# Patient Record
Sex: FEMALE | Race: WHITE | NOT HISPANIC OR LATINO | Employment: OTHER | URBAN - METROPOLITAN AREA
[De-identification: names, ages, dates, MRNs, and addresses within clinical notes are randomized per-mention and may not be internally consistent; named-entity substitution may affect disease eponyms.]

---

## 2023-03-06 ENCOUNTER — PATIENT OUTREACH (OUTPATIENT)
Dept: CARE COORDINATION | Facility: CLINIC | Age: 78
End: 2023-03-06
Payer: MEDICARE

## 2023-03-06 ENCOUNTER — DOCUMENTATION (OUTPATIENT)
Dept: PRIMARY CARE | Facility: CLINIC | Age: 78
End: 2023-03-06
Payer: MEDICARE

## 2023-03-06 DIAGNOSIS — I48.91 ATRIAL FIBRILLATION, UNSPECIFIED TYPE (MULTI): ICD-10-CM

## 2023-03-06 RX ORDER — SACUBITRIL AND VALSARTAN 24; 26 MG/1; MG/1
1 TABLET, FILM COATED ORAL 2 TIMES DAILY
COMMUNITY
End: 2023-06-07 | Stop reason: ALTCHOICE

## 2023-03-06 RX ORDER — DIGOXIN 125 MCG
125 TABLET ORAL DAILY
COMMUNITY
End: 2023-04-10 | Stop reason: ALTCHOICE

## 2023-03-06 RX ORDER — AMIODARONE HYDROCHLORIDE 200 MG/1
200 TABLET ORAL DAILY
COMMUNITY
End: 2023-04-10 | Stop reason: SDUPTHER

## 2023-03-06 RX ORDER — CARVEDILOL 6.25 MG/1
3.12 TABLET ORAL
COMMUNITY
End: 2024-01-22 | Stop reason: SDUPTHER

## 2023-03-06 RX ORDER — SPIRONOLACTONE 25 MG/1
12.5 TABLET ORAL DAILY
COMMUNITY
End: 2023-04-10 | Stop reason: SDUPTHER

## 2023-03-06 NOTE — PROGRESS NOTES
Discharge Facility: [facility]  Discharge Diagnosis: AFIB  Admission Date: 2/26/23  Discharge Date: 3/3/23    Engagement  Call Start Time: 1314 (3/6/2023  1:21 PM)    Medications  Medications reviewed with patient/caregiver?: Yes (3/6/2023  1:21 PM)  Is the patient having any side effects they believe may be caused by any medication additions or changes?: No (3/6/2023  1:21 PM)  Does the patient have all medications ordered at discharge?: Yes (3/6/2023  1:21 PM)  Nursing Interventions: Nurse provided patient education (3/6/2023  1:21 PM)  Prescription Comments: Reviewed to continue medications as prescribed on discharge paperwork until meeting with cardiology. Take 5mg eliquis until further follow up as patient was taking 2.5mg prior to hospital stay. (3/6/2023  1:21 PM)  Is the patient taking all medications as directed (includes completed medication regime)?: Yes (3/6/2023  1:21 PM)    Appointments  Does the patient have a primary care provider?: Yes (3/6/2023  1:21 PM)  Nursing Interventions: Educated patient on importance of making appointment (3/6/2023  1:21 PM)  Has the patient kept scheduled appointments due by today?: Yes (3/6/2023  1:21 PM)  Nursing Interventions: Educated on importance of keeping appointment (3/6/2023  1:21 PM)  Follow Up Tasks: PCP needed (3/6/2023  1:21 PM)    Patient Teaching  Does the patient have access to their discharge instructions?: Yes (3/6/2023  1:21 PM)  Nursing Interventions: Reviewed instructions with patient (3/6/2023  1:21 PM)  What is the patient's perception of their health status since discharge?: Improving (3/6/2023  1:21 PM)  Is the patient/caregiver able to teach back the hierarchy of who to call/visit for symptoms/problems? PCP, Specialist, Home Health nurse, Urgent Care, ED, 911: Yes (3/6/2023  1:21 PM)    Wrap Up  Call End Time: 1323 (3/6/2023  1:21 PM)

## 2023-03-09 ENCOUNTER — TELEPHONE (OUTPATIENT)
Dept: PRIMARY CARE | Facility: CLINIC | Age: 78
End: 2023-03-09
Payer: MEDICARE

## 2023-03-09 NOTE — TELEPHONE ENCOUNTER
Pt was recently released from [facility] last Friday - heart problems - heart was 147 pt has some questions in regard to new medication that they prescribed - they had shocked her heart to get her heart rate down - changed medication - now heart rate has been reading lowest 38 and highest 60.  Please call back. [phone].

## 2023-03-17 ENCOUNTER — PATIENT OUTREACH (OUTPATIENT)
Dept: CARE COORDINATION | Facility: CLINIC | Age: 78
End: 2023-03-17
Payer: MEDICARE

## 2023-03-17 ENCOUNTER — DOCUMENTATION (OUTPATIENT)
Dept: PRIMARY CARE | Facility: CLINIC | Age: 78
End: 2023-03-17
Payer: MEDICARE

## 2023-03-17 NOTE — PROGRESS NOTES
Discharge Facility:[facility]  Discharge Diagnosis:Digoxin Toxicity   Admission Date:3/13/2023  Discharge Date:3/16/2023    PCP appt:Pending        Engagement  Call Start Time: 1349 (3/17/2023  2:01 PM)    Medications  Medications reviewed with patient/caregiver?: Yes (3/17/2023  2:01 PM)  Is the patient having any side effects they believe may be caused by any medication additions or changes?: No (3/17/2023  2:01 PM)  Does the patient have all medications ordered at discharge?: Yes (3/17/2023  2:01 PM)  Care Management Interventions: Nurse provided patient education (3/6/2023  1:21 PM)  Prescription Comments: Reviewed to continue medications as prescribed on discharge paperwork until meeting with cardiology. Take 5mg eliquis until further follow up as patient was taking 2.5mg prior to hospital stay. (3/6/2023  1:21 PM)  Is the patient taking all medications as directed (includes completed medication regime)?: Yes (3/17/2023  2:01 PM)    Appointments  Does the patient have a primary care provider?: Yes (3/17/2023  2:01 PM)  Care Management Interventions: Advised patient to make appointment (3/17/2023  2:01 PM)  Has the patient kept scheduled appointments due by today?: Yes (3/17/2023  2:01 PM)  Care Management Interventions: Educated on importance of keeping appointment (3/6/2023  1:21 PM)  Follow Up Tasks: PCP needed (3/6/2023  1:21 PM)    Patient Teaching  Does the patient have access to their discharge instructions?: Yes (3/17/2023  2:01 PM)  Care Management Interventions: Reviewed instructions with patient (3/6/2023  1:21 PM)  What is the patient's perception of their health status since discharge?: Improving (3/17/2023  2:01 PM)  Is the patient/caregiver able to teach back the hierarchy of who to call/visit for symptoms/problems? PCP, Specialist, Home Health nurse, Urgent Care, ED, 911: Yes (3/17/2023  2:01 PM)    Wrap Up  Call End Time: 1353 (3/17/2023  2:01 PM)

## 2023-04-05 ENCOUNTER — TELEPHONE (OUTPATIENT)
Dept: PRIMARY CARE | Facility: CLINIC | Age: 78
End: 2023-04-05
Payer: MEDICARE

## 2023-04-05 ENCOUNTER — PATIENT OUTREACH (OUTPATIENT)
Dept: PRIMARY CARE | Facility: CLINIC | Age: 78
End: 2023-04-05
Payer: MEDICARE

## 2023-04-06 NOTE — PROGRESS NOTES
Discharge Facility:[facility]  Discharge Diagnosis:l97.89 Cardiac Tamponade after operative procedure  Admission Date:3/30/2023  Discharge Date:4/4/2023    PCP appt:4/10/2023  Engagement  Call Start Time: 1016 (4/6/2023 10:24 AM)    Medications  Medications reviewed with patient/caregiver?: Not applicable (No changes in medications) (4/6/2023 10:24 AM)  Is the patient having any side effects they believe may be caused by any medication additions or changes?: No (4/6/2023 10:24 AM)  Does the patient have all medications ordered at discharge?: Not applicable (4/6/2023 10:24 AM)  Care Management Interventions: No intervention needed (4/6/2023 10:24 AM)  Is the patient taking all medications as directed (includes completed medication regime)?: Yes (4/6/2023 10:24 AM)    Appointments  Does the patient have a primary care provider?: Yes (4/6/2023 10:24 AM)  Care Management Interventions: Verified appointment date/time/provider (4/6/2023 10:24 AM)  Has the patient kept scheduled appointments due by today?: Yes (4/6/2023 10:24 AM)    Patient Teaching  Does the patient have access to their discharge instructions?: Yes (4/6/2023 10:24 AM)  What is the patient's perception of their health status since discharge?: Improving (4/6/2023 10:24 AM)  Is the patient/caregiver able to teach back the hierarchy of who to call/visit for symptoms/problems? PCP, Specialist, Home Health nurse, Urgent Care, ED, 911: Yes (4/6/2023 10:24 AM)

## 2023-04-08 PROBLEM — R20.0 NUMBNESS IN BOTH LEGS: Status: ACTIVE | Noted: 2023-04-08

## 2023-04-08 PROBLEM — R42 DIZZINESS: Status: ACTIVE | Noted: 2023-04-08

## 2023-04-08 PROBLEM — M79.604 PAIN IN BOTH LOWER EXTREMITIES: Status: ACTIVE | Noted: 2023-04-08

## 2023-04-08 PROBLEM — M79.2 NEUROPATHIC PAIN: Status: ACTIVE | Noted: 2023-04-08

## 2023-04-08 PROBLEM — I42.0 DILATED CARDIOMYOPATHY (MULTI): Status: ACTIVE | Noted: 2023-04-08

## 2023-04-08 PROBLEM — T46.0X5S: Status: ACTIVE | Noted: 2023-04-08

## 2023-04-08 PROBLEM — I10 BENIGN ESSENTIAL HYPERTENSION: Status: ACTIVE | Noted: 2023-04-08

## 2023-04-08 PROBLEM — I95.9 HYPOTENSION: Status: ACTIVE | Noted: 2023-04-08

## 2023-04-08 PROBLEM — R06.02 EXERTIONAL SHORTNESS OF BREATH: Status: ACTIVE | Noted: 2023-04-08

## 2023-04-08 PROBLEM — R00.1 BRADYCARDIA: Status: ACTIVE | Noted: 2023-04-08

## 2023-04-08 PROBLEM — R42 LIGHTHEADEDNESS: Status: ACTIVE | Noted: 2023-04-08

## 2023-04-08 PROBLEM — I51.7 CARDIOMEGALY: Status: ACTIVE | Noted: 2023-04-08

## 2023-04-08 PROBLEM — I48.0 PAROXYSMAL ATRIAL FIBRILLATION (MULTI): Status: ACTIVE | Noted: 2023-04-08

## 2023-04-08 PROBLEM — R00.1 JUNCTIONAL BRADYCARDIA: Status: ACTIVE | Noted: 2023-04-08

## 2023-04-08 PROBLEM — E87.5 HYPERKALEMIA: Status: ACTIVE | Noted: 2023-04-08

## 2023-04-08 PROBLEM — M81.0 OSTEOPOROSIS: Status: ACTIVE | Noted: 2023-04-08

## 2023-04-08 PROBLEM — I48.91 ATRIAL FIBRILLATION WITH RVR (MULTI): Status: ACTIVE | Noted: 2023-04-08

## 2023-04-08 PROBLEM — R00.2 PALPITATIONS: Status: ACTIVE | Noted: 2023-04-08

## 2023-04-08 PROBLEM — R09.02 HYPOXIA: Status: ACTIVE | Noted: 2023-04-08

## 2023-04-08 PROBLEM — I95.1 ORTHOSTATIC HYPOTENSION: Status: ACTIVE | Noted: 2023-04-08

## 2023-04-08 PROBLEM — G62.9 NEUROPATHY: Status: ACTIVE | Noted: 2023-04-08

## 2023-04-08 PROBLEM — E78.5 DYSLIPIDEMIA: Status: ACTIVE | Noted: 2023-04-08

## 2023-04-08 PROBLEM — G95.19: Status: ACTIVE | Noted: 2023-04-08

## 2023-04-08 PROBLEM — M79.605 PAIN IN BOTH LOWER EXTREMITIES: Status: ACTIVE | Noted: 2023-04-08

## 2023-04-08 PROBLEM — I67.1 ANEURYSM OF MIDDLE CEREBRAL ARTERY (HHS-HCC): Status: ACTIVE | Noted: 2023-04-08

## 2023-04-08 PROBLEM — M85.88 OSTEOPENIA OF SPINE: Status: ACTIVE | Noted: 2023-04-08

## 2023-04-08 PROBLEM — I50.22 CHRONIC SYSTOLIC CONGESTIVE HEART FAILURE (MULTI): Status: ACTIVE | Noted: 2023-04-08

## 2023-04-08 PROBLEM — I77.810 ASCENDING AORTA DILATION (CMS-HCC): Status: ACTIVE | Noted: 2023-04-08

## 2023-04-08 PROBLEM — I65.29 CAROTID STENOSIS: Status: ACTIVE | Noted: 2023-04-08

## 2023-04-08 PROBLEM — C34.90 LUNG CANCER (MULTI): Status: ACTIVE | Noted: 2023-04-08

## 2023-04-08 PROBLEM — I73.9 CLAUDICATION, INTERMITTENT (CMS-HCC): Status: ACTIVE | Noted: 2023-04-08

## 2023-04-08 PROBLEM — G45.9 TRANSIENT ISCHEMIC ATTACK: Status: ACTIVE | Noted: 2023-04-08

## 2023-04-08 PROBLEM — J43.9 COPD (CHRONIC OBSTRUCTIVE PULMONARY DISEASE) WITH EMPHYSEMA (MULTI): Status: ACTIVE | Noted: 2023-04-08

## 2023-04-08 PROBLEM — I73.9 PAD (PERIPHERAL ARTERY DISEASE) (CMS-HCC): Status: ACTIVE | Noted: 2023-04-08

## 2023-04-08 RX ORDER — VITAMIN E MIXED 400 UNIT
400 CAPSULE ORAL DAILY
COMMUNITY

## 2023-04-10 ENCOUNTER — OFFICE VISIT (OUTPATIENT)
Dept: PRIMARY CARE | Facility: CLINIC | Age: 78
End: 2023-04-10
Payer: MEDICARE

## 2023-04-10 VITALS
TEMPERATURE: 96.8 F | RESPIRATION RATE: 16 BRPM | SYSTOLIC BLOOD PRESSURE: 128 MMHG | DIASTOLIC BLOOD PRESSURE: 70 MMHG | WEIGHT: 122 LBS | BODY MASS INDEX: 20.94 KG/M2

## 2023-04-10 DIAGNOSIS — J43.9 PULMONARY EMPHYSEMA, UNSPECIFIED EMPHYSEMA TYPE (MULTI): ICD-10-CM

## 2023-04-10 DIAGNOSIS — I10 BENIGN ESSENTIAL HYPERTENSION: ICD-10-CM

## 2023-04-10 DIAGNOSIS — I48.91 ATRIAL FIBRILLATION WITH RVR (MULTI): ICD-10-CM

## 2023-04-10 DIAGNOSIS — I73.9 CLAUDICATION, INTERMITTENT (CMS-HCC): ICD-10-CM

## 2023-04-10 DIAGNOSIS — E78.5 DYSLIPIDEMIA: ICD-10-CM

## 2023-04-10 DIAGNOSIS — I48.0 PAROXYSMAL ATRIAL FIBRILLATION (MULTI): ICD-10-CM

## 2023-04-10 DIAGNOSIS — I73.9 PAD (PERIPHERAL ARTERY DISEASE) (CMS-HCC): ICD-10-CM

## 2023-04-10 DIAGNOSIS — C34.31 MALIGNANT NEOPLASM OF LOWER LOBE OF RIGHT LUNG (MULTI): ICD-10-CM

## 2023-04-10 DIAGNOSIS — I42.0 DILATED CARDIOMYOPATHY (MULTI): Primary | ICD-10-CM

## 2023-04-10 DIAGNOSIS — I50.22 CHRONIC SYSTOLIC CONGESTIVE HEART FAILURE (MULTI): ICD-10-CM

## 2023-04-10 DIAGNOSIS — G95.19: ICD-10-CM

## 2023-04-10 PROBLEM — R00.1 BRADYCARDIA: Status: RESOLVED | Noted: 2023-04-08 | Resolved: 2023-04-10

## 2023-04-10 PROCEDURE — 3074F SYST BP LT 130 MM HG: CPT | Performed by: INTERNAL MEDICINE

## 2023-04-10 PROCEDURE — 3078F DIAST BP <80 MM HG: CPT | Performed by: INTERNAL MEDICINE

## 2023-04-10 PROCEDURE — 1160F RVW MEDS BY RX/DR IN RCRD: CPT | Performed by: INTERNAL MEDICINE

## 2023-04-10 PROCEDURE — 99496 TRANSJ CARE MGMT HIGH F2F 7D: CPT | Performed by: INTERNAL MEDICINE

## 2023-04-10 PROCEDURE — 1036F TOBACCO NON-USER: CPT | Performed by: INTERNAL MEDICINE

## 2023-04-10 PROCEDURE — 1159F MED LIST DOCD IN RCRD: CPT | Performed by: INTERNAL MEDICINE

## 2023-04-10 RX ORDER — AMIODARONE HYDROCHLORIDE 200 MG/1
200 TABLET ORAL DAILY
Qty: 30 TABLET | Refills: 0 | Status: SHIPPED | OUTPATIENT
Start: 2023-04-10 | End: 2023-04-11 | Stop reason: SDUPTHER

## 2023-04-10 RX ORDER — SPIRONOLACTONE 25 MG/1
25 TABLET ORAL DAILY
Qty: 90 TABLET | Refills: 3 | Status: SHIPPED | OUTPATIENT
Start: 2023-04-10 | End: 2024-02-07

## 2023-04-10 NOTE — PROGRESS NOTES
Subjective   Patient ID: Edda Tee is a 78 y.o. female who presents for Transition of care and hospital follow up, admitted on 3/30/2023 and discharged on 4/4/2023 for acute respiratory failure, cardiac tamponade and follow up from multiple hospital visits       She has never felt so bad.  She has edema in her feet.  She is fatigued. She could barely walk into the clinic today   She got home Tuesday from the hospital and went shopping on Wednesday  This is the first time she missed [employer] on Easter   She has an appt with Dr Mcneill next week 4/2023 and Dr Coombs on 4/25/2023  She continues to have increased fatigue   If she is sitting she can fall to sleep easily   She is not eating a lot because she is not hungry     She saw Dr De Paz in podiatry     HEALTH:  PAP not need   Mammo 1/2008 , 2016, 5/19, 8/2020, 8/2021, not needed for now   BD 1/2008 T-0.9, 4/16 T-1.9, 5/19 T-2.1, 8/2021 T-2.5, 1/2023   COLON 6/2008 + adenoma, 5/2012 and declines and declines Cologuard   EKG 11/17 , 8/18, 6/19, 9/2020, 5/2021, 7/2021, 5/2022, 8/2022 with cardio, 2/2023 ED   ECHO 9/17 normal, 5/2021   Carotids 9/17 <50%   Urine 2017 , 1/19   Hep C 8/18 -  FLU 1/12/2023  TDAP around 2013   Prevnar 5/17  Pneumo 1/2011  Zostavax never and declines   Shingrix recommend and declines   SARS CVD vaccine declines   Ophth Cataract surgery on 12/6/18 and 12/21/18, she is not wearing glasses except for night driving. No glaucoma or MD. Last visit in 2019 and will make another appt        Review of Systems  All systems negative except those listed in the HPI      Objective   Visit Vitals  /70   Temp 36 °C (96.8 °F)   Resp 16    Body mass index is 20.94 kg/m².     Physical Exam  Vitals reviewed.   Constitutional:       Appearance: Normal appearance. She is normal weight.   HENT:      Head: Normocephalic.      Right Ear: Tympanic membrane, ear canal and external ear normal.      Left Ear: Tympanic membrane, ear canal and external ear  normal.      Nose: Nose normal.      Mouth/Throat:      Pharynx: Oropharynx is clear.   Eyes:      Conjunctiva/sclera: Conjunctivae normal.   Cardiovascular:      Pulses: Normal pulses.      Heart sounds: Normal heart sounds.      Comments: A- fib,    Abdominal:      General: Bowel sounds are normal.      Palpations: Abdomen is soft.   Musculoskeletal:         General: Normal range of motion.      Cervical back: Normal range of motion and neck supple.      Comments: Pain right scapular area    Skin:     General: Skin is warm.      Comments: Ecchymosis from IVs while in hospital   she has an ulceration lateral metatarsal and proximal 5 th right toes, no infection noted    Neurological:      General: No focal deficit present.      Mental Status: She is alert and oriented to person, place, and time.   Psychiatric:         Mood and Affect: Mood normal.         Behavior: Behavior normal.         Thought Content: Thought content normal.         Judgment: Judgment normal.         Engagement  Call Start Time: 1016 (4/6/2023 10:24 AM)    Medications  Medications reviewed with patient/caregiver?: Not applicable (No changes in medications) (4/6/2023 10:24 AM)  Is the patient having any side effects they believe may be caused by any medication additions or changes?: No (4/6/2023 10:24 AM)  Does the patient have all medications ordered at discharge?: Not applicable (4/6/2023 10:24 AM)  Care Management Interventions: No intervention needed (4/6/2023 10:24 AM)  Is the patient taking all medications as directed (includes completed medication regime)?: Yes (4/6/2023 10:24 AM)    Appointments  Does the patient have a primary care provider?: Yes (4/6/2023 10:24 AM)  Care Management Interventions: Verified appointment date/time/provider (4/6/2023 10:24 AM)  Has the patient kept scheduled appointments due by today?: Yes (4/6/2023 10:24 AM)    Patient Teaching  Does the patient have access to their discharge instructions?: Yes  (4/6/2023 10:24 AM)  What is the patient's perception of their health status since discharge?: Improving (4/6/2023 10:24 AM)  Is the patient/caregiver able to teach back the hierarchy of who to call/visit for symptoms/problems? 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 She does her own shopping   Recommend she get a cane or walking stick with ambulation     She has never felt so bad.  She has edema in her feet.  She is fatigued. She could barely walk into the clinic today   She got home Tuesday from the hospital and went shopping on Wednesday  This is the first time she missed [facility]   She has an appt with Dr Mcneill next week 4/2023 and Dr Coombs on 4/25/2023  She continues to have increased fatigue   If she is sitting she can fall to sleep easily   She is not eating a lot because she is not hungry     Admitted on 3/30/2023 and discharged on 4/4/2023 for acute respiratory failure, cardiac tamponade. S/p percutaneous pericardiocentesis on 3/30/2023 with Dr Perez   This is a 79 yo female  with pmhx of HFrEF, atrial fibrillation on Eliquis, COPD   on 2 L nasal cannula as needed.    She presents on 3/30 for elective outpatient ICD placement which was complicated by cardiac tamponade and acute hypoxic respiratory failure. Pt initially managed in ICU with mechanical ventilation and pericardial drain placement.   Pericardial drain has since been removed, repeat echo shows resolution of pericardial effusion. EP was consulted, they recommended to resume her home medications including Eliquis. She has made an uneventful recovery, surgical site looks well, and has remained hemodynamically stable    Admitted on 3/13/2023 and discharged on 3/16/2023 for symtomatic bradycardia    Presented to the emergency department for   evaluation of symptomatic bradycardia.  Patient was discharged on March 3 after   receiving treatment for atrial fibrillation with RVR requiring cardioversion,   discharged with prescriptions for carvedilol, amiodarone, digoxin.    EKG 4/2023 showed bradycardia with curved ST depressions consistent with digoxin effect without evidence of ischemia, elevated serum digoxin level, elevated but downtrending troponins, mild prerenal SMOOTH.   On admission, all antihypertensives and antiarrhythmics were initially discontinued. Digoxin levels were trended until <1.5. She will permanently discontinue this medication. She was discharged to continue spironolactone 12.5 mg daily, carvedilol 3.125 BID, amiodarone 300 mg daily, Entresto 24 1/2 tablet daily.  Her heart rate remained stable after discontinuation of digoxin. She was additionally   discharged on 2.5 mg twice daily of eliquis given her low body weight and SMOOTH.  She has an appt with Dr Mcneill in 4/2023 and Dr Coombs 4/25/2023     Admitted on 2/26/2023 and discharged on 3/3/2023 for afib rvr, hfref : she is in A- fib,  4/2023.   Seen in the ED on 2/26/2023 for SOB.  Patient is a 78yoF with a pmhx of with Afib on AC, newly diagnosed invasive  SCC of lung (diagnosed 12/2022, PDL1 40%) s/p SBRT (complete 2/3/23)  EKG 2/2023 showed a-fib with RVR 2/2023  Troponin negative.  Labs w/o leukocytosis. Notable for macrocytosis, INR 1.5, hypoNa, K 7.1 (hemolyzed), NAGMA with bicarb 20, BUN/sCr 28/1.12 (at baseline), mild transaminitis w/ AST 51. Trops neg. COVID neg.   CXR 2/2023 w/ increased patchy interstitial opacities overlying known RML mass c/f viral or tx related pneumonitis. Patient started on diltiazem gtt with rates improved to 100s however BPs became soft 90s/60s.   Patient was evaluated by cardiology and electrophysiology.   Pt underwent cardiac cath with Dr. Robles L&R heart cath revealed 70% mid RCA stenosis and a 90% ostial LAD-diagonal branch, both for medical management.  LV was dilated with an ejection fraction of around 15 to 20%.  PCWP was 8 mmHg. DOMITILA was performed without thrombus patient underwent successful DCCV 3/2/23. patient found to have reduced LVEF 20-25% was initated on GDMT as tolerated per cardiology.   Patient found to have mod to severe Mitral regurg will need to be reassessed   once HF treatment is optimized for potential repair    Seen in the ED on 12/4/2022 for mechanical fall   She reports she fell off a stool because she missed the last step and went forward over the stool sliding across the floor. She denies hitting her head.   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   Dr Pina did EBUS on 12/19/2022 showed Interval increase in size of right middle lobe mass when compared with the previous CT from 08/23/2021, concerning for neoplastic etiology. Interval increase in size of multiple mediastinal lymph nodes as detailed above, concerning for metastatic lymphadenopathy. Moderate upper lobe predominant emphysematous  changes.   Pathology results 12/2022 showed malignant cells derived from SCC   MRI of brain in 1/2023 negative for acute process   PET/ CT 1/2023 showed intense hypermetabolic activity in a 3.4 cm right middle lobe lung mass c/w malignancy. No evidence of hypermetabolic disease elsewhere  The patient is not interested in surgery and is a candidate for SBRT. She has a loop recorder and we will avoid leads. She will discuss the role of adjuvant immunotherapy with Dr Gilbert and may be a candidate for [place] [id number] trial. SBRT 50 Gy 4 fx to the RML mass planned. Simulation today. Treatment 50 Gy 4 fx week done on 1/30/23  She saw Heme onc in 1/2023. She saw Rad onc in 1/2023    COVID-19 positive 12/28/2021: Resolved     Her hearing is not as good as it used to be   She declines appt with audiology for now 1/2023     She quit smoking with the use of Zyban   Encouraged continued cessation     Her weight/ BMI is in normal range in office, recommend she maintain  She needs increased protein     HTN: Stable   She stopped all her medications and does not want refills   ECHO 5/2021 was normal but poor functional capacity   Stress test normal 5/2021  EKG 11/2022 showed a fib with RVR  She saw Dr Mcneill in 7/2021.   She saw Tracey Schwab in 8/2022 and discontinued lisinopril  Recommend she monitor her BP at home and call with elevated readings.     Long standing atrial Fib with prior TIA in 9/2017:   She continues to have episodes of a-fib then syncope.   She has been on Pradaxa then Eliquis since 9/17 after she was admitted with TIA 9/17   Carotid duplex <50% stenosis bilateral vertebra arteries patent with antegrade flow  Continue spironolactone 12.5 mg daily, carvedilol 3.125 BID, amiodarone 300 mg daily, Entresto 24 1/2 tablet daily, Eliquis 2.5 mg BID. Refilled amiodarone today 4/2023  CXR in 12/2020 showed cardiac megaly with mild interstitial edema   EKG 11/2022 showed a fib with RVR  CTA chest 11/2022 showed increasing size  RML lung mass, (been under surveillance since Dec 2020) and worsening emphysematous changes.   EP did an implantable loop on 11/18/2022   She saw Dr Mcneill in 1/2023:  She has an appt with Dr Mcneill in 4/2023 and Dr Coombs 4/25/2023  Dr Coombs spoke with Dr Gilbert about patients treatment options     Middle cerebral aneurysm right side :  She needs CT angio and has to schedule since 2 mm only     The patient's 10 yr CV risk was estimated at 16 %. We can decrease this to 13% if we get tighter control of her lipid levels 1/2023. 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  Recommend she have her sons tested as well.     LE edema: +1 pitting edema at ankle and foot 2/2023  She stopped furosemide and does not want a refill   Recommend she elevate her feet during the day and for 45 minutes BID     Insomnia:  She stopped trazodone.  She is sleeping better     Constipation:   Discussed systemic medications that are contributing to her constipation   She is to avoid straining   Continue Mg 200 mg daily.  She can add a stool softener when needed     Neurogenic claudication BLE: On exam: she has an ulceration lateral metatarsal and proximal 5 th right toes, no infection noted 4/2023  Aortogram 7/2021 with lower extremity angiography showed critical proximal right iliac, renal artery stenosis of about 90% or more with heavy calcifications, completely occluded left SFA throughout its length, and a focal mid right SFA stenosis of about 90% had iliac artery stenting via the right femoral approach without complication   She saw Dr Mota in 11/2021 and feels she has PAD versus peripheral neuropathy   MRI of L/S 11/2021 did not show stenosis   She saw   Svetlana in 2021 and EMG studies 2021 were normal   She restarted PT but on the 3 rd session she pulled a hamstring and has not been back   She continues to do her exercises at home   Continue cilostazol 50 mg daily   She saw Dr Winn in 2022 and recommend continued medical management   She saw Dr Mcneill in 2023 and per notes: PAD, with no significant claudications though she is very limited in her activities and her feet discoloration and redness has increased over the past several weeks more so on the left side where she had few superficial lesions.   She has not seen a podiatrist before. Pulses are diminished in both feet seems to be cooler than the legs above the ankles. Her vascular situation need to be reevaluated and I would consider duplex ultrasound of both lower extremities.   She saw Dr. De Paz for podiatry care. Recommend she keep feet warm     Spinal Stenosis: her back pain is chronic   She should wait on any spine surgery until pain is consistent and intolerable.   Xray L/S 2020 showed severe multilevel spondylosis worse at L4- 5 and mild anterolisthesis of L3 on L4 and retrolisthesis of L1 on L2   She will continue to follow with Dr Almanzar and Dr Porras   She has done PT and chiropractor in the past and declines to do more     She has intermittent cold sores:  She is no longer taking acyclovir     Mammo in 2021 was normal. She declines mammo now due to extensive work up and imaging for SCC lung cancer. She had PET/ CT in 2023.   Breast exam normal 2023  BD in 2021 was T-2.5 and ordered 2023. Continue Calcium daily and eat 2 servings of calcium enriched foods daily. Discussed importance of weight bearing exercises.     Colonoscopy with Dr Jain in  was normal.  She knows a couple of people who  during colonoscopy and she is afraid to repeat it.  She declines Cologuard because her sister had false positive results.  She declines colonoscopy and Cologuard  7/2021    Ophth:  Cataract surgery on 12/6/18 and 12/21/18, she is not wearing glasses except for night driving. No glaucoma or MD. Last visit in 2019 and will make another appt   She will have her last eye exam faxed to my office in order to update her medical records.    She has a LW and MPOA.  Recommend she bring a copy of her Advanced Directives in office to have scanned in her chart 1/2023    Hep C 8/18 -  FLU 1/12/2023  TDAP around 2013   Prevnar 5/17  Pneumo 1/2011  Zostavax never and declines   Shingrix recommend and declines-> Recommend she talk to Dr Gilbert to make sure that it is ok to get the Shingrix by 2 now 1/2023  SARS CVD vaccine declines     RTC in 5/2023 for follow up or sooner if needed      Scribe Attestation  By signing my name below, IJessica , Scribe   attest that this documentation has been prepared under the direction and in the presence of Sunshine Warren MD.

## 2023-04-10 NOTE — PROGRESS NOTES
"Patient: Edda Tee  : 1945  PCP: Sunshine Warren MD  MRN: [id number]  Program: No linked episodes     Edda Tee is a 78 y.o. female presenting today for follow-up after being discharged from the hospital 6 days ago. The main problem requiring admission was pericardial effusion post defibrillat. The discharge summary and/or [facility] [place] and pacemaker placed . Management   documentation was reviewed. Medication reconciliation was performed as indicated via the \"Leonel as Reviewed\" timestamp.     Edda Tee was contacted by Transitional Care Management services two days after her discharge. This encounter and supporting documentation was reviewed.    The complexity of medical decision making for this patient's transitional care is high.    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)    Lung cancer (CMS/HCC)       Circulatory    Atrial fibrillation with RVR (CMS/HCC)    Benign essential hypertension    Chronic systolic congestive heart failure (CMS/HCC)    Dilated cardiomyopathy (CMS/HCC) - Primary    Relevant Medications    spironolactone (Aldactone) 25 mg tablet    PAD (peripheral artery disease) (CMS/HCC)    RESOLVED: Paroxysmal atrial fibrillation (CMS/HCC)    Relevant Medications    amiodarone (Pacerone) 200 mg tablet       Musculoskeletal    Claudication, intermittent (CMS/HCC)       Other    Dyslipidemia       Review of Systems    Family History   Problem Relation Name Age of Onset    Hypertension Mother      Diabetes Mother      Stroke Father         Engagement  Call Start Time: 1016 (2023 10:24 AM)    Medications  Medications reviewed with patient/caregiver?: Not applicable (No changes in medications) (2023 10:24 AM)  Is the patient having any side effects they believe may be caused by any medication " additions or changes?: No (4/6/2023 10:24 AM)  Does the patient have all medications ordered at discharge?: Not applicable (4/6/2023 10:24 AM)  Care Management Interventions: No intervention needed (4/6/2023 10:24 AM)  Is the patient taking all medications as directed (includes completed medication regime)?: Yes (4/6/2023 10:24 AM)    Appointments  Does the patient have a primary care provider?: Yes (4/6/2023 10:24 AM)  Care Management Interventions: Verified appointment date/time/provider (4/6/2023 10:24 AM)  Has the patient kept scheduled appointments due by today?: Yes (4/6/2023 10:24 AM)    Patient Teaching  Does the patient have access to their discharge instructions?: Yes (4/6/2023 10:24 AM)  What is the patient's perception of their health status since discharge?: Improving (4/6/2023 10:24 AM)  Is the patient/caregiver able to teach back the hierarchy of who to call/visit for symptoms/problems? PCP, Specialist, Home Health nurse, Urgent Care, ED, 911: Yes (4/6/2023 10:24 AM)        No follow-ups on file.

## 2023-04-11 DIAGNOSIS — I48.0 PAROXYSMAL ATRIAL FIBRILLATION (MULTI): ICD-10-CM

## 2023-04-11 RX ORDER — AMIODARONE HYDROCHLORIDE 200 MG/1
TABLET ORAL
Qty: 30 TABLET | Refills: 0 | Status: SHIPPED | OUTPATIENT
Start: 2023-04-11 | End: 2023-05-21

## 2023-04-12 ENCOUNTER — TELEPHONE (OUTPATIENT)
Dept: PRIMARY CARE | Facility: CLINIC | Age: 78
End: 2023-04-12
Payer: MEDICARE

## 2023-04-12 DIAGNOSIS — L29.9 ITCHING: Primary | ICD-10-CM

## 2023-04-12 RX ORDER — HYDROXYZINE HYDROCHLORIDE 25 MG/1
25 TABLET, FILM COATED ORAL 3 TIMES DAILY
Qty: 90 TABLET | Refills: 1 | Status: SHIPPED | OUTPATIENT
Start: 2023-04-12 | End: 2023-07-29

## 2023-04-12 NOTE — TELEPHONE ENCOUNTER
VM The patient was seen on 4/10/23 and she was suppose to be prescribed or told to get OTC for itching.  Can you please advise? If you need to call something in, please send [facility].  Thank you.

## 2023-04-12 NOTE — TELEPHONE ENCOUNTER
Call pt  that I called in atarax up to 3 x day for her to see if helps itchiness but will make her tired

## 2023-04-13 ENCOUNTER — PATIENT OUTREACH (OUTPATIENT)
Dept: PRIMARY CARE | Facility: CLINIC | Age: 78
End: 2023-04-13
Payer: MEDICARE

## 2023-04-14 ENCOUNTER — TELEPHONE (OUTPATIENT)
Dept: PRIMARY CARE | Facility: CLINIC | Age: 78
End: 2023-04-14
Payer: MEDICARE

## 2023-04-14 LAB
ANION GAP IN SER/PLAS: 10 MMOL/L (ref 10–20)
CALCIUM (MG/DL) IN SER/PLAS: 8.9 MG/DL (ref 8.6–10.3)
CARBON DIOXIDE, TOTAL (MMOL/L) IN SER/PLAS: 27 MMOL/L (ref 21–32)
CHLORIDE (MMOL/L) IN SER/PLAS: 103 MMOL/L (ref 98–107)
CREATININE (MG/DL) IN SER/PLAS: 1.13 MG/DL (ref 0.5–1.05)
GFR FEMALE: 50 ML/MIN/1.73M2
GLUCOSE (MG/DL) IN SER/PLAS: 89 MG/DL (ref 74–99)
POTASSIUM (MMOL/L) IN SER/PLAS: 4.6 MMOL/L (ref 3.5–5.3)
SODIUM (MMOL/L) IN SER/PLAS: 135 MMOL/L (ref 136–145)
UREA NITROGEN (MG/DL) IN SER/PLAS: 31 MG/DL (ref 6–23)

## 2023-04-14 NOTE — TELEPHONE ENCOUNTER
Patient called regarding status on new medication for vaginal itching. Patient uses [facility] - pharmacy has been trying to reach out as well.

## 2023-04-21 ENCOUNTER — PATIENT OUTREACH (OUTPATIENT)
Dept: PRIMARY CARE | Facility: CLINIC | Age: 78
End: 2023-04-21
Payer: MEDICARE

## 2023-04-21 RX ORDER — FUROSEMIDE 40 MG/1
40 TABLET ORAL DAILY
COMMUNITY
End: 2024-03-11 | Stop reason: ALTCHOICE

## 2023-04-21 NOTE — PROGRESS NOTES
Discharge Facility:[facility]  Discharge Diagnosis:l50.23 Acute on Chronic L systolic heart failure, l48.91 AFIB  Admission Date:4/17/2023  Discharge Date: 4/20/2023    PCP Appointment Date:5/8/2023(outside of 14 days, but had previous hospital follow up within 30 days.)  Specialist Appointment Date: 5/5/2023 Cardiology  Hospital Encounter and Summary: not available at this time  See discharge assessment below for further details  Engagement  Call Start Time: 1439 (4/21/2023  2:40 PM)    Medications  Medications reviewed with patient/caregiver?: Yes (4/21/2023  2:40 PM)  Is the patient having any side effects they believe may be caused by any medication additions or changes?: No (4/21/2023  2:40 PM)  Does the patient have all medications ordered at discharge?: Yes (4/21/2023  2:40 PM)  Care Management Interventions: No intervention needed (4/21/2023  2:40 PM)  Is the patient taking all medications as directed (includes completed medication regime)?: Yes (4/21/2023  2:40 PM)  Care Management Interventions: Provided patient education (4/21/2023  2:40 PM)    Appointments  Does the patient have a primary care provider?: Yes (4/21/2023  2:40 PM)  Care Management Interventions: Verified appointment date/time/provider (4/21/2023  2:40 PM)  Has the patient kept scheduled appointments due by today?: Yes (4/21/2023  2:40 PM)    Patient Teaching  Does the patient have access to their discharge instructions?: Yes (4/21/2023  2:40 PM)  What is the patient's perception of their health status since discharge?: Improving (4/21/2023  2:40 PM)  Is the patient/caregiver able to teach back the hierarchy of who to call/visit for symptoms/problems? PCP, Specialist, Home Health nurse, Urgent Care, ED, 911: Yes (4/21/2023  2:40 PM)

## 2023-05-08 ENCOUNTER — APPOINTMENT (OUTPATIENT)
Dept: PRIMARY CARE | Facility: CLINIC | Age: 78
End: 2023-05-08
Payer: MEDICARE

## 2023-05-09 NOTE — PROGRESS NOTES
Subjective   Patient ID: Edda Tee is a 78 y.o. female who presents for hospital follow up, admitted on 4/17/2023 and discharged on 4/20/2023 for acute systolic heart failure and afib. S/p DC shock and dual chamber ICD interrogation and reporgramming on 4/20/2023 with Dr Perez as well as her other hospital follow ups in 3/2023 and 2/2023    She is still in a-fib and not feeling very well  She has tenderness around the implant site   She has an appt with Dr Coombs in 5/2023 and may need to be shocked again   She felt good for 5 days after discharge     Her lung tumor has decreased in size and no mets. She talked with Heme onc yesterday 5/9/2023  She is not smoking.     She likes to be in the garden during warmer weather    HEALTH:  PAP not need   Mammo 1/2008 , 2016, 5/19, 8/2020, 8/2021, not needed for now   BD 1/2008 T-0.9, 4/16 T-1.9, 5/19 T-2.1, 8/2021 T-2.5, 1/2023   COLON 6/2008 + adenoma, 5/2012 and declines repeat. Declines Cologuard   EKG 11/17 , 8/18, 6/19, 9/2020, 5/2021, 7/2021, 5/2022, 8/2022, 4/2023 ED   ECHO 9/17 normal, 5/2021, 3/2023   Carotids 9/17 <50%   Urine 2017 , 1/19   Hep C 8/18 -  FLU 1/12/2023  TDAP around 2013   Prevnar 5/17  Pneumo 1/2011  Zostavax never and declines   Shingrix recommend and declines   SARS CVD vaccine declines   Ophth Cataract surgery on 12/6/18 and 12/21/18. She is not wearing glasses except for night driving. No glaucoma or MD.         Review of Systems  All systems negative except those listed in the HPI      Objective   Visit Vitals  /60 (BP Location: Left arm, Patient Position: Sitting, BP Cuff Size: Large adult)   Pulse 60   Temp 36 °C (96.8 °F)    Body mass index is 21.54 kg/m².       Physical Exam  Vitals reviewed.   Constitutional:       Appearance: Normal appearance. She is normal weight.   HENT:      Head: Normocephalic.      Right Ear: Tympanic membrane, ear canal and external ear normal.      Left Ear: Tympanic membrane, ear canal and external  ear normal.      Nose: Nose normal.      Mouth/Throat:      Pharynx: Oropharynx is clear.   Eyes:      Conjunctiva/sclera: Conjunctivae normal.   Cardiovascular:      Rate and Rhythm: Normal rate and regular rhythm.      Pulses: Normal pulses.      Heart sounds: Normal heart sounds.   Pulmonary:      Effort: Pulmonary effort is normal.      Breath sounds: Normal breath sounds.   Abdominal:      General: Bowel sounds are normal.      Palpations: Abdomen is soft.   Musculoskeletal:         General: Normal range of motion.      Cervical back: Normal range of motion and neck supple.   Skin:     General: Skin is warm.   Neurological:      General: No focal deficit present.      Mental Status: She is alert and oriented to person, place, and time.   Psychiatric:         Mood and Affect: Mood normal.         Behavior: Behavior normal.         Thought Content: Thought content normal.         Judgment: Judgment normal.       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 S/p DC shock and dual chamber ICD interrogation and reporgramming on 4/20/2023 with Dr Perez  Seen in the ED on 4/17/2023 for progressive dyspnea on exertion  Point-of-care ultrasound 4/2023 at bedside shows no evidence of pericardial effusion, however she does have B-lines in all lung fields consistent with pulmonary edema.    Work-up shows significantly elevated BNP level of 2500, this is significantly   elevated from previous measurements.    CXR 4/2023 showed pulmonary edema   I am concerned  given her severe limitation in functional status that she is to be admitted for diuresis.  Troponin was normal,   EKG 4/2023: Demand pacemaker with underlying A-fib, rate of 96 bpm, QTc 477 ms. No ST elevations or depressions, no acute ischemic injury pattern.  T wave inversions   in lateral leads are new compared to prior EKGs.  Patient was seen by cardiology who recommended increasing spironolactone to 25   mg daily as well as starting daily p.o. Lasix 40 mg.    Over the course of her admission the patient remained slightly tachycardic in atrial fibrillation, carvedilol was increased to 6.25 mg twice daily.    Patient was cardioverted on 4/20 and converted to normal sinus rhythm.  At this time patient is cleared for discharge to follow-up with cardiology and EP as an outpatient.   She has an appt with Dr Coombs in 5/2023 and may need to be shocked again     Admitted on 3/30/2023 and discharged on 4/4/2023 for acute respiratory failure and cardiac tamponade. S/p percutaneous pericardiocentesis on 3/30/2023. Approximately 400 to 500 cc of blood removed from the pericardial sac.  She presents on 3/30/2023 for elective outpatient ICD placement which was complicated by cardiac tamponade and acute hypoxic respiratory failure.   Pt initially managed in ICU with mechanical ventilation and pericardial drain placement. Pericardial drain has since been removed, repeat echo shows resolution of pericardial effusion.   EP was consulted, they recommended to resume her home medications including Eliquis.   She has made an uneventful recovery, surgical site looks well, and has remained hemodynamically stable, she is currently at her baseline O2 requirements of 2LNC. Plan to discharge with follow ups to her PCP and EP.   She saw Dr Robles in 4/2023     Admitted on 3/13/2023 and discharged on 3/16/2023 for symptomatic bradycardia, digoxin toxicity   Seen in the ED on 3/13/2023 at the direction of cardiology for low heart rate   HR  in the 30s while in cardiology office   EKG 3/2023 showing junctional rhythm with no acute ischemic injury pattern appreciated.  CBC with no leukocytosis anemia or thrombocytopenia.    Chemistry shows elevated BUN and creatinine which is above patient's baseline.  Troponin slightly elevated at 41 with repeat of 34.  Digoxin level was elevated at 2.9.    Chest x-ray 3/2023 showed no acute cardiopulmonary processes.  I spoke with the patient's cardiologist Dr. Robles who requested the patient be admitted and have digoxin discontinued as well as carvedilol until her heart rate improves.    Believe this is all to be related to overmedication.    Admitted on 2/26/2023 and discharged on 3/3/2023 for afib rvr, hfref   Seen in the ED on 2/26/2023 for SOB  She was tachycardic and irregular.  EKG 2/2023 shows A-fib with RVR.  EKG repeat 2/2023 without acute injury pattern, troponin negative.  Potassium   hemolyzed but repeat shows normal potassium level.  Renal function tests   similar to baseline.  Patient's rate improved following Cardizem bolus and   infusion, now in the low 100s.  She saw Dr Robles in 4/2023     Seen in the ED on 12/4/2022 for mechanical fall   She reports she fell off a stool because she missed the last step and went forward over the stool sliding across the floor. She denies hitting her head.   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   Dr Pina did EBUS on 12/19/2022 showed Interval increase in size of right middle lobe mass when compared   with the previous CT from 08/23/2021, concerning for neoplastic etiology. Interval increase in size of multiple mediastinal lymph nodes as detailed above, concerning for metastatic lymphadenopathy. Moderate upper lobe predominant emphysematous  changes.   Pathology results 12/2022 showed malignant cells derived from SCC   MRI of brain in 1/2023 negative for acute process   PET/ CT 1/2023 showed intense hypermetabolic activity in a 3.4 cm right middle lobe lung mass c/w malignancy. No evidence of hypermetabolic disease elsewhere  She saw Heme onc in 1/2023 and patient stated she is not interested in surgery but will do RTX  She started RTX on 1/31/2023 and did 5 treatments, last 2/3/2023   She saw Rad Onc in 5/2023 Per notes: Interval decrease in size of a right middle lobe mass, with mild residual activity compatible with post treatment changes. No residual   focal hypermetabolic activity to suggest residual/recurrent disease. No distant hypermetabolic disease.      COVID-19 positive 12/28/2021: Resolved     Her hearing is not as good as it used to be   She declines appt with audiology for now 1/2023     She quit smoking in 11/2022 with the use of Zyban   Encouraged continued cessation     Her weight/ BMI is in normal range in office, recommend she maintain    HTN: Stable   Continue atenolol 25 mg daily and hydroxyzine 25 mg TID   ECHO 5/2021 was normal but poor functional capacity   Stress test normal 5/2021  EKG 11/2022 showed a fib with RVR  She saw Tracey Schwab in 8/2022 and discontinued lisinopril  She saw Dr Robles in 4/2023   Recommend she monitor her BP at home and call with elevated readings.     Long standing atrial Fib with prior TIA in 9/2017: On exam: regular 5/2023. S/p DC shock and dual chamber ICD interrogation and reporgramming on 4/20/2023 with Dr Perez   Continue carvedilol 3.12 mg BID and amiodarone 200 mg daily  Continue spironolactone 25 mg daily and atenolol 25 mg daily  Continue Eliquis 2.5 mg BID  She has been on Pradaxa then Eliquis since 9/17 after she was admitted with TIA 9/17   Carotid duplex: <50% stenosis bilateral vertebra arteries patent with antegrade flow  Continue Eliquis 2.5 mg BID and cilostazol 50 mg BID  EP did  an implantable loop on 11/18/2022   She saw Dr Mcneill in 4/2023   She has an appt with Dr Coombs in 5/2023 and may need to be shocked again     Middle cerebral aneurysm right side :  She needs CT angio and has to schedule since 2 mm only     I have spent 15 min face to face with this patient discussing their cardiac risk and behavioral therapies of nutrition choices and exercise. We are trying to eliminate habits that are contributing to their cardiac risk.  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  Recommend she have her sons tested as well.     LE edema:  She stopped furosemide and does not want a refill     Insomnia:  She stopped trazodone.  She is sleeping better     Constipation: discussed systemic medications that are contributing to her constipation   She is to avoid straining   Continue Mg 200 mg daily.  She can add a stool softener when needed     Neurogenic claudication BLE:   She saw podiatry because she thought the pain in her legs was due to her feet  She saw Dr Roth and he did an US of her legs and it was not good.  Aortogram 7/2021 with lower extremity angiography showed critical proximal right iliac, renal artery stenosis of about 90% or more with heavy calcifications, completely occluded left SFA throughout its length, and a focal mid right SFA stenosis of about 90% had iliac artery stenting via the right femoral approach without complication   She saw Dr Mota in 11/2021 and feels she has PAD versus peripheral neuropathy   MRI of L/S 11/2021 did not  show stenosis   She saw Dr Mota in 2021 and EMG studies 2021 were normal   She restarted PT but on the 3 rd session she pulled a hamstring and has not been back   She continues to do her exercises at home   Continue cilostazol 50 mg daily   She saw Dr Winn in 2022 and recommend continued medical management     Spinal Stenosis: her back pain is chronic   She should wait on any spine surgery until pain is consistent and intolerable.   Xray L/S 2020 showed severe multilevel spondylosis worse at L4- 5 and mild anterolisthesis of L3 on L4 and retrolisthesis of L1 on L2   She will continue to follow with Dr Almanzar and Dr Porras   She has done PT and chiropractor in the past and declines to do more   Recommend she look into a girdle to help with posture and back pain     She has intermittent cold sores:  She is no longer taking acyclovir     Mammo in 2021 was normal. She declines mammo now due to extensive work up and imaging for SCC lung cancer. She had PET/ CT in 2023.   Breast exam normal 2023  BD in 2021 was T-2.5 and ordered 2023. Continue Calcium daily and eat 2 servings of calcium enriched foods daily. Discussed importance of weight bearing exercises.     Colonoscopy with Dr Jain in  was normal.  She knows a couple of people who  during colonoscopy and she is afraid to repeat it. She declines Cologuard because her sister had false positive results.  She declines colonoscopy and Cologuard 2021    Ophth:  Cataract surgery on 18 and 18. She is not wearing glasses except for night driving. No glaucoma or MD.    She will have her last eye exam faxed to my office in order to update her medical records.    She has a LW and MPOA.  Recommend she bring a copy of her Advanced Directives in office to have scanned in her chart 2023    Hep C  -  FLU 2023  TDAP around    Prevnar   Pneumo 2011  Zostavax never and declines   Shingrix recommend and  declines  SARS CVD vaccine declines   Disability placard given 5/2023 for 5 years     RTC in 3-4 months for follow up or sooner if needed   (MCR due 1/2024)    Scribe Attestation  By signing my name below, I, Jessica Blackman , Violeta   attest that this documentation has been prepared under the direction and in the presence of Sunshine Warren MD.

## 2023-05-10 ENCOUNTER — OFFICE VISIT (OUTPATIENT)
Dept: PRIMARY CARE | Facility: CLINIC | Age: 78
End: 2023-05-10
Payer: MEDICARE

## 2023-05-10 VITALS
TEMPERATURE: 96.8 F | DIASTOLIC BLOOD PRESSURE: 60 MMHG | BODY MASS INDEX: 21.52 KG/M2 | HEART RATE: 60 BPM | HEIGHT: 61 IN | WEIGHT: 114 LBS | SYSTOLIC BLOOD PRESSURE: 110 MMHG

## 2023-05-10 DIAGNOSIS — I50.22 CHRONIC SYSTOLIC CONGESTIVE HEART FAILURE (MULTI): ICD-10-CM

## 2023-05-10 DIAGNOSIS — I48.91 ATRIAL FIBRILLATION WITH RVR (MULTI): ICD-10-CM

## 2023-05-10 DIAGNOSIS — I95.1 ORTHOSTATIC HYPOTENSION: ICD-10-CM

## 2023-05-10 DIAGNOSIS — J43.9 PULMONARY EMPHYSEMA, UNSPECIFIED EMPHYSEMA TYPE (MULTI): ICD-10-CM

## 2023-05-10 DIAGNOSIS — I73.9 PAD (PERIPHERAL ARTERY DISEASE) (CMS-HCC): ICD-10-CM

## 2023-05-10 DIAGNOSIS — I10 BENIGN ESSENTIAL HYPERTENSION: ICD-10-CM

## 2023-05-10 DIAGNOSIS — E78.5 DYSLIPIDEMIA: ICD-10-CM

## 2023-05-10 DIAGNOSIS — C34.31 MALIGNANT NEOPLASM OF LOWER LOBE OF RIGHT LUNG (MULTI): Primary | ICD-10-CM

## 2023-05-10 DIAGNOSIS — R00.1 JUNCTIONAL BRADYCARDIA: ICD-10-CM

## 2023-05-10 DIAGNOSIS — I73.9 CLAUDICATION, INTERMITTENT (CMS-HCC): ICD-10-CM

## 2023-05-10 PROBLEM — R00.2 PALPITATIONS: Status: RESOLVED | Noted: 2023-04-08 | Resolved: 2023-05-10

## 2023-05-10 PROBLEM — R42 DIZZINESS: Status: RESOLVED | Noted: 2023-04-08 | Resolved: 2023-05-10

## 2023-05-10 PROBLEM — E87.5 HYPERKALEMIA: Status: RESOLVED | Noted: 2023-04-08 | Resolved: 2023-05-10

## 2023-05-10 PROBLEM — I95.9 HYPOTENSION: Status: RESOLVED | Noted: 2023-04-08 | Resolved: 2023-05-10

## 2023-05-10 PROBLEM — R42 LIGHTHEADEDNESS: Status: RESOLVED | Noted: 2023-04-08 | Resolved: 2023-05-10

## 2023-05-10 PROCEDURE — 3074F SYST BP LT 130 MM HG: CPT | Performed by: INTERNAL MEDICINE

## 2023-05-10 PROCEDURE — 1160F RVW MEDS BY RX/DR IN RCRD: CPT | Performed by: INTERNAL MEDICINE

## 2023-05-10 PROCEDURE — 99215 OFFICE O/P EST HI 40 MIN: CPT | Performed by: INTERNAL MEDICINE

## 2023-05-10 PROCEDURE — 1159F MED LIST DOCD IN RCRD: CPT | Performed by: INTERNAL MEDICINE

## 2023-05-10 PROCEDURE — 3078F DIAST BP <80 MM HG: CPT | Performed by: INTERNAL MEDICINE

## 2023-05-10 PROCEDURE — 1036F TOBACCO NON-USER: CPT | Performed by: INTERNAL MEDICINE

## 2023-05-11 ENCOUNTER — PATIENT OUTREACH (OUTPATIENT)
Dept: CARE COORDINATION | Facility: CLINIC | Age: 78
End: 2023-05-11
Payer: MEDICARE

## 2023-05-11 NOTE — PROGRESS NOTES
Unable to reach patient for call back after patient's follow up appointment with PCP.   ANGELESM with call back number for patient to call if needed   If no voicemail available call attempts x 2 were made to contact the patient to assist with any questions or concerns patient may have.

## 2023-05-20 DIAGNOSIS — I48.0 PAROXYSMAL ATRIAL FIBRILLATION (MULTI): ICD-10-CM

## 2023-05-21 RX ORDER — AMIODARONE HYDROCHLORIDE 200 MG/1
TABLET ORAL
Qty: 30 TABLET | Refills: 0 | Status: SHIPPED | OUTPATIENT
Start: 2023-05-21 | End: 2023-06-22

## 2023-06-06 ENCOUNTER — TELEPHONE (OUTPATIENT)
Dept: PRIMARY CARE | Facility: CLINIC | Age: 78
End: 2023-06-06
Payer: MEDICARE

## 2023-06-07 ENCOUNTER — OFFICE VISIT (OUTPATIENT)
Dept: PRIMARY CARE | Facility: CLINIC | Age: 78
End: 2023-06-07
Payer: MEDICARE

## 2023-06-07 VITALS
DIASTOLIC BLOOD PRESSURE: 60 MMHG | TEMPERATURE: 96.8 F | SYSTOLIC BLOOD PRESSURE: 120 MMHG | OXYGEN SATURATION: 98 % | WEIGHT: 114 LBS | HEART RATE: 66 BPM | BODY MASS INDEX: 21.54 KG/M2

## 2023-06-07 DIAGNOSIS — S81.801A WOUND OF RIGHT LOWER EXTREMITY, INITIAL ENCOUNTER: Primary | ICD-10-CM

## 2023-06-07 DIAGNOSIS — I48.91 ATRIAL FIBRILLATION WITH RVR (MULTI): ICD-10-CM

## 2023-06-07 DIAGNOSIS — I73.9 PAD (PERIPHERAL ARTERY DISEASE) (CMS-HCC): ICD-10-CM

## 2023-06-07 DIAGNOSIS — J43.9 PULMONARY EMPHYSEMA, UNSPECIFIED EMPHYSEMA TYPE (MULTI): ICD-10-CM

## 2023-06-07 DIAGNOSIS — I10 BENIGN ESSENTIAL HYPERTENSION: ICD-10-CM

## 2023-06-07 PROCEDURE — 3074F SYST BP LT 130 MM HG: CPT | Performed by: INTERNAL MEDICINE

## 2023-06-07 PROCEDURE — 90471 IMMUNIZATION ADMIN: CPT | Performed by: INTERNAL MEDICINE

## 2023-06-07 PROCEDURE — 1036F TOBACCO NON-USER: CPT | Performed by: INTERNAL MEDICINE

## 2023-06-07 PROCEDURE — 1159F MED LIST DOCD IN RCRD: CPT | Performed by: INTERNAL MEDICINE

## 2023-06-07 PROCEDURE — 3078F DIAST BP <80 MM HG: CPT | Performed by: INTERNAL MEDICINE

## 2023-06-07 PROCEDURE — 99214 OFFICE O/P EST MOD 30 MIN: CPT | Performed by: INTERNAL MEDICINE

## 2023-06-07 PROCEDURE — 90715 TDAP VACCINE 7 YRS/> IM: CPT | Performed by: INTERNAL MEDICINE

## 2023-06-07 PROCEDURE — 1160F RVW MEDS BY RX/DR IN RCRD: CPT | Performed by: INTERNAL MEDICINE

## 2023-06-07 RX ORDER — CIPROFLOXACIN 500 MG/1
500 TABLET ORAL DAILY
Qty: 10 TABLET | Refills: 0 | Status: SHIPPED | OUTPATIENT
Start: 2023-06-07 | End: 2023-06-07 | Stop reason: ENTERED-IN-ERROR

## 2023-06-07 RX ORDER — DOXYCYCLINE 100 MG/1
100 CAPSULE ORAL 2 TIMES DAILY
Qty: 20 CAPSULE | Refills: 0 | Status: SHIPPED | OUTPATIENT
Start: 2023-06-07 | End: 2023-06-17

## 2023-06-07 NOTE — PROGRESS NOTES
Subjective   Patient ID: Edda Tee is a 78 y.o. female who presents for evaluation for injury to her leg         A screw from a wheel [place] ripped her skin, the area is erythematous, inflamed, painful, and black      HEALTH:  PAP not need   Mammo 1/2008 , 2016, 5/19, 8/2020, 8/2021, not needed for now   BD 1/2008 T-0.9, 4/16 T-1.9, 5/19 T-2.1, 8/2021 T-2.5, 1/2023   COLON 6/2008 + adenoma, 5/2012 and declines repeat. Declines Cologuard   EKG 11/17 , 8/18, 6/19, 9/2020, 5/2021, 7/2021, 5/2022, 8/2022, 4/2023 ED   ECHO 9/17 normal, 5/2021, 3/2023   Carotids 9/17 <50%   Urine 2017 , 1/19   Hep C 8/18 -  FLU 1/12/2023  TDAP around 2013, 6/7/2023 with injury   Prevnar 5/17  Pneumo 1/2011  Zostavax never and declines   Shingrix recommend and declines   SARS CVD vaccine declines   Ophth Cataract surgery on 12/6/18 and 12/21/18. She is not wearing glasses except for night driving. No glaucoma or MD.           Review of Systems  All systems negative except those listed in the HPI      Objective   Visit Vitals  /60   Pulse 66   Temp 36 °C (96.8 °F) (Skin)    Body mass index is 21.54 kg/m².     Physical Exam  Vitals reviewed.   Constitutional:       Appearance: Normal appearance.   HENT:      Head: Normocephalic.      Right Ear: Tympanic membrane, ear canal and external ear normal.      Left Ear: Tympanic membrane, ear canal and external ear normal.      Nose: Nose normal.      Mouth/Throat:      Pharynx: Oropharynx is clear.   Eyes:      Conjunctiva/sclera: Conjunctivae normal.   Cardiovascular:      Rate and Rhythm: Normal rate and regular rhythm.      Pulses: Normal pulses.      Heart sounds: Normal heart sounds.   Pulmonary:      Effort: Pulmonary effort is normal.      Breath sounds: Normal breath sounds.   Abdominal:      General: Bowel sounds are normal.      Palpations: Abdomen is soft.   Musculoskeletal:         General: Normal range of motion.      Cervical back: Normal range of motion and neck supple.    Skin:     General: Skin is warm.      Comments: Evaluation of right leg: she has a dusky large scabbed area of approx 2.5 cm circular area with some soft raised tender tissue and erythema all around the shin, no discharge or muscle exposure, no calf tenderness    Neurological:      General: No focal deficit present.      Mental Status: She is alert and oriented to person, place, and time.   Psychiatric:         Mood and Affect: Mood normal.         Behavior: Behavior normal.         Thought Content: Thought content normal.         Judgment: Judgment normal.       Assessment/Plan   Problem List Items Addressed This Visit       Atrial fibrillation with RVR (CMS/[facility])    Benign essential hypertension    COPD (chronic obstructive pulmonary disease) with emphysema (CMS/[facility])    PAD (peripheral artery disease) (CMS/[facility])     Other Visit Diagnoses       Wound of right lower extremity, initial encounter    -  Primary    Relevant Medications    doxycycline (Vibramycin) 100 mg capsule    Other Relevant Orders    Referral to Wound Clinic    XR tibia fibula left 2 views             Follow up completed    Right leg wound: On exam: she has a dusky large scabbed area of approx 2.5 cm circular area with some soft raised tender tissue and erythema all around the shin, no discharge or muscle exposure, no calf tenderness 6/2023  A screw from a wheel [place] ripped her skin, the area is erythematous, inflamed, painful, and black  Prescription given for Doxycycline 100 mg BID, possible side effects discussed with medication   Recommend she take the Abx with food   Tetanus vaccine discussed with patient and recommend she update since last one in 2013.  Patient gives verbal consent to have TD done IO today 6/2023. She tolerated injection well  Consent form signed and completed for TD today 6/7/2023, no complications to report    Recommend and orders placed today for Xray right leg 6/2023  She can apply Vaseline to the area for a protective  barrier.  She is to avoid touching the area and keep area clean and dry   Recommend and orders placed today for wound care 6/2023. Called wound clinic and patient has an appt tomorrow 6/8/2023 at 8:30 am. Patient is aware of this appt.     Admitted on 3/30/2023 and discharged on 4/4/2023 for acute respiratory failure and cardiac tamponade. S/p percutaneous pericardiocentesis on 3/30/2023.   Approximately 400 to 500 cc of blood removed from the pericardial sac.  She presents on 3/30/2023 for elective outpatient ICD placement which was complicated by cardiac tamponade and acute hypoxic respiratory failure.   Pt initially managed in ICU with mechanical ventilation and pericardial drain placement. Pericardial drain has since been removed, repeat echo shows resolution of pericardial effusion.   EP was consulted, they recommended to resume her home medications including Eliquis.   She has made an uneventful recovery, surgical site looks well, and has remained hemodynamically stable, she is currently at her baseline O2 requirements of 2LNC.   She saw Dr Robles in 4/2023      Seen in the ED on 12/4/2022 for mechanical fall   She reports she fell off a stool because she missed the last step and went forward over the stool sliding across the floor. She denies hitting her head.   Patient was found to have a clavicle fracture and healing well and no issues   follow up with ortho      Her hearing is not as good as it used to be   She declines appt with audiology for now 1/2023      She quit smoking in 11/2022 with the use of Zyban   Encouraged continued cessation      Her weight/ BMI is in normal range in office, recommend she maintain     HTN: Stable   Continue atenolol 25 mg daily and hydroxyzine 25 mg TID   ECHO 5/2021 was normal but poor functional capacity   Stress test normal 5/2021  EKG 11/2022 showed a fib with RVR  She saw Tracey Schwab in 8/2022 and discontinued lisinopril  She saw Dr Robles in 4/2023   Recommend she  monitor her BP at home and call with elevated readings.     Admitted on 4/17/2023 and discharged on 4/20/2023 for acute systolic heart failure and afib. S/p DC shock and dual chamber ICD interrogation and reporgramming on 4/20/2023 with Dr Perez  Point-of-care ultrasound 4/2023 at bedside shows no evidence of pericardial effusion, however she does have B-lines in all lung fields consistent with pulmonary edema.    CXR 4/2023 showed pulmonary edema   EKG 4/2023: Demand pacemaker with underlying A-fib, rate of 96 bpm, QTc 477 ms. No ST elevations or depressions, no acute ischemic injury pattern. T wave inversions in lateral leads are new compared to prior EKGs.  Patient was cardioverted on 4/20 and converted to normal sinus rhythm.   She saw Dr Coombs in 5/2023        Long standing atrial Fib with prior TIA in 9/2017:  she is in sinus on exam 6/2023  Admitted on 3/13/2023 and discharged on 3/16/2023 for symptomatic bradycardia, digoxin toxicity     S/p DC shock and dual chamber ICD interrogation and reporgramming on 4/20/2023 with Dr Perez   Continue carvedilol 6.25 mg BID and amiodarone 200 mg daily  Continue spironolactone 25 mg daily and atenolol 25 mg daily  Continue Eliquis 2.5 mg BID   She has been on Pradaxa then Eliquis since 9/17 after she was admitted with TIA 9/17   Carotid duplex: <50% stenosis bilateral vertebra arteries patent with antegrade flow  EKG 3/2023 showing junctional rhythm with no acute ischemic injury pattern appreciated.   EP did an implantable loop on 11/18/2022   She saw Dr Mcneill in 4/2023   She saw Dr Coombs in 5/2023       Middle cerebral aneurysm right side :  She needs CT angio and has to schedule since 2 mm only      I have spent 15 min face to face with this patient discussing their cardiac risk and behavioral therapies of nutrition choices and exercise. We are trying to eliminate habits that are contributing to their cardiac risk.  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  Recommend she have her sons tested as well.     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   Dr Pina did EBUS on 12/19/2022 showed Interval increase in size of right middle lobe mass when compared   with the previous CT from 08/23/2021, concerning for neoplastic etiology. Interval increase in size of multiple mediastinal lymph nodes as detailed above, concerning for metastatic lymphadenopathy. Moderate upper lobe predominant emphysematous changes.   Pathology results 12/2022 showed malignant cells derived from SCC   MRI of brain in 1/2023 negative for acute process   PET/ CT 1/2023 showed intense hypermetabolic activity in a 3.4 cm right middle lobe lung mass c/w malignancy. No evidence of hypermetabolic disease elsewhere  She saw Heme onc in 1/2023 and patient stated she is not interested in surgery but will do RTX  She started RTX on 1/31/2023 and did 5 treatments, last 2/3/2023   She saw Rad Onc in 5/2023 Per notes: Interval decrease in size of a right middle lobe mass, with mild residual activity compatible with post treatment changes. No residual   focal hypermetabolic activity to suggest residual/recurrent disease. No distant hypermetabolic disease.    She saw Rad onc in 5/2023 and will follow again in 8/2023      LE edema:  Continue furosemide 40 mg daily      Insomnia:  She stopped trazodone.  She is sleeping better      Constipation:   Explained systemic  medications that are contributing to her constipation   She is to avoid straining   Continue Mg 200 mg daily.  She can add a stool softener when needed      Neurogenic claudication BLE:   She saw podiatry because she thought the pain in her legs was due to her feet  She saw Dr Roth and he did an US of her legs and it was not good.  Aortogram 7/2021 with lower extremity angiography showed critical proximal right iliac, renal artery stenosis of about 90% or more with heavy calcifications, completely occluded left SFA throughout its length, and a focal mid right SFA stenosis of about 90% had iliac artery stenting via the right femoral approach without complication   She saw Dr Mota in 11/2021 and feels she has PAD versus peripheral neuropathy   MRI of L/S 11/2021 did not show stenosis   She saw Dr Mota in 11/2021 and EMG studies 11/2021 were normal   She restarted PT but on the 3 rd session she pulled a hamstring and has not been back   She continues to do her exercises at home   Continue cilostazol 50 mg daily   She saw Dr Winn in 2/2022 and recommend continued medical management      Spinal Stenosis: her back pain is chronic   She should wait on any spine surgery until pain is consistent and intolerable.   Xray L/S 8/2020 showed severe multilevel spondylosis worse at L4- 5 and mild anterolisthesis of L3 on L4 and retrolisthesis of L1 on L2   She will continue to follow with Dr Almanzar and Dr Porras   She has done PT and chiropractor in the past and declines to do more   Recommend she look into a girdle to help with posture and back pain      She has intermittent cold sores:  She is no longer taking acyclovir      Mammo in 8/2021 was normal. She declines mammo now due to extensive work up and imaging for SCC lung cancer. She had PET/ CT in 1/2023.   Breast exam normal 1/2023  BD in 8/2021 was T-2.5 and ordered 1/2023. Continue Calcium daily and eat 2 servings of calcium enriched foods daily.  Discussed importance of weight bearing exercises.      Colonoscopy with Dr Jain in  was normal.  She knows a couple of people who  during colonoscopy and she is afraid to repeat it. She declines Cologuard because her sister had false positive results.  She declines colonoscopy and Cologuard 2021     Ophth:  Cataract surgery on 18 and 18. She is not wearing glasses except for night driving. No glaucoma or MD.    She will have her last eye exam faxed to my office in order to update her medical records.     She has a LW and MPOA.  Recommend she bring a copy of her Advanced Directives in office to have scanned in her chart 2023     Hep C  -  FLU 2023  TDAP around , 2023 with injury   Prevnar   Pneumo 2011  Zostavax never and declines   Shingrix recommend and declines  SARS CVD vaccine declines   Disability placard given 2023 for 5 years      RTC in 3-4 months for follow up or sooner if needed   (MCR due 2024)     Scribe Attestation  By signing my name below, I, Jessica Blackman , Scribe   attest that this documentation has been prepared under the direction and in the presence of Sunshine Warren MD

## 2023-06-08 NOTE — PATIENT INSTRUCTIONS

## 2023-06-22 DIAGNOSIS — I48.0 PAROXYSMAL ATRIAL FIBRILLATION (MULTI): ICD-10-CM

## 2023-06-22 RX ORDER — AMIODARONE HYDROCHLORIDE 200 MG/1
TABLET ORAL
Qty: 30 TABLET | Refills: 0 | Status: SHIPPED | OUTPATIENT
Start: 2023-06-22 | End: 2023-07-29

## 2023-07-07 ENCOUNTER — PATIENT OUTREACH (OUTPATIENT)
Dept: CARE COORDINATION | Facility: CLINIC | Age: 78
End: 2023-07-07
Payer: MEDICARE

## 2023-07-07 NOTE — PROGRESS NOTES
Patient has met target of no readmission for 90 days post (hospital, SNF, rehab) discharge and is graduated from Transitional Care Management program at this time.Edda was doing ok today and her son and grand baby were visiting which made her happy.

## 2023-07-18 ENCOUNTER — TELEPHONE (OUTPATIENT)
Dept: PRIMARY CARE | Facility: CLINIC | Age: 78
End: 2023-07-18
Payer: MEDICARE

## 2023-07-24 NOTE — PROGRESS NOTES
Subjective   Patient ID: Edda Tee is a 78 y.o. female who presents for evaluation for her need to remain on O2.       Her son passed on 6/27/2023 from cirrhosis.   He did quit drinking.     She is smoking 2 cigarettes a day   She is trying not to smoke because she does not want to go through withdrawals again.      She is not using her O2 at all.  She feels fine. She is never SOB.     Dr Robles is leaving and she has to find a new cardiologist      She is not eating well but had a nice meal last night  She sometimes has emesis postprandial     HEALTH:  PAP not need   Mammo 1/2008 , 2016, 5/19, 8/2020, 8/2021, not needed for now   BD 1/2008 T-0.9, 4/16 T-1.9, 5/19 T-2.1, 8/2021 T-2.5, 1/2023   COLON 6/2008 + adenoma, 5/2012 and declines repeat. Declines Cologuard   EKG 11/17 , 8/18, 6/19, 9/2020, 5/2021, 7/2021, 5/2022, 8/2022, 4/2023 ED   ECHO 9/17 normal, 5/2021, 3/2023   Carotids 9/17 <50%   Urine 2017 , 1/19   Hep C 8/18 -  FLU 1/12/2023  TDAP around 2013, 6/7/2023 with injury   Prevnar 5/17  Pneumo 1/2011  Zostavax never and declines   Shingrix recommend and declines   SARS CVD vaccine declines   Ophth Cataract surgery on 12/6/18 and 12/21/18. She is not wearing glasses except for night driving. No glaucoma or MD.       Review of Systems  All systems negative except those listed in the HPI       Objective   Visit Vitals  /68   Pulse 68   Temp 36.2 °C (97.1 °F)    Body mass index is 20.97 kg/m².      Physical Exam  Vitals reviewed.   Constitutional:       Appearance: Normal appearance. She is normal weight.   HENT:      Head: Normocephalic.      Right Ear: Tympanic membrane, ear canal and external ear normal.      Left Ear: Tympanic membrane, ear canal and external ear normal.      Nose: Nose normal.      Mouth/Throat:      Pharynx: Oropharynx is clear.   Eyes:      Conjunctiva/sclera: Conjunctivae normal.   Cardiovascular:      Rate and Rhythm: Normal rate and regular rhythm.      Pulses: Normal  pulses.      Heart sounds: Normal heart sounds.      Comments: No wheezing, rhonchi or moisture  Pulmonary:      Effort: Pulmonary effort is normal.      Breath sounds: Normal breath sounds.   Abdominal:      General: Bowel sounds are normal.      Palpations: Abdomen is soft.   Musculoskeletal:         General: Normal range of motion.      Cervical back: Normal range of motion and neck supple.   Skin:     General: Skin is warm.   Neurological:      General: No focal deficit present.      Mental Status: She is alert and oriented to person, place, and time.   Psychiatric:         Mood and Affect: Mood normal.         Behavior: Behavior normal.         Thought Content: Thought content normal.         Judgment: Judgment normal.       Assessment/Plan   Problem List Items Addressed This Visit       Protein-calorie malnutrition, unspecified severity (CMS/HCC) - Primary       Follow up completed  Reviewed her labs from 4/2023     Her son passed on 6/27/2023 from cirrhosis.      Right leg wound:    She completed a full course of Doxycycline    Xray right LE 6/2023 No evidence for acute osseous injury to the right tibia or fibula.  She went to the wound clinic     She is smoking 2 cigarettes a day. She is trying not to smoke because she does not want to go through withdrawals again.   Nicotine dependence I spent more than 3 minutes counseling patient about the need for smoking cessation and how I can support efforts when patient is ready to quit. Discussed nicotine replacement therapy, Varenicline, Buproprion, hypnosis, support groups and acupuncture as potential options. Patient currently has no signs or symptoms of tobacco related disease.  (Dx code F17.2 or Z87.891)(Billing code 09914 or 60321)       Her hearing is not as good as it used to be   She declines appt with audiology for now 1/2023      Her weight/ BMI is in normal range in office, recommend she maintain  She is not eating well but had a nice meal last  night  She sometimes has emesis postprandial       HTN: Stable   Continue atenolol 25 mg daily and hydroxyzine 25 mg TID   ECHO 5/2021 was normal but poor functional capacity   Stress test normal 5/2021  EKG 11/2022 showed a fib with RVR  Recommend she monitor her BP at home and call with elevated readings.    She saw Tracey Schwab in 8/2022 and discontinued lisinopril  She saw Dr Robles in 4/2023. Dr Robles is leaving and she has to find a new cardiologist. Recommend seeing Dr Vicki Martinez or Dr Perez      Admitted on 3/30/2023 and discharged on 4/4/2023 for acute respiratory failure and cardiac tamponade. She is not using her O2 at all. She feels fine. She is never SOB. I will complete the paperwork to have the O2 removed from her home 7/2023.   S/p percutaneous pericardiocentesis on 3/30/2023.   Approximately 400 to 500 cc of blood removed from the pericardial sac.  She presented on 3/30/2023 for elective outpatient ICD placement which was complicated by cardiac tamponade and acute hypoxic respiratory failure.   Pt initially managed in ICU with mechanical ventilation and pericardial drain placement. Pericardial drain has since been removed, repeat echo shows resolution of pericardial effusion.   She saw Dr Robles in 4/2023. Dr Robles is leaving and she has to find a new cardiologist. Recommend seeing Dr Vicki Martinez or Dr Perez       Long standing atrial Fib with prior TIA in 9/2017:     Patient was cardioverted on 4/20 and converted to normal sinus rhythm.    EP did an implantable loop on 11/18/2022     S/p DC shock and dual chamber ICD interrogation and reporgramming on 4/20/2023 with Dr Perez   Continue carvedilol 6.25 mg BID and amiodarone 200 mg daily  Continue spironolactone 25 mg daily and atenolol 25 mg daily  Continue Eliquis 2.5 mg BID    Ultrasound 4/2023 at bedside shows no evidence of pericardial effusion, however she does have B-lines in all lung fields consistent with pulmonary  edema.    CXR 4/2023 showed pulmonary edema   EKG 4/2023: Demand pacemaker with underlying A-fib, rate of 96 bpm, QTc 477 ms. No ST elevations or depressions, no acute ischemic injury pattern. T wave inversions in lateral leads are new compared to prior EKGs.  Carotid duplex: <50% stenosis bilateral vertebra arteries patent with antegrade flow  She was on Pradaxa then Eliquis since 9/17 after she was admitted with TIA 9/17   She saw Dr Mcneill in 4/2023. Dr Robles is leaving and she has to find a new cardiologist. Recommend seeing Dr Edward, Dr Cohen or Dr Perez    She saw Dr Coombs in 5/2023       Middle cerebral aneurysm right side :  She needs CT angio and has to schedule since 2 mm only      I have spent 15 min face to face with this patient discussing their cardiac risk and behavioral therapies of nutrition choices and exercise. We are trying to eliminate habits that are contributing to their cardiac risk.  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   Dr Pina did EBUS on 12/19/2022 showed Interval increase in size of right middle lobe mass when compared with the previous CT from 08/23/2021, concerning for neoplastic etiology. Interval increase in size of multiple mediastinal lymph nodes as detailed above, concerning for metastatic lymphadenopathy. Moderate upper lobe predominant emphysematous changes.   Pathology results 12/2022 showed malignant cells derived  from SCC   MRI of brain in 1/2023 negative for acute process   She saw [facility] in 1/2023 and patient is not interested in surgery but will do RTX. She started RTX on 1/31/2023 and did 5 treatments, last 2/3/2023   PET/ CT 5/2023 Interval decrease in size of a right middle lobe mass, with mild  residual activity compatible with post treatment changes. No residual focal hypermetabolic activity to suggest residual/recurrent disease. No distant hypermetabolic disease.  She saw Rad Onc in 5/2023 Per notes: Interval decrease in size of a right middle lobe mass, with mild residual activity compatible with post treatment changes. No residual   focal hypermetabolic activity to suggest residual/recurrent disease. No distant hypermetabolic disease.    She will follow again with Rad Onc in 8/2023   She saw [facility] in 7/2023      LE edema:  Continue furosemide 40 mg daily   Monitor daily sodium intake  Elevate legs for 45 minutes in the afternoons   Elevate legs as much as possible during the day      Insomnia:  She stopped trazodone.  She is sleeping better      Constipation:   Explained systemic medications that are contributing to her constipation   She is to avoid straining with bowel movement   Continue Mg 200 mg daily.  She can add a stool softener when needed      Neurogenic claudication BLE:   She saw podiatry because she thought the pain in her legs was due to her feet  She saw Dr Roth and he did an US of her legs and it was not good.  Aortogram 7/2021 with lower extremity angiography showed critical proximal right iliac, renal artery stenosis of about 90% or more with heavy calcifications, completely occluded left SFA throughout its length, and a focal mid right SFA stenosis of about 90% had iliac artery stenting via the right femoral approach without complication   She saw Dr Mota in 11/2021 and feels she has PAD versus peripheral neuropathy   MRI of L/S 11/2021 did not show stenosis   She saw Dr Mota in  2021 and EMG studies 2021 were normal   She restarted PT but on the 3 rd session she pulled a hamstring and has not been back   She continues to do her exercises at home   Continue cilostazol 50 mg daily   She saw Dr Winn in 2022 and recommend continued medical management     Seen in the ED on 2022 for mechanical fall   She reports she fell off a stool because she missed the last step and went forward over the stool sliding across the floor. She denies hitting her head.   Patient was found to have a clavicle fracture and healing well and no issues   She will follow up with ortho       Spinal Stenosis: her back pain is chronic   She should wait on any spine surgery until pain is consistent and intolerable.   Xray L/S 2020 showed severe multilevel spondylosis worse at L4- 5 and mild anterolisthesis of L3 on L4 and retrolisthesis of L1 on L2   She will continue to follow with Dr Almanzar and Dr Porras   She has done PT and chiropractor in the past and declines to do more   Recommend she look into a girdle to help with posture and back pain      She has intermittent cold sores:  She is no longer taking acyclovir      Mammo in 2021 was normal. She declines mammo now due to extensive work up and imaging for SCC lung cancer. She had PET/ CT in 2023.   Breast exam normal 2023  BD in 2021 was T-2.5 and ordered 2023. Continue Calcium daily and eat 2 servings of calcium enriched foods daily. Discussed importance of weight bearing exercises.      Colonoscopy with Dr Jain in  was normal.  She knows a couple of people who  during colonoscopy and she is afraid to repeat it. She declines Cologuard because her sister had false positive results.  She declines colonoscopy and Cologuard 2021     Ophth:  Cataract surgery on 18 and 18. She is not wearing glasses except for night driving. No glaucoma or MD.    She will have her last eye exam faxed to my office in order to update her  medical records.     She has a LW and MPOA.  Recommend she bring a copy of her Advanced Directives in office to have scanned in her chart 1/2023     Hep C 8/18 -  FLU 1/12/2023  TDAP around 2013, 6/7/2023 with injury   Prevnar 5/17  Pneumo 1/2011  Zostavax never and declines   Shingrix recommend and declines  SARS CVD vaccine declines   Disability placard given 5/2023 for 5 years     Some elements in the chart were copied from Dr. Warren's last office visit with patient.   Notes have been updated where appropriate, and reflect my current medical decision making from today.       RTC in 4 months for follow up or sooner if needed   (MCR due 1/2024)     Scribe Attestation  By signing my name below, I, Jessica Blackman , Scribe   attest that this documentation has been prepared under the direction and in the presence of Sunshine Warren, MDPatient was identified as a fall risk. Risk prevention instructions provided.

## 2023-07-25 ENCOUNTER — OFFICE VISIT (OUTPATIENT)
Dept: PRIMARY CARE | Facility: CLINIC | Age: 78
End: 2023-07-25
Payer: MEDICARE

## 2023-07-25 VITALS
WEIGHT: 111 LBS | HEIGHT: 61 IN | DIASTOLIC BLOOD PRESSURE: 68 MMHG | HEART RATE: 68 BPM | TEMPERATURE: 97.1 F | SYSTOLIC BLOOD PRESSURE: 118 MMHG | BODY MASS INDEX: 20.96 KG/M2 | OXYGEN SATURATION: 96 %

## 2023-07-25 DIAGNOSIS — I73.9 CLAUDICATION, INTERMITTENT (CMS-HCC): ICD-10-CM

## 2023-07-25 DIAGNOSIS — I42.0 DILATED CARDIOMYOPATHY (MULTI): ICD-10-CM

## 2023-07-25 DIAGNOSIS — I48.91 ATRIAL FIBRILLATION WITH RVR (MULTI): ICD-10-CM

## 2023-07-25 DIAGNOSIS — M79.605 PAIN IN BOTH LOWER EXTREMITIES: ICD-10-CM

## 2023-07-25 DIAGNOSIS — M79.604 PAIN IN BOTH LOWER EXTREMITIES: ICD-10-CM

## 2023-07-25 DIAGNOSIS — I10 BENIGN ESSENTIAL HYPERTENSION: ICD-10-CM

## 2023-07-25 DIAGNOSIS — J43.9 PULMONARY EMPHYSEMA, UNSPECIFIED EMPHYSEMA TYPE (MULTI): ICD-10-CM

## 2023-07-25 DIAGNOSIS — E46 PROTEIN-CALORIE MALNUTRITION, UNSPECIFIED SEVERITY (MULTI): Primary | ICD-10-CM

## 2023-07-25 DIAGNOSIS — I50.22 CHRONIC SYSTOLIC CONGESTIVE HEART FAILURE (MULTI): ICD-10-CM

## 2023-07-25 PROCEDURE — 1036F TOBACCO NON-USER: CPT | Performed by: INTERNAL MEDICINE

## 2023-07-25 PROCEDURE — 1160F RVW MEDS BY RX/DR IN RCRD: CPT | Performed by: INTERNAL MEDICINE

## 2023-07-25 PROCEDURE — 3078F DIAST BP <80 MM HG: CPT | Performed by: INTERNAL MEDICINE

## 2023-07-25 PROCEDURE — 99214 OFFICE O/P EST MOD 30 MIN: CPT | Performed by: INTERNAL MEDICINE

## 2023-07-25 PROCEDURE — 3074F SYST BP LT 130 MM HG: CPT | Performed by: INTERNAL MEDICINE

## 2023-07-25 PROCEDURE — 1159F MED LIST DOCD IN RCRD: CPT | Performed by: INTERNAL MEDICINE

## 2023-07-25 NOTE — PATIENT INSTRUCTIONS

## 2023-07-26 NOTE — TELEPHONE ENCOUNTER
I called [facility] at [phone] to have oxygen discontinued, they need a discontinue order  to be faxed  to [phone] with last office note and also put note on there that she does not need a nightime oxymetry because that is a test they said she would need to do before they discontinue oxygen. Please advise when done.

## 2023-07-29 DIAGNOSIS — L29.9 ITCHING: ICD-10-CM

## 2023-07-29 DIAGNOSIS — I48.0 PAROXYSMAL ATRIAL FIBRILLATION (MULTI): ICD-10-CM

## 2023-07-29 RX ORDER — HYDROXYZINE HYDROCHLORIDE 25 MG/1
25 TABLET, FILM COATED ORAL 3 TIMES DAILY
Qty: 90 TABLET | Refills: 0 | Status: SHIPPED | OUTPATIENT
Start: 2023-07-29 | End: 2023-09-29

## 2023-07-29 RX ORDER — AMIODARONE HYDROCHLORIDE 200 MG/1
TABLET ORAL
Qty: 30 TABLET | Refills: 0 | Status: SHIPPED | OUTPATIENT
Start: 2023-07-29 | End: 2023-10-30

## 2023-08-21 DIAGNOSIS — I48.91 ATRIAL FIBRILLATION WITH RVR (MULTI): Primary | ICD-10-CM

## 2023-09-04 NOTE — PROGRESS NOTES
Subjective   Patient ID: Edda Tee is a 78 y.o. female who presents for her 2 month follow up multiple medical conditions         A rake fell on her right leg and scraped the leg that happened approx a week ago 8/2023.  She bruises easily due to being on a blood thinner     She complains of a terrible cough that is productive for approx 4 weeks  She noticed the sputum was thicker and green in color yesterday 9/4/2023  If she takes deep inspiration she coughs and talking makes her cough      She is going to repeat the CT scan in 11/2023.     She is still dealing with the neuropathy   She is able to walk but not far    She is fatigued still.    Sometimes she has emesis when taking her medications   She started having emesis when her son passed.   Sometimes she does not take her morning tablets to avoid emesis     She is smoking 2 cigarettes a day.  She would like to go back on Wellbutrin and try the nicotine patches to quit smoking       HEALTH:  PAP not need   Mammo 1/2008 , 2016, 5/19, 8/2020, 8/2021, not needed for now   BD 1/2008 T-0.9, 4/16 T-1.9, 5/19 T-2.1, 8/2021 T-2.5, 8/2023 was T-3.1.   Colon 6/2008 + adenoma, 5/2012 and declines repeat. Declines Cologuard   EKG 11/17 , 8/18, 6/19, 9/2020, 5/2021, 7/2021, 5/2022, 8/2022, 4/2023 ED   ECHO 9/17 normal, 5/2021, 3/2023   Carotids 9/17 <50%   Urine 2017 , 1/19   Hep C 8/18 -  FLU 1/12/2023  TDAP around 2013, 6/7/2023 with injury   Arexvy recommended and will consider   Prevnar 5/17  Pneumo 1/2011  Zostavax never and declines   Shingrix recommend and declines   SARS CVD vaccine declines   Ophth Cataract surgery on 12/6/18 and 12/21/18. She is not wearing glasses except for night driving. No glaucoma or MD.  Copy of her Advanced Directives scanned in her chart 3/2023       Review of Systems  All systems negative except those listed in the HPI      Objective   Visit Vitals  /60 (BP Location: Left arm, Patient Position: Sitting, BP Cuff Size: Adult)    Pulse 68   Temp 36 °C (96.8 °F) (Temporal)    Body mass index is 20.6 kg/m².     Physical Exam  Vitals reviewed.   Constitutional:       Appearance: Normal appearance. She is normal weight.      Comments: Tearful in exam room    HENT:      Head: Normocephalic.      Right Ear: Tympanic membrane, ear canal and external ear normal.      Left Ear: Tympanic membrane, ear canal and external ear normal.      Nose: Nose normal.      Mouth/Throat:      Comments: oropharynx erythematous from smoking  Eyes:      Conjunctiva/sclera: Conjunctivae normal.   Cardiovascular:      Rate and Rhythm: Normal rate and regular rhythm.      Pulses: Normal pulses.      Heart sounds: Normal heart sounds.      Comments: sinus  Pulmonary:      Effort: Pulmonary effort is normal.      Comments: rhonchi, faint wheezing noted  Abdominal:      General: Bowel sounds are normal.      Palpations: Abdomen is soft.   Musculoskeletal:         General: Normal range of motion.      Cervical back: Normal range of motion and neck supple.   Skin:     General: Skin is warm.      Comments: erythema right leg, scab is healing    Neurological:      General: No focal deficit present.      Mental Status: She is alert and oriented to person, place, and time.   Psychiatric:         Mood and Affect: Mood normal.         Behavior: Behavior normal.         Thought Content: Thought content normal.         Judgment: Judgment normal.       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   Sometimes she does not take her morning tablets to avoid emesis    Medication reconciliation performed with patient. Discussed importance of compliance with her medications   She is still grieving the passing of her son in 6/2023. She admits she has depression about his passing.    She will try to eat prior to taking her morning medications and see if that helps     Situational depression: She is tearful in exam room 9/2023.   Her son passed on 6/27/2023 from cirrhosis.    She is still grieving the passing of her son in 6/2023.   She admits she has depression about her sons passing.    She is happy when she gets to see her grandchildren  I will prescribe Wellbutrin for smoking cessation and that should help her with her depression as well.   Prescription sent in for Wellbutrin 150 mg daily, possible side effects discussed     Acute cellulitis right lower extremity: On exam: erythema right leg, scab is healing 9/2023  A rake fell on her right leg and scraped the leg that happened approx a week ago 8/2023.  She bruises easily due to being on a blood thinner   We will be treating her bronchitis with Abx and that will cover her leg for infection   Prescription sent in for Augmentin 875 mg BID, possible side effects discussed with medication    She will call if Sx persist or worsen     Acute bronchitis: On exam: oropharynx erythematous from smoking, rhonchi, faint wheezing noted 9/2023  She complains of a terrible cough that is productive for approx 4 weeks  She noticed the sputum was thicker and green in color yesterday 9/4/2023  If she takes deep inspiration she coughs and talking makes her cough    Prescription sent in for Augmentin 875 mg BID, possible side effects  discussed with medication   Encouraged rest and increased hydration with warm/ tepid fluids  She will call if Sx persist or worsen      She is smoking 2 cigarettes a day. Prescription sent in for Wellbutrin 150 mg daily, possible side effects discussed   Nicotine dependence I spent more than 3 minutes counseling patient about the need for smoking cessation and how I can support efforts when patient is ready to quit. Discussed nicotine replacement therapy, Varenicline, Buproprion, hypnosis, support groups and acupuncture as potential options. Patient currently has no signs or symptoms of tobacco related disease.  (Dx code F17.2 or Z87.891)(Billing code 99641 or 77482)       Her hearing is not as good as it used to be   She declines appt with audiology for now 1/2023      Her weight/ BMI is in normal range in office, recommend she maintain  Her weight is 109 and BMI 20.60 IO 9/2023   She is not eating well but had a nice meal last night  She sometimes has emesis postprandial       HTN: Stable. Explained her systemic medications are contributing to her fatigue   Continue atenolol 25 mg daily and hydroxyzine 25 mg TID   ECHO 5/2021 was normal but poor functional capacity   Stress test normal 5/2021  EKG 11/2022 showed a fib with RVR  Recommend she monitor her BP at home and call with elevated readings.    She saw Tracey Schwab in 8/2022 and discontinued lisinopril  She saw Dr Robles in 4/2023. Dr Robles is leaving and she has to find a new cardiologist. Recommend seeing Dr Edward, Dr Cohen or Dr Preez      Acute respiratory failure and cardiac tamponade.   Admitted on 3/30/2023 and discharged on 4/4/2023  I completed the paperwork to have the O2 removed from her home 7/2023.   S/p percutaneous pericardiocentesis on 3/30/2023.   Approximately 400 to 500 cc of blood removed from the pericardial sac.  She presented on 3/30/2023 for elective outpatient ICD placement which was complicated by cardiac tamponade and acute  hypoxic respiratory failure.   Pt initially managed in ICU with mechanical ventilation and pericardial drain placement. Pericardial drain has since been removed, repeat echo shows resolution of pericardial effusion.   She saw Dr Robles in 4/2023. Dr Robles is leaving and she has to find a new cardiologist. Recommend seeing Dr Vicki Martinez or Dr Perez       Long standing atrial Fib with prior TIA in 9/2017:     Patient was cardioverted on 4/20 and converted to normal sinus rhythm.    EP did an implantable loop on 11/18/2022     S/p DC shock and dual chamber ICD interrogation and reporgramming on 4/20/2023 with Dr Perez   Continue carvedilol 6.25 mg BID and amiodarone 200 mg daily  Continue spironolactone 25 mg daily and atenolol 25 mg daily  Continue Eliquis 2.5 mg BID    US 4/2023 at bedside shows no evidence of pericardial effusion, however she does have B-lines in all lung fields consistent with pulmonary edema.    CXR 4/2023 showed pulmonary edema   EKG 4/2023: Demand pacemaker with underlying A-fib, rate of 96 bpm, QTc 477 ms. No ST elevations or depressions, no acute ischemic injury pattern. T wave inversions in lateral leads are new compared to prior EKGs.  Carotid duplex: <50% stenosis bilateral vertebra arteries patent with antegrade flow  She was on Pradaxa then Eliquis since 9/17 after she was admitted with TIA 9/17   She saw Dr Mcneill in 4/2023. Dr Robles is leaving and she has to find a new cardiologist. Recommend seeing Dr Vicki Martinez or Dr Perez    She saw Dr Coombs in 5/2023       Middle cerebral aneurysm right side :  She needs CT angio and has to schedule since 2 mm only      I have spent 15 min face to face with this patient discussing their cardiac risk and behavioral therapies of nutrition choices and exercise. We are trying to eliminate habits that are contributing to their cardiac risk.  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  Her son Oliver had hemochromatosis      Right middle lobe lung cancer: She is going to repeat the CT scan in 11/2023.   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   Dr Pina did EBUS on 12/19/2022 showed Interval increase in size of right middle lobe mass when compared with the previous CT from 08/23/2021, concerning for neoplastic etiology. Interval increase in size of multiple mediastinal lymph nodes as detailed above, concerning for metastatic lymphadenopathy. Moderate upper lobe predominant emphysematous changes.   Pathology results 12/2022 showed malignant cells derived from SCC   MRI of brain in 1/2023 negative for acute process   She started RTX on 1/31/2023 and did 5 treatments, last 2/3/2023   PET/ CT 5/2023 Interval decrease in size of a right middle lobe mass, with mild residual activity compatible with post treatment changes. No residual focal hypermetabolic activity to suggest residual/recurrent disease. No distant hypermetabolic disease.  She saw Rad Onc in 5/2023 Per notes: Interval decrease in size of a right middle lobe mass, with mild residual activity compatible with post treatment changes. No residual focal hypermetabolic activity to suggest residual/recurrent disease. No distant hypermetabolic disease.    She will follow again with Rad Onc in 8/2023   She saw Heme onc in 7/2023      LE edema:  Continue furosemide 40 mg daily   Monitor daily sodium intake  Elevate legs for 45 minutes in the afternoons   Elevate legs as much as possible during the day       Insomnia:  She stopped trazodone.  She is sleeping better      Constipation:   Explained systemic medications that are contributing to her constipation   She is to avoid straining with bowel movement   Continue Mg 200 mg daily.  She can add a stool softener when needed      Neurogenic claudication BLE:   She saw podiatry, she thought the pain in her legs was due to her feet  She saw Dr Roth and he did an US of her legs and it was not good.  Aortogram 7/2021 with lower extremity angiography showed critical proximal right iliac, renal artery stenosis of about 90% or more with heavy calcifications, completely occluded left SFA throughout its length, and a focal mid right SFA stenosis of about 90% had iliac artery stenting via the right femoral approach without complication   She saw Dr Mota in 11/2021 and feels she has PAD versus peripheral neuropathy   MRI of L/S 11/2021 did not show stenosis   She saw Dr Mota in 11/2021 and EMG studies 11/2021 were normal   She restarted PT but on the 3 rd session she pulled a hamstring and has not been back. She continues to do her exercises at home   Continue cilostazol 50 mg daily   She saw Dr Winn in 2/2022 and recommend continued medical management      Seen in the ED on 12/4/2022 for mechanical fall   She reports she fell off a stool because she missed the last step and went forward over the stool sliding across the floor. She denies hitting her head.   Patient was found to have a clavicle fracture and healing well and no issues   She will follow up with ortho       Spinal Stenosis: her back pain is chronic   She should wait on any spine surgery until pain is consistent and intolerable.   Xray L/S 8/2020 showed severe multilevel spondylosis worse at L4- 5 and mild anterolisthesis of L3 on L4 and retrolisthesis of L1 on L2   She will continue to follow with Dr Almanzar and Dr Porras   She has done PT and chiropractor in the past and declines to do more    Recommend she look into a girdle to help with posture and back pain      She has intermittent cold sores:  She is no longer taking acyclovir      Mammo in 2021 was normal. She declines mammo now due to extensive work up and imaging for SCC lung cancer.   She had PET/ CT in 2023.   Breast exam normal 2023  BD in 2023 was T-3.1. Reviewed the results of her BD from 2023 with patient in detail 2023. Explained she is osteoporosis stage. Discussed systemic medications to treat OP along with possible side effects. She will consider medications 2023  Continue Calcium daily and eat 2 servings of calcium enriched foods daily.   Discussed importance of weight bearing exercises.      Colonoscopy with Dr Jain in  was normal.  She knows a couple of people who  during colonoscopy and she is afraid to repeat it. She declines Cologuard because her sister had false positive results.  She declines colonoscopy and Cologuard 2021     Ophth:  Cataract surgery on 18 and 18. She is not wearing glasses except for night driving. No glaucoma or MD.    She will have her last eye exam faxed to my office in order to update her medical records.     She has a LW and MPOA.  Copy of her Advanced Directives scanned in her chart 3/2023     Hep C  -  FLU 2023  TDAP around , 2023 with injury   Arexvy recommended and will consider   Prevnar   Pneumo 2011  Zostavax never and declines   Shingrix recommend and declines   SARS CVD vaccine declines    Disability placard given 2023 for 5 years      Some elements in the chart were copied from Dr. Warren's last office visit with patient.   Notes have been updated where appropriate, and reflect my current medical decision making from today.       RTC in 4 months for follow up or sooner if needed   (MCR due 2024)     Scribe Attestation  By signing my name below, IJessica , Scribe   attest that this documentation has been prepared under the  direction and in the presence of Sunshine Warren, MDPatient was identified as a fall risk. Risk prevention instructions provided.

## 2023-09-05 ENCOUNTER — OFFICE VISIT (OUTPATIENT)
Dept: PRIMARY CARE | Facility: CLINIC | Age: 78
End: 2023-09-05
Payer: MEDICARE

## 2023-09-05 ENCOUNTER — LAB (OUTPATIENT)
Dept: LAB | Facility: LAB | Age: 78
End: 2023-09-05
Payer: MEDICARE

## 2023-09-05 VITALS
TEMPERATURE: 96.8 F | OXYGEN SATURATION: 96 % | WEIGHT: 109 LBS | HEART RATE: 68 BPM | BODY MASS INDEX: 20.58 KG/M2 | SYSTOLIC BLOOD PRESSURE: 110 MMHG | DIASTOLIC BLOOD PRESSURE: 60 MMHG | HEIGHT: 61 IN

## 2023-09-05 DIAGNOSIS — F43.21 SITUATIONAL DEPRESSION: ICD-10-CM

## 2023-09-05 DIAGNOSIS — J43.9 PULMONARY EMPHYSEMA, UNSPECIFIED EMPHYSEMA TYPE (MULTI): ICD-10-CM

## 2023-09-05 DIAGNOSIS — I10 BENIGN ESSENTIAL HYPERTENSION: ICD-10-CM

## 2023-09-05 DIAGNOSIS — R06.02 EXERTIONAL SHORTNESS OF BREATH: ICD-10-CM

## 2023-09-05 DIAGNOSIS — L03.115 CELLULITIS OF RIGHT LOWER EXTREMITY: Primary | ICD-10-CM

## 2023-09-05 DIAGNOSIS — J20.9 ACUTE BRONCHITIS, UNSPECIFIED ORGANISM: ICD-10-CM

## 2023-09-05 DIAGNOSIS — R53.83 MALAISE AND FATIGUE: ICD-10-CM

## 2023-09-05 DIAGNOSIS — R53.81 MALAISE AND FATIGUE: ICD-10-CM

## 2023-09-05 DIAGNOSIS — F17.200 TOBACCO DEPENDENCE: ICD-10-CM

## 2023-09-05 DIAGNOSIS — C34.31 MALIGNANT NEOPLASM OF LOWER LOBE OF RIGHT LUNG (MULTI): ICD-10-CM

## 2023-09-05 DIAGNOSIS — L03.115 CELLULITIS OF RIGHT LOWER EXTREMITY: ICD-10-CM

## 2023-09-05 DIAGNOSIS — I42.0 DILATED CARDIOMYOPATHY (MULTI): ICD-10-CM

## 2023-09-05 DIAGNOSIS — I48.91 ATRIAL FIBRILLATION WITH RVR (MULTI): ICD-10-CM

## 2023-09-05 DIAGNOSIS — R20.0 NUMBNESS IN BOTH LEGS: ICD-10-CM

## 2023-09-05 DIAGNOSIS — G95.19: ICD-10-CM

## 2023-09-05 DIAGNOSIS — I51.7 CARDIOMEGALY: ICD-10-CM

## 2023-09-05 DIAGNOSIS — I73.9 CLAUDICATION, INTERMITTENT (CMS-HCC): ICD-10-CM

## 2023-09-05 DIAGNOSIS — M81.0 AGE-RELATED OSTEOPOROSIS WITHOUT CURRENT PATHOLOGICAL FRACTURE: ICD-10-CM

## 2023-09-05 PROBLEM — I50.9 CHF EXACERBATION (MULTI): Status: RESOLVED | Noted: 2023-09-05 | Resolved: 2023-09-05

## 2023-09-05 PROBLEM — R53.1 WEAKNESS: Status: ACTIVE | Noted: 2023-09-05

## 2023-09-05 PROBLEM — I50.9 CHF EXACERBATION (MULTI): Status: ACTIVE | Noted: 2023-09-05

## 2023-09-05 PROBLEM — M85.88 OSTEOPENIA OF SPINE: Status: RESOLVED | Noted: 2023-04-08 | Resolved: 2023-09-05

## 2023-09-05 PROBLEM — I50.43: Status: ACTIVE | Noted: 2023-09-05

## 2023-09-05 LAB
ALANINE AMINOTRANSFERASE (SGPT) (U/L) IN SER/PLAS: 20 U/L (ref 7–45)
ALBUMIN (G/DL) IN SER/PLAS: 3.9 G/DL (ref 3.4–5)
ALKALINE PHOSPHATASE (U/L) IN SER/PLAS: 124 U/L (ref 33–136)
ANION GAP IN SER/PLAS: 13 MMOL/L (ref 10–20)
ASPARTATE AMINOTRANSFERASE (SGOT) (U/L) IN SER/PLAS: 26 U/L (ref 9–39)
BILIRUBIN TOTAL (MG/DL) IN SER/PLAS: 0.5 MG/DL (ref 0–1.2)
CALCIUM (MG/DL) IN SER/PLAS: 9.3 MG/DL (ref 8.6–10.3)
CARBON DIOXIDE, TOTAL (MMOL/L) IN SER/PLAS: 26 MMOL/L (ref 21–32)
CHLORIDE (MMOL/L) IN SER/PLAS: 103 MMOL/L (ref 98–107)
CREATININE (MG/DL) IN SER/PLAS: 1.84 MG/DL (ref 0.5–1.05)
ERYTHROCYTE DISTRIBUTION WIDTH (RATIO) BY AUTOMATED COUNT: 15.4 % (ref 11.5–14.5)
ERYTHROCYTE MEAN CORPUSCULAR HEMOGLOBIN CONCENTRATION (G/DL) BY AUTOMATED: 32.3 G/DL (ref 32–36)
ERYTHROCYTE MEAN CORPUSCULAR VOLUME (FL) BY AUTOMATED COUNT: 102 FL (ref 80–100)
ERYTHROCYTES (10*6/UL) IN BLOOD BY AUTOMATED COUNT: 3.94 X10E12/L (ref 4–5.2)
GFR FEMALE: 28 ML/MIN/1.73M2
GLUCOSE (MG/DL) IN SER/PLAS: 89 MG/DL (ref 74–99)
HEMATOCRIT (%) IN BLOOD BY AUTOMATED COUNT: 40 % (ref 36–46)
HEMOGLOBIN (G/DL) IN BLOOD: 12.9 G/DL (ref 12–16)
LEUKOCYTES (10*3/UL) IN BLOOD BY AUTOMATED COUNT: 9.3 X10E9/L (ref 4.4–11.3)
PLATELETS (10*3/UL) IN BLOOD AUTOMATED COUNT: 160 X10E9/L (ref 150–450)
POTASSIUM (MMOL/L) IN SER/PLAS: 5 MMOL/L (ref 3.5–5.3)
PROTEIN TOTAL: 7.7 G/DL (ref 6.4–8.2)
SODIUM (MMOL/L) IN SER/PLAS: 137 MMOL/L (ref 136–145)
THYROTROPIN (MIU/L) IN SER/PLAS BY DETECTION LIMIT <= 0.05 MIU/L: 1.51 MIU/L (ref 0.44–3.98)
UREA NITROGEN (MG/DL) IN SER/PLAS: 34 MG/DL (ref 6–23)

## 2023-09-05 PROCEDURE — 3074F SYST BP LT 130 MM HG: CPT | Performed by: INTERNAL MEDICINE

## 2023-09-05 PROCEDURE — 1036F TOBACCO NON-USER: CPT | Performed by: INTERNAL MEDICINE

## 2023-09-05 PROCEDURE — 3078F DIAST BP <80 MM HG: CPT | Performed by: INTERNAL MEDICINE

## 2023-09-05 PROCEDURE — 84443 ASSAY THYROID STIM HORMONE: CPT

## 2023-09-05 PROCEDURE — 80053 COMPREHEN METABOLIC PANEL: CPT

## 2023-09-05 PROCEDURE — 1159F MED LIST DOCD IN RCRD: CPT | Performed by: INTERNAL MEDICINE

## 2023-09-05 PROCEDURE — 99215 OFFICE O/P EST HI 40 MIN: CPT | Performed by: INTERNAL MEDICINE

## 2023-09-05 PROCEDURE — 85027 COMPLETE CBC AUTOMATED: CPT

## 2023-09-05 PROCEDURE — 36415 COLL VENOUS BLD VENIPUNCTURE: CPT

## 2023-09-05 PROCEDURE — 1160F RVW MEDS BY RX/DR IN RCRD: CPT | Performed by: INTERNAL MEDICINE

## 2023-09-05 RX ORDER — AMOXICILLIN AND CLAVULANATE POTASSIUM 875; 125 MG/1; MG/1
875 TABLET, FILM COATED ORAL 2 TIMES DAILY
Qty: 20 TABLET | Refills: 0 | Status: SHIPPED | OUTPATIENT
Start: 2023-09-05 | End: 2023-09-15

## 2023-09-05 RX ORDER — BUPROPION HYDROCHLORIDE 150 MG/1
150 TABLET ORAL EVERY MORNING
Qty: 30 TABLET | Refills: 11 | Status: SHIPPED | OUTPATIENT
Start: 2023-09-05 | End: 2024-03-11 | Stop reason: SDUPTHER

## 2023-09-05 NOTE — PROGRESS NOTES
Patient was identified as a fall risk. Risk prevention instructions provided.  Patient was identified as a fall risk. Risk prevention instructions provided.

## 2023-09-05 NOTE — PATIENT INSTRUCTIONS

## 2023-09-28 DIAGNOSIS — L29.9 ITCHING: ICD-10-CM

## 2023-09-29 RX ORDER — HYDROXYZINE HYDROCHLORIDE 25 MG/1
25 TABLET, FILM COATED ORAL 2 TIMES DAILY
Qty: 90 TABLET | Refills: 0 | Status: SHIPPED | OUTPATIENT
Start: 2023-09-29 | End: 2023-11-20

## 2023-10-12 ENCOUNTER — TELEPHONE (OUTPATIENT)
Dept: PRIMARY CARE | Facility: CLINIC | Age: 78
End: 2023-10-12
Payer: MEDICARE

## 2023-10-12 NOTE — TELEPHONE ENCOUNTER
She had an accident and gouged her leg real bad, she needs woundcare, there is no availabilty for her to be seen, everytone is full. What do you want to do?

## 2023-10-12 NOTE — TELEPHONE ENCOUNTER
I did tell her to go to urgent care or emergency room, I also gave her the number to wound care, she says she does not think its infected but will go.

## 2023-10-13 ENCOUNTER — OFFICE VISIT (OUTPATIENT)
Dept: WOUND CARE | Facility: CLINIC | Age: 78
End: 2023-10-13
Payer: MEDICARE

## 2023-10-13 PROCEDURE — 99213 OFFICE O/P EST LOW 20 MIN: CPT | Performed by: INTERNAL MEDICINE

## 2023-10-13 PROCEDURE — 1160F RVW MEDS BY RX/DR IN RCRD: CPT | Performed by: INTERNAL MEDICINE

## 2023-10-13 PROCEDURE — 1159F MED LIST DOCD IN RCRD: CPT | Performed by: INTERNAL MEDICINE

## 2023-10-13 PROCEDURE — 11042 DBRDMT SUBQ TIS 1ST 20SQCM/<: CPT | Performed by: INTERNAL MEDICINE

## 2023-10-13 PROCEDURE — 1036F TOBACCO NON-USER: CPT | Performed by: INTERNAL MEDICINE

## 2023-10-13 PROCEDURE — 99213 OFFICE O/P EST LOW 20 MIN: CPT

## 2023-10-13 PROCEDURE — 11042 DBRDMT SUBQ TIS 1ST 20SQCM/<: CPT

## 2023-10-20 ENCOUNTER — OFFICE VISIT (OUTPATIENT)
Dept: WOUND CARE | Facility: CLINIC | Age: 78
End: 2023-10-20
Payer: MEDICARE

## 2023-10-20 ENCOUNTER — LAB REQUISITION (OUTPATIENT)
Dept: LAB | Facility: HOSPITAL | Age: 78
End: 2023-10-20
Payer: MEDICARE

## 2023-10-20 DIAGNOSIS — S81.812A LACERATION WITHOUT FOREIGN BODY, LEFT LOWER LEG, INITIAL ENCOUNTER: ICD-10-CM

## 2023-10-20 DIAGNOSIS — I73.9 PERIPHERAL VASCULAR DISEASE, UNSPECIFIED (CMS-HCC): ICD-10-CM

## 2023-10-20 PROCEDURE — 87075 CULTR BACTERIA EXCEPT BLOOD: CPT

## 2023-10-20 PROCEDURE — 87181 SC STD AGAR DILUTION PER AGT: CPT

## 2023-10-20 PROCEDURE — 1160F RVW MEDS BY RX/DR IN RCRD: CPT | Performed by: INTERNAL MEDICINE

## 2023-10-20 PROCEDURE — 87070 CULTURE OTHR SPECIMN AEROBIC: CPT

## 2023-10-20 PROCEDURE — 87186 SC STD MICRODIL/AGAR DIL: CPT

## 2023-10-20 PROCEDURE — 99212 OFFICE O/P EST SF 10 MIN: CPT | Performed by: INTERNAL MEDICINE

## 2023-10-20 PROCEDURE — 99212 OFFICE O/P EST SF 10 MIN: CPT

## 2023-10-20 PROCEDURE — 1036F TOBACCO NON-USER: CPT | Performed by: INTERNAL MEDICINE

## 2023-10-20 PROCEDURE — 1159F MED LIST DOCD IN RCRD: CPT | Performed by: INTERNAL MEDICINE

## 2023-10-20 PROCEDURE — 87185 SC STD ENZYME DETCJ PER NZM: CPT

## 2023-10-24 LAB
B-LACTAMASE ORGANISM ISLT: POSITIVE
BACTERIA SPEC CULT: ABNORMAL
CARBA5 NEG: ABNORMAL
GRAM STN SPEC: ABNORMAL
GRAM STN SPEC: ABNORMAL

## 2023-10-27 ENCOUNTER — APPOINTMENT (OUTPATIENT)
Dept: WOUND CARE | Facility: CLINIC | Age: 78
End: 2023-10-27
Payer: MEDICARE

## 2023-10-30 DIAGNOSIS — I48.0 PAROXYSMAL ATRIAL FIBRILLATION (MULTI): ICD-10-CM

## 2023-10-30 RX ORDER — AMIODARONE HYDROCHLORIDE 200 MG/1
TABLET ORAL
Qty: 90 TABLET | Refills: 3 | Status: SHIPPED | OUTPATIENT
Start: 2023-10-30

## 2023-10-31 ENCOUNTER — OFFICE VISIT (OUTPATIENT)
Dept: WOUND CARE | Facility: CLINIC | Age: 78
End: 2023-10-31
Payer: MEDICARE

## 2023-10-31 PROCEDURE — 11042 DBRDMT SUBQ TIS 1ST 20SQCM/<: CPT

## 2023-11-07 ENCOUNTER — APPOINTMENT (OUTPATIENT)
Dept: WOUND CARE | Facility: CLINIC | Age: 78
End: 2023-11-07
Payer: MEDICARE

## 2023-11-10 ENCOUNTER — ANCILLARY PROCEDURE (OUTPATIENT)
Dept: RADIOLOGY | Facility: CLINIC | Age: 78
End: 2023-11-10
Payer: MEDICARE

## 2023-11-10 DIAGNOSIS — C34.91 MALIGNANT NEOPLASM OF UNSPECIFIED PART OF RIGHT BRONCHUS OR LUNG (MULTI): ICD-10-CM

## 2023-11-10 DIAGNOSIS — C34.2 MALIGNANT NEOPLASM OF MIDDLE LOBE, BRONCHUS OR LUNG (MULTI): ICD-10-CM

## 2023-11-10 PROCEDURE — 71250 CT THORAX DX C-: CPT | Performed by: RADIOLOGY

## 2023-11-10 PROCEDURE — 71250 CT THORAX DX C-: CPT

## 2023-11-14 ENCOUNTER — APPOINTMENT (OUTPATIENT)
Dept: RADIATION ONCOLOGY | Facility: CLINIC | Age: 78
End: 2023-11-14
Payer: MEDICARE

## 2023-11-14 ENCOUNTER — HOSPITAL ENCOUNTER (OUTPATIENT)
Dept: RADIATION ONCOLOGY | Facility: CLINIC | Age: 78
Setting detail: RADIATION/ONCOLOGY SERIES
Discharge: HOME | End: 2023-11-14
Payer: MEDICARE

## 2023-11-14 DIAGNOSIS — C34.2 LUNG CANCER, MIDDLE LOBE (MULTI): Primary | ICD-10-CM

## 2023-11-14 PROCEDURE — 99212 OFFICE O/P EST SF 10 MIN: CPT | Performed by: RADIOLOGY

## 2023-11-14 NOTE — PROGRESS NOTES
Shannan Tee is a 78 y.o. female on day 0 of admission presenting with 3.4 cm SCCA RML Nodes clinically neg not interested in surgery.    Subjective   Shannan completed definitive SBRT to the MRL 50 GY 5 fx on 2/3/23. She has stable pulmonary function but currently has a cold. CT for followup was done 11/10/23.  Reviewing results today   FINDINGS:  LUNGS AND AIRWAYS:  There is debris/secretions within the trachea and bilateral mainstem  bronchi. There is diffuse bronchial wall thickening.      There is similar size of a consolidative airspace opacity within the  medial right middle lobe given for differences in  inspiration/expiration when compared to prior exam dated 02/26/2023.  This measures 3.3 x 1.6 cm on current exam (series 202, image 227,  previously measuring 3.1 x 2.2 cm on exam dated 02/26/2023. There are  surrounding bandlike opacities with associated traction  bronchiectasis and volume loss compatible with postradiation fibrosis.      Interval resolution of previously visualized small bilateral pleural  effusions with associated passive atelectasis. Additionally, interval  resolution of previously visualized diffuse interlobular septal  thickening and associated ground-glass opacity seen on prior exam  dated 02/26/2023. No focal consolidation, pleural effusion, or  pneumothorax on current exam. Moderate upper lobe predominant  emphysematous changes are again noted.      Unchanged size of a 0.4 cm spiculated nodule within the right upper  lobe (series 205, image 155). No new or enlarging discrete suspicious  pulmonary nodules.      MEDIASTINUM AND ADY, LOWER NECK AND AXILLA:  The visualized thyroid gland is within normal limits.      Interval decrease in size of a subcarinal lymph node measuring up to  1.2 cm in short axis, previously measuring up to 1.9 cm. Interval  decrease in size of a right lower paratracheal lymph node measuring  up to 0.7 cm in short axis, previously measuring up to 1.6  cm.  Interval decrease in size of a 0.8 cm subaortic lymph node,  previously measuring 1.4 cm. Interval decrease in size of a 0.6 cm  right upper paratracheal lymph node, previously measuring 0.9 cm.      Esophagus appears within normal limits as seen.      HEART AND VESSELS:  The ascending thoracic aorta is normal in caliber. Unchanged  aneurysmal dilatation of the distal descending thoracic aorta  measuring up to 4.6 cm in transverse diameter. There are again  moderate to severe diffuse atherosclerotic calcifications of the  thoracic aorta and its branching vessels.      Main pulmonary artery and its branches are normal in caliber.      Severe coronary artery calcifications are seen. The study is not  optimized for evaluation of coronary arteries.      Similar appearance of biatrial cardiac enlargement. Stable  positioning of a left chest wall AICD/pacemaker with leads  terminating in the right atrium and right ventricle.      No evidence of pericardial effusion.      UPPER ABDOMEN:  Stable size of a 3.7 cm simple fluid attenuating cysts within the  right superior renal pole. Stable 1.9 cm simple fluid attenuating  cysts within the left superior renal pole. Otherwise, the partially  visualized subdiaphragmatic structures are unremarkable in appearance.      CHEST WALL AND OSSEOUS STRUCTURES:  The chest wall soft tissues are unremarkable. No acute osseous  abnormalities or suspicious osseous lesions. Unchanged discogenic  degenerative changes of the thoracic spine.      IMPRESSION:  1. Similar size of a consolidative airspace opacity within the medial  right middle lobe given for differences in inspiration/expiration  when compared to exam dated 02/26/2023 and as described above.  Surrounding postradiation fibrosis is again noted.  2. Interval improvement of thoracic lymphadenopathy as described  above.  3. Interval resolution of previously visualized small bilateral  pleural effusions with associated passive  atelectasis.  4. Interval resolution of previously visualized diffuse interlobular  septal thickening and associated ground-glass opacities seen on exam  dated 02/26/2023. No acute cardiopulmonary process on current exam.  5. Stable size of a 0.4 cm spiculated nodule within the right upper  lobe. No new or enlarging discrete suspicious pulmonary nodules.  6. Remaining chronic and incidental findings are stable as described  above.      Assessment/Plan   S/p definitive SBRT to RML 6 months ago with interval improvement in imaging findings. Continue with pulmonology and recommend follow up CT in 6 months. No contrast requested    I spent 10 minutes in the professional and overall care of this patient.      Nicolasa Lundy MD

## 2023-11-15 ENCOUNTER — APPOINTMENT (OUTPATIENT)
Dept: HEMATOLOGY/ONCOLOGY | Facility: CLINIC | Age: 78
End: 2023-11-15
Payer: MEDICARE

## 2023-11-15 ENCOUNTER — TELEPHONE (OUTPATIENT)
Dept: HEMATOLOGY/ONCOLOGY | Facility: CLINIC | Age: 78
End: 2023-11-15
Payer: MEDICARE

## 2023-11-15 DIAGNOSIS — C34.31 MALIGNANT NEOPLASM OF LOWER LOBE OF RIGHT LUNG (MULTI): Primary | ICD-10-CM

## 2023-11-16 ENCOUNTER — LAB (OUTPATIENT)
Dept: LAB | Facility: CLINIC | Age: 78
End: 2023-11-16
Payer: MEDICARE

## 2023-11-16 DIAGNOSIS — C34.2 MALIGNANT NEOPLASM OF MIDDLE LOBE OF RIGHT LUNG (MULTI): ICD-10-CM

## 2023-11-16 LAB
ALBUMIN SERPL BCP-MCNC: 3.9 G/DL (ref 3.4–5)
ALP SERPL-CCNC: 137 U/L (ref 33–136)
ALT SERPL W P-5'-P-CCNC: 25 U/L (ref 7–45)
ANION GAP SERPL CALC-SCNC: 10 MMOL/L (ref 10–20)
AST SERPL W P-5'-P-CCNC: 22 U/L (ref 9–39)
BASOPHILS # BLD AUTO: 0.04 X10*3/UL (ref 0–0.1)
BASOPHILS NFR BLD AUTO: 0.5 %
BILIRUB SERPL-MCNC: 0.7 MG/DL (ref 0–1.2)
BUN SERPL-MCNC: 24 MG/DL (ref 6–23)
CALCIUM SERPL-MCNC: 9.4 MG/DL (ref 8.6–10.3)
CEA SERPL-MCNC: 4.5 UG/L
CHLORIDE SERPL-SCNC: 101 MMOL/L (ref 98–107)
CO2 SERPL-SCNC: 30 MMOL/L (ref 21–32)
CREAT SERPL-MCNC: 1.46 MG/DL (ref 0.5–1.05)
EOSINOPHIL # BLD AUTO: 0.18 X10*3/UL (ref 0–0.4)
EOSINOPHIL NFR BLD AUTO: 2.3 %
ERYTHROCYTE [DISTWIDTH] IN BLOOD BY AUTOMATED COUNT: 14.1 % (ref 11.5–14.5)
GFR SERPL CREATININE-BSD FRML MDRD: 37 ML/MIN/1.73M*2
GLUCOSE SERPL-MCNC: 81 MG/DL (ref 74–99)
HCT VFR BLD AUTO: 42.3 % (ref 36–46)
HGB BLD-MCNC: 14 G/DL (ref 12–16)
IMM GRANULOCYTES # BLD AUTO: 0.01 X10*3/UL (ref 0–0.5)
IMM GRANULOCYTES NFR BLD AUTO: 0.1 % (ref 0–0.9)
LYMPHOCYTES # BLD AUTO: 1.91 X10*3/UL (ref 0.8–3)
LYMPHOCYTES NFR BLD AUTO: 24.6 %
MCH RBC QN AUTO: 33.2 PG (ref 26–34)
MCHC RBC AUTO-ENTMCNC: 33.1 G/DL (ref 32–36)
MCV RBC AUTO: 100 FL (ref 80–100)
MONOCYTES # BLD AUTO: 1.05 X10*3/UL (ref 0.05–0.8)
MONOCYTES NFR BLD AUTO: 13.5 %
NEUTROPHILS # BLD AUTO: 4.59 X10*3/UL (ref 1.6–5.5)
NEUTROPHILS NFR BLD AUTO: 59 %
PLATELET # BLD AUTO: 162 X10*3/UL (ref 150–450)
POTASSIUM SERPL-SCNC: 4.6 MMOL/L (ref 3.5–5.3)
PROT SERPL-MCNC: 8 G/DL (ref 6.4–8.2)
RBC # BLD AUTO: 4.22 X10*6/UL (ref 4–5.2)
SODIUM SERPL-SCNC: 136 MMOL/L (ref 136–145)
WBC # BLD AUTO: 7.8 X10*3/UL (ref 4.4–11.3)

## 2023-11-16 PROCEDURE — 82378 CARCINOEMBRYONIC ANTIGEN: CPT

## 2023-11-16 PROCEDURE — 80053 COMPREHEN METABOLIC PANEL: CPT

## 2023-11-16 PROCEDURE — 85025 COMPLETE CBC W/AUTO DIFF WBC: CPT

## 2023-11-16 PROCEDURE — 36415 COLL VENOUS BLD VENIPUNCTURE: CPT

## 2023-11-19 NOTE — TELEPHONE ENCOUNTER
Spoke with Suzan at pharmacy and they have the atarax ready to fill. It was pending for PA however she was able to use discount card to bring price down to $18. Suzan will call patient to let her know   [facility]

## 2023-11-20 DIAGNOSIS — L29.9 ITCHING: ICD-10-CM

## 2023-11-20 RX ORDER — HYDROXYZINE HYDROCHLORIDE 25 MG/1
25 TABLET, FILM COATED ORAL 2 TIMES DAILY
Qty: 90 TABLET | Refills: 0 | Status: SHIPPED | OUTPATIENT
Start: 2023-11-20 | End: 2024-01-14

## 2023-11-28 ENCOUNTER — OFFICE VISIT (OUTPATIENT)
Dept: HEMATOLOGY/ONCOLOGY | Facility: CLINIC | Age: 78
End: 2023-11-28
Payer: MEDICARE

## 2023-11-28 DIAGNOSIS — I10 BENIGN ESSENTIAL HYPERTENSION: ICD-10-CM

## 2023-11-28 DIAGNOSIS — I48.91 ATRIAL FIBRILLATION WITH RVR (MULTI): ICD-10-CM

## 2023-11-28 DIAGNOSIS — C34.31 MALIGNANT NEOPLASM OF LOWER LOBE OF RIGHT LUNG (MULTI): ICD-10-CM

## 2023-11-28 DIAGNOSIS — J43.9 PULMONARY EMPHYSEMA, UNSPECIFIED EMPHYSEMA TYPE (MULTI): ICD-10-CM

## 2023-11-28 DIAGNOSIS — J20.9 ACUTE BRONCHITIS, UNSPECIFIED ORGANISM: Primary | ICD-10-CM

## 2023-11-28 DIAGNOSIS — C34.2 MALIGNANT NEOPLASM OF MIDDLE LOBE OF RIGHT LUNG (MULTI): Primary | ICD-10-CM

## 2023-11-28 PROCEDURE — 99442 PR PHYS/QHP TELEPHONE EVALUATION 11-20 MIN: CPT | Performed by: INTERNAL MEDICINE

## 2023-11-28 PROCEDURE — 1160F RVW MEDS BY RX/DR IN RCRD: CPT | Performed by: INTERNAL MEDICINE

## 2023-11-28 PROCEDURE — 1036F TOBACCO NON-USER: CPT | Performed by: INTERNAL MEDICINE

## 2023-11-28 PROCEDURE — 1159F MED LIST DOCD IN RCRD: CPT | Performed by: INTERNAL MEDICINE

## 2023-11-28 RX ORDER — BENZONATATE 200 MG/1
200 CAPSULE ORAL 3 TIMES DAILY PRN
COMMUNITY
End: 2023-11-30 | Stop reason: ALTCHOICE

## 2023-11-28 RX ORDER — BENZONATATE 200 MG/1
200 CAPSULE ORAL 3 TIMES DAILY PRN
Qty: 20 CAPSULE | Refills: 0 | OUTPATIENT
Start: 2023-11-28

## 2023-11-28 NOTE — PATIENT INSTRUCTIONS
Dr Lundy scheduled your next CT scan already for 5/24/2023    I will see you for followup (and labs) after the CT is done

## 2023-11-28 NOTE — PROGRESS NOTES
Patient ID: Shannan Tee is a 78 y.o. female.  Referring Physician: Mango Gilbert MD  [address]  [place] [address]  Primary Care Provider: Sunshine Warren MD  Visit Type:  Follow Up     Verbal consent was requested and obtained from patient on this date for a telehealth visit.    Subjective    HPI How was my CT scan?    Review of Systems   Constitutional:  Positive for fatigue.   HENT:  Negative.     Eyes: Negative.    Respiratory:  Positive for cough and wheezing.    Cardiovascular: Negative.    Gastrointestinal: Negative.    Endocrine: Negative.    Genitourinary: Negative.     Musculoskeletal: Negative.    Skin: Negative.    Neurological: Negative.    Hematological: Negative.    Psychiatric/Behavioral: Negative.          Objective   BSA: There is no height or weight on file to calculate BSA.  There were no vitals taken for this visit.     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/08/2021), Pain in unspecified ankle and joints of unspecified foot (08/29/2020), Personal history of other drug therapy, Personal history of other endocrine, nutritional and metabolic disease (08/18/2022), Personal history of other infectious and parasitic diseases (01/29/2018), Personal history of other specified conditions (09/04/2020), Radiculopathy, lumbar region (12/08/2021), Radiculopathy, lumbar region (07/02/2021), Tobacco abuse counseling (05/06/2022), Transient cerebral ischemic attack, unspecified, Unspecified mononeuropathy of unspecified lower limb (10/01/2021), and Urinary tract infection, site not specified (08/31/2020).   has a past surgical history that includes Other surgical history (11/22/2022) and CT angio aorta and bilateral iliofemoral runoff w and or wo IV contrast  "(3/8/2023).  Family History   Problem Relation Name Age of Onset    Hypertension Mother      Diabetes Mother      Stroke Father       Oncology History    No history exists.       Edda Tee \"Shannan\"  reports that she has quit smoking. Her smoking use included cigarettes. She has never used smokeless tobacco.  She  reports no history of alcohol use.  She  reports no history of drug use.    Physical Exam    WBC   Date/Time Value Ref Range Status   11/16/2023 09:35 AM 7.8 4.4 - 11.3 x10*3/uL Final   09/05/2023 11:50 AM 9.3 4.4 - 11.3 x10E9/L Final   04/20/2023 05:42 AM 6.7 4.4 - 11.3 x10E9/L Final   04/19/2023 06:37 AM 6.1 4.4 - 11.3 x10E9/L Final     nRBC   Date Value Ref Range Status   04/20/2023 0.0 0.0 - 0.0 /100 WBC Final   04/19/2023 0.0 0.0 - 0.0 /100 WBC Final   04/18/2023 0.0 0.0 - 0.0 /100 WBC Final     RBC   Date Value Ref Range Status   11/16/2023 4.22 4.00 - 5.20 x10*6/uL Final   09/05/2023 3.94 (L) 4.00 - 5.20 x10E12/L Final   04/20/2023 3.66 (L) 4.00 - 5.20 x10E12/L Final   04/19/2023 3.38 (L) 4.00 - 5.20 x10E12/L Final     Hemoglobin   Date Value Ref Range Status   11/16/2023 14.0 12.0 - 16.0 g/dL Final   09/05/2023 12.9 12.0 - 16.0 g/dL Final   04/20/2023 12.1 12.0 - 16.0 g/dL Final   04/19/2023 10.9 (L) 12.0 - 16.0 g/dL Final     Hematocrit   Date Value Ref Range Status   11/16/2023 42.3 36.0 - 46.0 % Final   09/05/2023 40.0 36.0 - 46.0 % Final   04/20/2023 38.2 36.0 - 46.0 % Final   04/19/2023 34.9 (L) 36.0 - 46.0 % Final     MCV   Date/Time Value Ref Range Status   11/16/2023 09:35  80 - 100 fL Final   09/05/2023 11:50  (H) 80 - 100 fL Final   04/20/2023 05:42  (H) 80 - 100 fL Final   04/19/2023 06:37  (H) 80 - 100 fL Final     MCH   Date/Time Value Ref Range Status   11/16/2023 09:35 AM 33.2 26.0 - 34.0 pg Final     MCHC   Date/Time Value Ref Range Status   11/16/2023 09:35 AM 33.1 32.0 - 36.0 g/dL Final   09/05/2023 11:50 AM 32.3 32.0 - 36.0 g/dL Final   04/20/2023 05:42 " "AM 31.7 (L) 32.0 - 36.0 g/dL Final   04/19/2023 06:37 AM 31.2 (L) 32.0 - 36.0 g/dL Final     RDW   Date/Time Value Ref Range Status   11/16/2023 09:35 AM 14.1 11.5 - 14.5 % Final   09/05/2023 11:50 AM 15.4 (H) 11.5 - 14.5 % Final   04/20/2023 05:42 AM 17.4 (H) 11.5 - 14.5 % Final   04/19/2023 06:37 AM 17.5 (H) 11.5 - 14.5 % Final     Platelets   Date/Time Value Ref Range Status   11/16/2023 09:35  150 - 450 x10*3/uL Final   09/05/2023 11:50  150 - 450 x10E9/L Final   04/20/2023 05:42  150 - 450 x10E9/L Final   04/19/2023 06:37  150 - 450 x10E9/L Final     No results found for: \"MPV\"  Neutrophils %   Date/Time Value Ref Range Status   11/16/2023 09:35 AM 59.0 40.0 - 80.0 % Final   04/17/2023 11:57 AM 77.3 40.0 - 80.0 % Final   03/13/2023 10:40 AM 72.0 40.0 - 80.0 % Final   02/26/2023 02:35 AM 67.9 40.0 - 80.0 % Final     Immature Granulocytes %, Automated   Date/Time Value Ref Range Status   11/16/2023 09:35 AM 0.1 0.0 - 0.9 % Final     Comment:     Immature Granulocyte Count (IG) includes promyelocytes, myelocytes and metamyelocytes but does not include bands. Percent differential counts (%) should be interpreted in the context of the absolute cell counts (cells/UL).   04/17/2023 11:57 AM 0.3 0.0 - 0.9 % Final     Comment:      Immature Granulocyte Count (IG) includes promyelocytes,    myelocytes and metamyelocytes but does not include bands.   Percent differential counts (%) should be interpreted in the   context of the absolute cell counts (cells/L).     03/13/2023 10:40 AM 0.3 0.0 - 0.9 % Final     Comment:      Immature Granulocyte Count (IG) includes promyelocytes,    myelocytes and metamyelocytes but does not include bands.   Percent differential counts (%) should be interpreted in the   context of the absolute cell counts (cells/L).     02/26/2023 02:35 AM 0.4 0.0 - 0.9 % Final     Comment:      Immature Granulocyte Count (IG) includes promyelocytes,    myelocytes and metamyelocytes " but does not include bands.   Percent differential counts (%) should be interpreted in the   context of the absolute cell counts (cells/L).       Lymphocytes %   Date/Time Value Ref Range Status   11/16/2023 09:35 AM 24.6 13.0 - 44.0 % Final   04/17/2023 11:57 AM 10.5 13.0 - 44.0 % Final   03/13/2023 10:40 AM 16.8 13.0 - 44.0 % Final   02/26/2023 02:35 AM 20.3 13.0 - 44.0 % Final     Monocytes %   Date/Time Value Ref Range Status   11/16/2023 09:35 AM 13.5 2.0 - 10.0 % Final   04/17/2023 11:57 AM 10.7 2.0 - 10.0 % Final   03/13/2023 10:40 AM 9.6 2.0 - 10.0 % Final   02/26/2023 02:35 AM 9.7 2.0 - 10.0 % Final     Eosinophils %   Date/Time Value Ref Range Status   11/16/2023 09:35 AM 2.3 0.0 - 6.0 % Final   04/17/2023 11:57 AM 0.8 0.0 - 6.0 % Final   03/13/2023 10:40 AM 1.0 0.0 - 6.0 % Final   02/26/2023 02:35 AM 1.1 0.0 - 6.0 % Final     Basophils %   Date/Time Value Ref Range Status   11/16/2023 09:35 AM 0.5 0.0 - 2.0 % Final   04/17/2023 11:57 AM 0.4 0.0 - 2.0 % Final   03/13/2023 10:40 AM 0.3 0.0 - 2.0 % Final   02/26/2023 02:35 AM 0.6 0.0 - 2.0 % Final     Neutrophils Absolute   Date/Time Value Ref Range Status   11/16/2023 09:35 AM 4.59 1.60 - 5.50 x10*3/uL Final     Comment:     Percent differential counts (%) should be interpreted in the context of the absolute cell counts (cells/uL).   04/17/2023 11:57 AM 5.77 (H) 1.60 - 5.50 x10E9/L Final   03/13/2023 10:40 AM 5.02 1.60 - 5.50 x10E9/L Final   02/26/2023 02:35 AM 5.67 (H) 1.60 - 5.50 x10E9/L Final     Immature Granulocytes Absolute, Automated   Date/Time Value Ref Range Status   11/16/2023 09:35 AM 0.01 0.00 - 0.50 x10*3/uL Final     Lymphocytes Absolute   Date/Time Value Ref Range Status   11/16/2023 09:35 AM 1.91 0.80 - 3.00 x10*3/uL Final   04/17/2023 11:57 AM 0.78 (L) 0.80 - 3.00 x10E9/L Final   03/13/2023 10:40 AM 1.17 0.80 - 3.00 x10E9/L Final   02/26/2023 02:35 AM 1.69 0.80 - 3.00 x10E9/L Final     Monocytes Absolute   Date/Time Value Ref Range Status  "  11/16/2023 09:35 AM 1.05 (H) 0.05 - 0.80 x10*3/uL Final   04/17/2023 11:57 AM 0.80 0.05 - 0.80 x10E9/L Final   03/13/2023 10:40 AM 0.67 0.05 - 0.80 x10E9/L Final   02/26/2023 02:35 AM 0.81 (H) 0.05 - 0.80 x10E9/L Final     Eosinophils Absolute   Date/Time Value Ref Range Status   11/16/2023 09:35 AM 0.18 0.00 - 0.40 x10*3/uL Final   04/17/2023 11:57 AM 0.06 0.00 - 0.40 x10E9/L Final   03/13/2023 10:40 AM 0.07 0.00 - 0.40 x10E9/L Final   02/26/2023 02:35 AM 0.09 0.00 - 0.40 x10E9/L Final     Basophils Absolute   Date/Time Value Ref Range Status   11/16/2023 09:35 AM 0.04 0.00 - 0.10 x10*3/uL Final   04/17/2023 11:57 AM 0.03 0.00 - 0.10 x10E9/L Final   03/13/2023 10:40 AM 0.02 0.00 - 0.10 x10E9/L Final   02/26/2023 02:35 AM 0.05 0.00 - 0.10 x10E9/L Final       No components found for: \"PT\"  aPTT   Date/Time Value Ref Range Status   03/30/2023 09:19 PM 28 26 - 39 sec Final     Comment:       THE APTT IS NO LONGER USED FOR MONITORING     UNFRACTIONATED HEPARIN THERAPY.    FOR MONITORING HEPARIN THERAPY,     USE THE HEPARIN ASSAY.     11/13/2022 02:30 PM 32 26 - 39 sec Final     Comment:       THE APTT IS NO LONGER USED FOR MONITORING     UNFRACTIONATED HEPARIN THERAPY.    FOR MONITORING HEPARIN THERAPY,     USE THE HEPARIN ASSAY.       Medication Documentation Review Audit       Reviewed by Nicolasa Lundy MD (Physician) on 11/14/23 at 0924      Medication Order Taking? Sig Documenting Provider Last Dose Status   amiodarone (Pacerone) 200 mg tablet [id number]  TAKE ONE TABLET BY MOUTH EVERY DAY Sunshine Warren MD  Active   apixaban (Eliquis) 5 mg tablet [id number]  Take 0.5 tablets (2.5 mg) by mouth 2 times a day. Sunshine Warren MD  Active   buPROPion XL (Wellbutrin XL) 150 mg 24 hr tablet [id number]  Take 1 tablet (150 mg) by mouth once daily in the morning. Do not crush, chew, or split. Sunshine Warren MD  Active   carvedilol (Coreg) 6.25 mg tablet [id number]  Take 0.5 tablets (3.125 mg) by mouth 2 times a day " with meals. Historical Provider, MD  Active   furosemide (Lasix) 40 mg tablet [id number]  Take 1 tablet (40 mg) by mouth once daily. Historical Provider, MD  Active   hydrOXYzine HCL (Atarax) 25 mg tablet [id number]  Take 1 tablet (25 mg) by mouth 2 times a day. Radha Wise MD  Active   MAGNESIUM GLUCONATE ORAL [id number]  Take 1 tablet by mouth once daily. 500 MG Oral Tablet Historical Provider, MD  Active   spironolactone (Aldactone) 25 mg tablet [id number]  Take 1 tablet (25 mg) by mouth once daily. Sunshine Warren MD  Active   vit C/E/Zn/coppr/lutein/zeaxan (PRESERVISION AREDS-2 ORAL) [id number]  Take 1 capsule by mouth once daily. Historical Provider, MD  Active   vitamin E 180 mg (400 unit) capsule [id number]  Take 1 capsule (400 Units) by mouth once daily. Historical Provider, MD  Active                     Assessment/Plan    1) NSCLC  -here for interval followup  -brain MRI was negative  -PET scan showed no extrathoracic disease  -reviewed in thoracic tumor bd last week--pt was recommended surgery vs SBRT vs  [place] [id number] study  -pt declined surgery  -[facility] -4 study entails SBRT followed by 1 year of immunotherapy, [place] [facility] [place] [facility] [place]  -treated with SBRT (5 treatments) from 1/30 to 2/3/2022  -5/8/2023 PET showed interval decrease in size of RML mass with mild residual activity  -11/10/2023 chest CT showed interval decrease in size of subcarinal node 1.2 cm, interval decrease in size of right lower paratracheal node 0.7 cm; similar size of consolidative airspace opacity within RML 3.3 x 1.6 cm  with surrounding bandlike opacities with associated traction bronchiectasis and volume loss compatible with postradiation fibrosis  -rad onc has booked her next chest CT for 5/14/2024  -labs done on 11/16/2023 include CBC, COMP and CEA  -still trying to get over an acute bronchitis  -results reviewed; wbc 7.8, hgb 14, plt 162,000, creatinine 1.46,  AST 22, ALT 25, CEA 4.5  -will see her  again in 6 months, after next CT is done       2) hypertension  -on lasix  -on coreg  -on lisinopril     3) COPD  -on spiriva  -quit smoking in 11/2022     4) atrial fibrillation  -on eliquis    -on amiodarone       Problem List Items Addressed This Visit             ICD-10-CM    Lung cancer (CMS/HCC) - Primary C34.90    Relevant Orders    Clinic Appointment Request [facility]; [id number]    CBC and Auto Differential    Comprehensive metabolic panel    CEA            Mango Gilbert MD

## 2023-11-30 ENCOUNTER — APPOINTMENT (OUTPATIENT)
Dept: PRIMARY CARE | Facility: CLINIC | Age: 78
End: 2023-11-30
Payer: MEDICARE

## 2023-11-30 DIAGNOSIS — R05.9 COUGH, UNSPECIFIED TYPE: Primary | ICD-10-CM

## 2023-11-30 RX ORDER — BENZONATATE 100 MG/1
100 CAPSULE ORAL 3 TIMES DAILY PRN
Qty: 42 CAPSULE | Refills: 0 | Status: SHIPPED | OUTPATIENT
Start: 2023-11-30 | End: 2023-12-30

## 2023-11-30 NOTE — TELEPHONE ENCOUNTER
She called the other day, she wondered if tessalon pearls cough medicine could be sent to her pharmacy, her cough is not new, she still has the cough since we last saw her, please advise if it can be filled.

## 2023-12-02 ASSESSMENT — ENCOUNTER SYMPTOMS
GASTROINTESTINAL NEGATIVE: 1
COUGH: 1
HEMATOLOGIC/LYMPHATIC NEGATIVE: 1
FATIGUE: 1
ENDOCRINE NEGATIVE: 1
EYES NEGATIVE: 1
CARDIOVASCULAR NEGATIVE: 1
MUSCULOSKELETAL NEGATIVE: 1
NEUROLOGICAL NEGATIVE: 1
PSYCHIATRIC NEGATIVE: 1
WHEEZING: 1

## 2023-12-06 DIAGNOSIS — I48.91 ATRIAL FIBRILLATION WITH RVR (MULTI): ICD-10-CM

## 2023-12-06 RX ORDER — APIXABAN 5 MG/1
TABLET, FILM COATED ORAL
Qty: 30 TABLET | Refills: 0 | Status: SHIPPED | OUTPATIENT
Start: 2023-12-06 | End: 2024-01-14

## 2024-01-14 DIAGNOSIS — L29.9 ITCHING: ICD-10-CM

## 2024-01-14 DIAGNOSIS — I48.91 ATRIAL FIBRILLATION WITH RVR (MULTI): ICD-10-CM

## 2024-01-14 RX ORDER — APIXABAN 5 MG/1
TABLET, FILM COATED ORAL 2 TIMES DAILY
Qty: 30 TABLET | Refills: 0 | Status: SHIPPED | OUTPATIENT
Start: 2024-01-14 | End: 2024-02-17

## 2024-01-14 RX ORDER — HYDROXYZINE HYDROCHLORIDE 25 MG/1
25 TABLET, FILM COATED ORAL 2 TIMES DAILY
Qty: 90 TABLET | Refills: 0 | Status: SHIPPED | OUTPATIENT
Start: 2024-01-14 | End: 2024-03-11

## 2024-01-16 ENCOUNTER — TELEPHONE (OUTPATIENT)
Dept: PRIMARY CARE | Facility: CLINIC | Age: 79
End: 2024-01-16
Payer: MEDICARE

## 2024-01-22 ENCOUNTER — TELEPHONE (OUTPATIENT)
Dept: PRIMARY CARE | Facility: CLINIC | Age: 79
End: 2024-01-22
Payer: MEDICARE

## 2024-01-22 DIAGNOSIS — I48.91 ATRIAL FIBRILLATION WITH RVR (MULTI): Primary | ICD-10-CM

## 2024-01-22 RX ORDER — CARVEDILOL 6.25 MG/1
3.12 TABLET ORAL
Qty: 90 TABLET | Refills: 3 | Status: SHIPPED | OUTPATIENT
Start: 2024-01-22 | End: 2025-01-21

## 2024-01-22 NOTE — TELEPHONE ENCOUNTER
Pt of Dr Warren's. Calling in to request a refill on her Coreg due to her last provider Dr Becerra no longer being here to fill it. Pt believes it is Coreg using [facility] [address] [place] and asking for a call back from [facility]. Please advise. Thank you!

## 2024-02-07 DIAGNOSIS — I42.0 DILATED CARDIOMYOPATHY (MULTI): ICD-10-CM

## 2024-02-07 RX ORDER — SPIRONOLACTONE 25 MG/1
12.5 TABLET ORAL DAILY
Qty: 45 TABLET | Refills: 0 | Status: SHIPPED | OUTPATIENT
Start: 2024-02-07 | End: 2024-05-10

## 2024-02-09 ENCOUNTER — TELEMEDICINE (OUTPATIENT)
Dept: PRIMARY CARE | Facility: CLINIC | Age: 79
End: 2024-02-09
Payer: MEDICARE

## 2024-02-09 DIAGNOSIS — J06.9 URTI (ACUTE UPPER RESPIRATORY INFECTION): Primary | ICD-10-CM

## 2024-02-09 PROCEDURE — 1036F TOBACCO NON-USER: CPT | Performed by: INTERNAL MEDICINE

## 2024-02-09 PROCEDURE — 1159F MED LIST DOCD IN RCRD: CPT | Performed by: INTERNAL MEDICINE

## 2024-02-09 PROCEDURE — 1157F ADVNC CARE PLAN IN RCRD: CPT | Performed by: INTERNAL MEDICINE

## 2024-02-09 PROCEDURE — 99214 OFFICE O/P EST MOD 30 MIN: CPT | Performed by: INTERNAL MEDICINE

## 2024-02-09 RX ORDER — AMOXICILLIN AND CLAVULANATE POTASSIUM 875; 125 MG/1; MG/1
875 TABLET, FILM COATED ORAL 2 TIMES DAILY
Qty: 20 TABLET | Refills: 0 | Status: SHIPPED | OUTPATIENT
Start: 2024-02-09 | End: 2024-02-19

## 2024-02-09 RX ORDER — BENZONATATE 100 MG/1
100 CAPSULE ORAL 3 TIMES DAILY PRN
Qty: 42 CAPSULE | Refills: 0 | Status: SHIPPED | OUTPATIENT
Start: 2024-02-09 | End: 2024-03-10

## 2024-02-09 ASSESSMENT — PATIENT HEALTH QUESTIONNAIRE - PHQ9
2. FEELING DOWN, DEPRESSED OR HOPELESS: NOT AT ALL
1. LITTLE INTEREST OR PLEASURE IN DOING THINGS: NOT AT ALL
SUM OF ALL RESPONSES TO PHQ9 QUESTIONS 1 AND 2: 0

## 2024-02-09 NOTE — PROGRESS NOTES
"Subjective   Patient ID: [id number]     Edda Tee \"Carrie" is a 79 y.o. female who presents for Cough (Onset about 1 week ago. Patient current symptoms include cough, nasal drainage and greenish mucus. Patient has taken Delsym. ).    Current Outpatient Medications:     amiodarone (Pacerone) 200 mg tablet, TAKE ONE TABLET BY MOUTH EVERY DAY, Disp: 90 tablet, Rfl: 3    buPROPion XL (Wellbutrin XL) 150 mg 24 hr tablet, Take 1 tablet (150 mg) by mouth once daily in the morning. Do not crush, chew, or split., Disp: 30 tablet, Rfl: 11    carvedilol (Coreg) 6.25 mg tablet, Take 0.5 tablets (3.125 mg) by mouth 2 times a day with meals., Disp: 90 tablet, Rfl: 3    Eliquis 5 mg tablet, TAKE ONE-HALF TABLET BY MOUTH TWICE DAILY, Disp: 30 tablet, Rfl: 0    furosemide (Lasix) 40 mg tablet, Take 1 tablet (40 mg) by mouth once daily., Disp: , Rfl:     hydrOXYzine HCL (Atarax) 25 mg tablet, TAKE ONE TABLET BY MOUTH TWO TIMES A DAY, Disp: 90 tablet, Rfl: 0    MAGNESIUM GLUCONATE ORAL, Take 1 tablet by mouth once daily. 500 MG Oral Tablet, Disp: , Rfl:     spironolactone (Aldactone) 25 mg tablet, TAKE ONE-HALF TABLET BY MOUTH ONCE DAILY, Disp: 45 tablet, Rfl: 0    vit C/E/Zn/coppr/lutein/zeaxan (PRESERVISION AREDS-2 ORAL), Take 1 capsule by mouth once daily., Disp: , Rfl:     vitamin E 180 mg (400 unit) capsule, Take 1 capsule (400 Units) by mouth once daily., Disp: , Rfl:   HPI  Virtual or Telephone Consent    An interactive audio and video telecommunication system which permits real time communications between the patient (at the originating site) and provider (at the distant site) was utilized to provide this telehealth service.   Verbal consent was requested and obtained from Edda Tee on this date, 02/09/24 for a telehealth visit.   79-year-old female patient participated in this virtual visit for evaluation of respiratory symptoms.  Patient stated that she started feeling poorly over the last week and it is " progressively getting worse and is not improving she does have a cough and sputum production that is green in color not much significant sinus pressure but she does have excessive postnasal drainage no shortness of breath no wheezing no loss of taste loss of smell she does have chills but no documented fever she tested for COVID-19 today and it was negative.  Review of system was reviewed all normal except what is noted in HPI   Past Medical History:   Diagnosis Date    Cellulitis of right lower limb 06/29/2020    Cellulitis of both feet    ERRONEOUS ENCOUNTER--DISREGARD     Low back pain, unspecified 08/18/2022    Acute bilateral low back pain without sciatica    Nicotine dependence, cigarettes, uncomplicated 07/26/2022    Tobacco dependence due to cigarettes    Other vascular myelopathies (CMS/HCC) 12/08/2021    Neurogenic claudication    Pain in unspecified ankle and joints of unspecified foot 08/29/2020    Ankle pain    Personal history of other drug therapy     History of anticoagulant therapy    Personal history of other endocrine, nutritional and metabolic disease 08/18/2022    History of hyperkalemia    Personal history of other infectious and parasitic diseases 01/29/2018    History of herpes labialis    Personal history of other specified conditions 09/04/2020    History of palpitations    Radiculopathy, lumbar region 12/08/2021    Lumbar radiculopathy    Radiculopathy, lumbar region 07/02/2021    Left lumbar radiculopathy    Tobacco abuse counseling 05/06/2022    Tobacco abuse counseling    Transient cerebral ischemic attack, unspecified     TIA on medication    Unspecified mononeuropathy of unspecified lower limb 10/01/2021    Lower extremity neuropathy    Urinary tract infection, site not specified 08/31/2020    Acute UTI      Objective   There were no vitals taken for this visit.     Physical Exam    Assessment/Plan   Problem List Items Addressed This Visit    None  79-year-old patient with the  following issues.    1.  Symptoms suggestive of upper respiratory tract infection likely bronchitis at this point we will treat the patient with a course of antibiotic prescription was sent to the pharmacy.  She is instructed to take benzonatate for cough control and vitamin C 1 g daily and increase her fluid intake.  She is to take acetaminophen for symptom patient.  Understanding she was instructed to go to the emergency room over the weekend if her symptoms worsen or do not improve.    Ashlee Gage MD

## 2024-02-16 DIAGNOSIS — I48.91 ATRIAL FIBRILLATION WITH RVR (MULTI): ICD-10-CM

## 2024-02-17 RX ORDER — APIXABAN 5 MG/1
TABLET, FILM COATED ORAL 2 TIMES DAILY
Qty: 30 TABLET | Refills: 0 | Status: SHIPPED | OUTPATIENT
Start: 2024-02-17 | End: 2024-03-25

## 2024-03-10 DIAGNOSIS — L29.9 ITCHING: ICD-10-CM

## 2024-03-10 NOTE — PROGRESS NOTES
Subjective   Reason for Visit: Edda Tee is an 79 y.o. female here for her Subsequent Medicare Assessment and follow up          She is still grieving the passing of her son, Oliver   She has had a tough year     She has not been seeing anyone in cardiology.  She does not know how to work her heart monitor     She is going to begin taking Wellbutrin again   She is smoking a pack every 4- 5 days   Her son did laser treatment for smoking.   He had laser treatment done 3 days ago and he is doing fine and not smoking     She is going to make an appt with ophthalmology for an eye exam 3/2024    HEALTH:  PAP no longer needs to repeat   Mammo 1/2008, 2016, 5/19, 8/2020, 8/2021, no longer needs to repeat   BD 1/2008 T-0.9, 4/16 T-1.9, 5/19 T-2.1, 8/2021 T-2.5, 8/2023 T-3.1.   Colon 6/2008 adenoma, 5/2012 and declines repeat. Declines Cologuard   EKG 11/17 , 8/18, 6/19, 9/2020, 5/2021, 7/2021, 5/2022, 8/2022, 4/2023 ED   ECHO 9/17 normal, 5/2021, 3/2023   Carotids 9/17 <50%   Urine 2017 , 1/19   Hep C 8/18 -  FLU 1/12/2023  TDAP 2013, 6/2023 with injury   Arexvy recommended and will consider   Prevnar 5/17  Pneumo 1/2011  Zostavax never and declines   Shingrix recommend and declines   SARS CVD vaccine declines   Ophth Seen in 2/2024 for corneal abrasion left eye. She had cataract extraction in 2018. She is not wearing glasses except for night driving. No glaucoma or MD.  Copy of her Advanced Directives scanned in her chart 3/2023      Patient Care Team:  Sunshine Warren MD as PCP - General  Mango Gilbert MD as Consulting Physician (Hematology and Oncology)     Review of Systems  All systems negative except those listed in the HPI      Past Medical, Surgical, and Family History reviewed and updated in chart.  Reviewed all medications by prescribing practitioner or clinical pharmacist   (such as prescriptions, OTCs, herbal therapies and supplements) and documented in the medical record       Objective    Vitals:  Visit Vitals  /60 (BP Location: Left arm, Patient Position: Sitting)   Pulse 60   Temp 35.8 °C (96.4 °F)    Body mass index is 20.97 kg/m².        Physical Exam  Vitals reviewed.   Constitutional:       Appearance: Normal appearance.   HENT:      Head: Normocephalic.      Right Ear: Tympanic membrane, ear canal and external ear normal.      Left Ear: Tympanic membrane, ear canal and external ear normal.      Nose: Nose normal.      Mouth/Throat:      Pharynx: Oropharynx is clear.   Eyes:      Conjunctiva/sclera: Conjunctivae normal.   Cardiovascular:      Rate and Rhythm: Normal rate and regular rhythm.      Pulses: Normal pulses.      Heart sounds: Normal heart sounds.      Comments: she is not in a- fib, heart rate regular, good LE pulses bilaterally  Pulmonary:      Effort: Pulmonary effort is normal.      Breath sounds: Normal breath sounds.   Abdominal:      General: Bowel sounds are normal.      Palpations: Abdomen is soft.   Musculoskeletal:         General: Normal range of motion.      Cervical back: Normal range of motion and neck supple.   Skin:     General: Skin is warm.   Neurological:      General: No focal deficit present.      Mental Status: She is alert and oriented to person, place, and time.   Psychiatric:         Mood and Affect: Mood normal.         Behavior: Behavior normal.         Thought Content: Thought content normal.         Judgment: Judgment normal.       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)    Degenerative disease of  nervous system, unspecified (CMS/HCC)    Dilated cardiomyopathy (CMS/[facility])    Dyslipidemia    Relevant Orders    Lipid Panel    TSH with reflex to Free T4 if abnormal    Lumbar degenerative disc disease    Lung cancer (CMS/HCC)    Non-pressure chronic ulcer of other part of left foot limited to breakdown of skin (CMS/[facility]) - Primary    PAD (peripheral artery disease) (CMS/[facility])    Relevant Orders    Referral to Vascular Medicine    Protein-calorie malnutrition, unspecified severity (CMS/[facility])    Stage 3a chronic kidney disease (CMS/[facility])    Vascular myelopathy (CMS/[facility])     Other Visit Diagnoses       Acute on chronic combined systolic (congestive) and diastolic (congestive) heart failure (CMS/[facility])        Peripheral vascular disease, unspecified (CMS/[facility])        Routine general medical examination at health care facility        Situational depression        Relevant Medications    buPROPion XL (Wellbutrin XL) 150 mg 24 hr tablet            Subsequent Medicare Assessment and follow up completed  Labs ordered     Medicare Wellness completed  -  Discussed healthy diet and regular exercise.    -  Physical exam overall unremarkable. Immunizations reviewed and updated accordingly. Healthy lifestyle choices discussed (tobacco avoidance, appropriate alcohol use, avoidance of illicit substances).   -  Patient is wearing seatbelt.   -  Screening lab work ordered as indicated.    -  Age appropriate screening tests reviewed with patient.       We spent time discussing depression screen and there is nothing found that is of concern for underling depression. The PQH form was filled and the meds reviewed. She is grieving the passing of her son in 6/2023.      We discussed issues with alcohol and she is currently drinking a rare wine . I see no concerns about overuse.      She does not have grab bars in the shower. Recommend she install grab bars immediately 3/2024   She has not fallen recently and no risk of falls in the house   She  has good lighting around the house and functioning smoke detectors.      She is  with 2 sons. Son passed on 6/27/2023 from cirrhosis.      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 She is smoking a pack every 4- 5 days  Her son did laser treatment for smoking.  He had laser treatment done 3 days ago and he is doing fine and not smoking. She can look into laser or hypnosis also a good option   Nicotine dependence I spent more than 3 minutes counseling patient about the need for smoking cessation and how I can support efforts when patient is ready to quit. Discussed nicotine replacement therapy, Varenicline, Buproprion, hypnosis, support groups and acupuncture as potential options. Patient currently has no signs or symptoms of tobacco related disease.  (Dx code F17.2 or Z87.891)(Billing code 59093 or 65498)       Her hearing is not as good as it used to be   She declines appt with audiology for now 1/2023      Her weight/ BMI is in normal range in office, recommend she maintain  Her weight is 111 and BMI 20.97 IO 3/2024  She is not eating well but had a nice meal last night  She sometimes has emesis postprandial       HTN: Stable.   Continue atenolol 25 mg daily and hydroxyzine 25 mg TID   ECHO 5/2021 was normal but poor functional capacity   Stress test normal 5/2021  EKG 11/2022 showed a fib with RVR  Recommend she monitor her BP at home and call with elevated readings.    She saw Tracey Schwab in 8/2022 and discontinued lisinopril  She saw Dr Robles in 4/2023. Dr Robles is leaving and she has to find a new cardiologist. Recommend seeing Dr Edward, Dr Cohen or Dr Perez   Recommend and orders placed for cardiology 3/2024- recommend Dr Edward       Acute respiratory failure and cardiac tamponade.   Admitted on 3/30/2023 and discharged on 4/4/2023  S/p  percutaneous pericardiocentesis on 3/30/2023.   Approximately 400 to 500 cc of blood removed from the pericardial sac.  She presented on 3/30/2023 for elective outpatient ICD placement which was complicated by cardiac tamponade and acute hypoxic respiratory failure.   Pt initially managed in ICU with mechanical ventilation and pericardial drain placement. Pericardial drain has since been removed, repeat echo shows resolution of pericardial effusion.   She saw Dr Robles in 4/2023.   Recommend seeing Dr Edward, Dr Cohen or Dr Perez    Recommend and orders placed for cardiology 3/2024- recommend Dr Edward       Long standing atrial Fib with prior TIA in 9/2017: On exam: she is not in a- fib 3/2024. She has not been seeing anyone in cardiology. She does not know how to work her heart monitor    Patient was cardioverted on 4/20/23 and converted to normal sinus rhythm.    EP did an implantable loop on 11/18/2022     S/p DC shock and dual chamber ICD interrogation and reporgramming on 4/20/2023 with Dr Perez   Continue carvedilol 6.25 mg BID and amiodarone 200 mg daily  Continue spironolactone 25 mg daily and atenolol 25 mg daily  Continue Eliquis 5 mg BID and hydroxyzine 25 mg BID  US 4/2023 at bedside shows no evidence of pericardial effusion, however she does have B-lines in all lung fields consistent with pulmonary edema.    CXR 4/2023 showed pulmonary edema   EKG 4/2023: Demand pacemaker with underlying A-fib, rate of 96 bpm, QTc 477 ms. No ST elevations or depressions, no acute ischemic injury pattern. T wave inversions in lateral leads are new compared to prior EKGs.  Carotid duplex: <50% stenosis bilateral vertebra arteries patent with antegrade flow  She was on Pradaxa then Eliquis since 9/17 after she was admitted with TIA 9/17   She saw Dr Mcneill in 4/2023. Dr Robles has left   Recommend and orders placed for cardiac electrophysiology for further evaluation 3/2024. Recommend and orders placed for cardiology  3/2024- recommend Dr Edward      Middle cerebral aneurysm right side :  She needs CT angio and has to schedule since 2 mm only      I have spent 15 min face to face with this patient discussing their cardiac risk and behavioral therapies of nutrition choices and exercise. We are trying to eliminate habits that are contributing to their cardiac risk.  We agreed on a plan of how they can reduce their current CV risk   The patient's 10 yr CV risk was estimated at 22 % 9/2023. If she just stops smoking we can get her to 17% 3/2024. 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   Dr Pina did EBUS on 12/19/2022 showed Interval increase in size of right middle lobe mass when compared with the previous CT from 08/23/2021, concerning for neoplastic etiology. Interval increase in size of multiple mediastinal lymph nodes as detailed above, concerning for metastatic lymphadenopathy. Moderate upper lobe predominant emphysematous changes.   Pathology results 12/2022 showed malignant cells derived from SCC   -treated with SBRT (5 treatments) from 1/30 to 2/3/2022  -5/8/2023 PET showed interval decrease in size of RML mass with mild residual activity  -11/10/2023 chest CT showed interval decrease in size of subcarinal node 1.2 cm, interval decrease in size of right lower paratracheal node 0.7 cm; similar size of consolidative airspace opacity within RML 3.3 x 1.6 cm  with surrounding bandlike opacities with associated  traction bronchiectasis and volume loss compatible with postradiation fibrosis  She saw Rad Onc in 8/2023   She saw Heme onc in 11/2023-rad onc has booked her next chest CT for 5/14/2024      LE edema:  Continue furosemide 40 mg daily   Monitor daily sodium intake  Elevate legs for 45 minutes in the afternoons   Elevate legs as much as possible during the day     Situational depression:  She is still grieving the loss of her son less than a year ago.    Her son passed on 6/27/2023 from cirrhosis.    She is still grieving the passing of her son in 6/2023.   She admits she has depression about her sons passing.    She is happy when she gets to see her grandchildren  She is going to restart Wellbutrin 150 mg daily for smoking cessation and that should help her depression      Insomnia:  She stopped trazodone.  She is sleeping better      Constipation:   Explained systemic medications that are contributing to her constipation   She is to avoid straining with bowel movement   Continue Mg 200 mg daily.  She can add a stool softener when needed     Neurogenic claudication BLE:   She saw Dr Roth and he did an US of her legs was concerning.  Aortogram 7/2021 with lower extremity angiography showed critical proximal right iliac, renal artery stenosis of about 90% or more with heavy calcifications, completely occluded left SFA throughout its length, and a focal mid right SFA stenosis of about 90% had iliac artery stenting via the right femoral approach without complication   She saw Dr Mota in 11/2021 and feels she has PAD versus peripheral neuropathy   MRI of L/S 11/2021 did not show stenosis   She saw Dr Mota in 11/2021 and EMG studies 11/2021 were normal   She restarted PT but on the 3 rd session she pulled a hamstring and has not been back. She continues to do her exercises at home   Continue cilostazol 50 mg daily   She saw Dr Winn in 2/2022 and recommend continued medical management   Recommend and orders  placed for Dr Zack Das for evaluation 3/2024        Seen in the ED on 2022 for mechanical fall   She reports she fell off a stool because she missed the last step and went forward over the stool sliding across the floor. She denies hitting her head.   Patient was found to have a clavicle fracture and healing well and no issues   She will follow up with ortho       Spinal Stenosis: her back pain is chronic   She should wait on any spine surgery until pain is consistent and intolerable.   Xray L/S 2020 showed severe multilevel spondylosis worse at L4- 5 and mild anterolisthesis of L3 on L4 and retrolisthesis of L1 on L2   She will continue to follow with Dr Almanzar and Dr Porras   She has done PT and chiropractor in the past and declines to do more   Recommend she look into a girdle to help with posture and back pain      She has intermittent cold sores:  She is no longer taking acyclovir      Mammo in 2021 was normal. She declines mammo now due to extensive work up and imaging for SCC lung cancer.   She had PET/ CT in 2023.   Breast exam normal 2023  BD in 2023 was T-3.1. Reviewed the results of her BD from 2023 with patient in detail 2023. Explained she is osteoporosis stage. Discussed systemic medications to treat OP along with possible side effects. She will consider medications 2023  Continue Calcium daily and eat 2 servings of calcium enriched foods daily.   Discussed importance of weight bearing exercises.      Colonoscopy with Dr Jain in  was normal.  She knows a couple of people who  during colonoscopy and she is afraid to repeat it. She declines Cologuard because her sister had false positive results.  She declines colonoscopy and Cologuard 2021     Ophth: She is going to make an appt with ophthalmology for an eye exam 3/2024  Seen in 2024 for corneal abrasion left eye. She had cataract extraction in 2018. She is not wearing glasses except for night driving. No  glaucoma or MD.     I spent 15 min with the patient discussing their wishes for end of life choices.   We discussed the need for a Living Will and that wishes should be discussed with Family. The DNR status was reviewed, and we discussed the options of this and, the DNR _CC options as well.   We also went over how important it was to have these choices written down and clear for any surviving family so that their wishes are followed   The patient and I came to to following agreement :   She has a LW and MPOA.  Copy of her Advanced Directives scanned in her chart 3/2023     Hep C 8/18 -  FLU 1/12/2023  TDAP around 2013, 6/7/2023 with injury   Arexvy recommended and will consider   Prevnar 5/17  Pneumo 1/2011  Zostavax never and declines   Shingrix recommend and declines   SARS CVD vaccine declines    Disability placard given 5/2023 for 5 years      Some elements in the chart were copied from Dr. Warren's last office visit with patient.   Notes have been updated where appropriate, and reflect my current medical decision making from today.       RTC in 4 months for follow up or sooner if needed   (MCR due 3/2025)     Scribe Attestation  By signing my name below, I, Jessica Blackman , Scribe   attest that this documentation has been prepared under the direction and in the presence of Sunshine Warren, MDPatient was identified as a fall risk. Risk prevention instructions provided.

## 2024-03-11 ENCOUNTER — LAB (OUTPATIENT)
Dept: LAB | Facility: LAB | Age: 79
End: 2024-03-11
Payer: MEDICARE

## 2024-03-11 ENCOUNTER — OFFICE VISIT (OUTPATIENT)
Dept: PRIMARY CARE | Facility: CLINIC | Age: 79
End: 2024-03-11
Payer: MEDICARE

## 2024-03-11 VITALS
DIASTOLIC BLOOD PRESSURE: 60 MMHG | BODY MASS INDEX: 20.96 KG/M2 | OXYGEN SATURATION: 96 % | WEIGHT: 111 LBS | HEIGHT: 61 IN | SYSTOLIC BLOOD PRESSURE: 110 MMHG | HEART RATE: 60 BPM | TEMPERATURE: 96.4 F

## 2024-03-11 DIAGNOSIS — F43.21 SITUATIONAL DEPRESSION: ICD-10-CM

## 2024-03-11 DIAGNOSIS — I50.22 CHRONIC SYSTOLIC CONGESTIVE HEART FAILURE (MULTI): ICD-10-CM

## 2024-03-11 DIAGNOSIS — N18.31 STAGE 3A CHRONIC KIDNEY DISEASE (MULTI): ICD-10-CM

## 2024-03-11 DIAGNOSIS — C34.31 MALIGNANT NEOPLASM OF LOWER LOBE OF RIGHT LUNG (MULTI): ICD-10-CM

## 2024-03-11 DIAGNOSIS — L97.521 NON-PRESSURE CHRONIC ULCER OF OTHER PART OF LEFT FOOT LIMITED TO BREAKDOWN OF SKIN (MULTI): Primary | ICD-10-CM

## 2024-03-11 DIAGNOSIS — G95.19: ICD-10-CM

## 2024-03-11 DIAGNOSIS — I50.43 ACUTE ON CHRONIC COMBINED SYSTOLIC (CONGESTIVE) AND DIASTOLIC (CONGESTIVE) HEART FAILURE (MULTI): ICD-10-CM

## 2024-03-11 DIAGNOSIS — I73.9 PAD (PERIPHERAL ARTERY DISEASE) (CMS-HCC): ICD-10-CM

## 2024-03-11 DIAGNOSIS — I10 BENIGN ESSENTIAL HYPERTENSION: ICD-10-CM

## 2024-03-11 DIAGNOSIS — J96.21 ACUTE AND CHRONIC RESPIRATORY FAILURE WITH HYPOXIA (MULTI): ICD-10-CM

## 2024-03-11 DIAGNOSIS — Z00.00 ROUTINE GENERAL MEDICAL EXAMINATION AT HEALTH CARE FACILITY: ICD-10-CM

## 2024-03-11 DIAGNOSIS — I42.0 DILATED CARDIOMYOPATHY (MULTI): ICD-10-CM

## 2024-03-11 DIAGNOSIS — E78.5 DYSLIPIDEMIA: ICD-10-CM

## 2024-03-11 DIAGNOSIS — I77.810 ASCENDING AORTA DILATION (CMS-HCC): ICD-10-CM

## 2024-03-11 DIAGNOSIS — I48.91 ATRIAL FIBRILLATION WITH RVR (MULTI): ICD-10-CM

## 2024-03-11 DIAGNOSIS — I73.9 PERIPHERAL VASCULAR DISEASE, UNSPECIFIED (CMS-HCC): ICD-10-CM

## 2024-03-11 DIAGNOSIS — E46 PROTEIN-CALORIE MALNUTRITION, UNSPECIFIED SEVERITY (MULTI): ICD-10-CM

## 2024-03-11 DIAGNOSIS — I51.7 CARDIOMEGALY: ICD-10-CM

## 2024-03-11 DIAGNOSIS — M51.36 LUMBAR DEGENERATIVE DISC DISEASE: ICD-10-CM

## 2024-03-11 DIAGNOSIS — J43.9 PULMONARY EMPHYSEMA, UNSPECIFIED EMPHYSEMA TYPE (MULTI): ICD-10-CM

## 2024-03-11 DIAGNOSIS — G31.9 DEGENERATIVE DISEASE OF NERVOUS SYSTEM, UNSPECIFIED (CMS-HCC): ICD-10-CM

## 2024-03-11 PROBLEM — R53.1 WEAKNESS: Status: RESOLVED | Noted: 2023-09-05 | Resolved: 2024-03-11

## 2024-03-11 LAB
CHOLEST SERPL-MCNC: 184 MG/DL (ref 0–199)
CHOLESTEROL/HDL RATIO: 3.1
HDLC SERPL-MCNC: 59.2 MG/DL
LDLC SERPL CALC-MCNC: 93 MG/DL
MAGNESIUM SERPL-MCNC: 2.35 MG/DL (ref 1.6–2.4)
NON HDL CHOLESTEROL: 125 MG/DL (ref 0–149)
TRIGL SERPL-MCNC: 159 MG/DL (ref 0–149)
TSH SERPL-ACNC: 2.09 MIU/L (ref 0.44–3.98)
VLDL: 32 MG/DL (ref 0–40)

## 2024-03-11 PROCEDURE — 99406 BEHAV CHNG SMOKING 3-10 MIN: CPT | Performed by: INTERNAL MEDICINE

## 2024-03-11 PROCEDURE — 1157F ADVNC CARE PLAN IN RCRD: CPT | Performed by: INTERNAL MEDICINE

## 2024-03-11 PROCEDURE — G0444 DEPRESSION SCREEN ANNUAL: HCPCS | Performed by: INTERNAL MEDICINE

## 2024-03-11 PROCEDURE — 84443 ASSAY THYROID STIM HORMONE: CPT

## 2024-03-11 PROCEDURE — 3074F SYST BP LT 130 MM HG: CPT | Performed by: INTERNAL MEDICINE

## 2024-03-11 PROCEDURE — G0446 INTENS BEHAVE THER CARDIO DX: HCPCS | Performed by: INTERNAL MEDICINE

## 2024-03-11 PROCEDURE — 1159F MED LIST DOCD IN RCRD: CPT | Performed by: INTERNAL MEDICINE

## 2024-03-11 PROCEDURE — 36415 COLL VENOUS BLD VENIPUNCTURE: CPT

## 2024-03-11 PROCEDURE — 1124F ACP DISCUSS-NO DSCNMKR DOCD: CPT | Performed by: INTERNAL MEDICINE

## 2024-03-11 PROCEDURE — 99214 OFFICE O/P EST MOD 30 MIN: CPT | Performed by: INTERNAL MEDICINE

## 2024-03-11 PROCEDURE — 1160F RVW MEDS BY RX/DR IN RCRD: CPT | Performed by: INTERNAL MEDICINE

## 2024-03-11 PROCEDURE — 1170F FXNL STATUS ASSESSED: CPT | Performed by: INTERNAL MEDICINE

## 2024-03-11 PROCEDURE — 3078F DIAST BP <80 MM HG: CPT | Performed by: INTERNAL MEDICINE

## 2024-03-11 PROCEDURE — 80061 LIPID PANEL: CPT

## 2024-03-11 PROCEDURE — G0439 PPPS, SUBSEQ VISIT: HCPCS | Performed by: INTERNAL MEDICINE

## 2024-03-11 PROCEDURE — 1036F TOBACCO NON-USER: CPT | Performed by: INTERNAL MEDICINE

## 2024-03-11 PROCEDURE — 83735 ASSAY OF MAGNESIUM: CPT

## 2024-03-11 RX ORDER — PREDNISOLONE ACETATE 10 MG/ML
1 SUSPENSION/ DROPS OPHTHALMIC 4 TIMES DAILY
COMMUNITY
Start: 2024-02-26

## 2024-03-11 RX ORDER — BUPROPION HYDROCHLORIDE 150 MG/1
150 TABLET ORAL EVERY MORNING
Qty: 30 TABLET | Refills: 11 | Status: SHIPPED | OUTPATIENT
Start: 2024-03-11 | End: 2024-05-09 | Stop reason: WASHOUT

## 2024-03-11 RX ORDER — HYDROXYZINE HYDROCHLORIDE 25 MG/1
25 TABLET, FILM COATED ORAL 2 TIMES DAILY
Qty: 90 TABLET | Refills: 0 | Status: SHIPPED | OUTPATIENT
Start: 2024-03-11 | End: 2024-05-02

## 2024-03-11 ASSESSMENT — PATIENT HEALTH QUESTIONNAIRE - PHQ9
SUM OF ALL RESPONSES TO PHQ9 QUESTIONS 1 AND 2: 0
2. FEELING DOWN, DEPRESSED OR HOPELESS: NOT AT ALL
1. LITTLE INTEREST OR PLEASURE IN DOING THINGS: NOT AT ALL

## 2024-03-11 ASSESSMENT — ENCOUNTER SYMPTOMS
DEPRESSION: 0
LOSS OF SENSATION IN FEET: 0
OCCASIONAL FEELINGS OF UNSTEADINESS: 0

## 2024-03-11 ASSESSMENT — ACTIVITIES OF DAILY LIVING (ADL)
GROCERY_SHOPPING: INDEPENDENT
MANAGING_FINANCES: INDEPENDENT
DRESSING: INDEPENDENT
BATHING: INDEPENDENT
DOING_HOUSEWORK: INDEPENDENT
TAKING_MEDICATION: INDEPENDENT

## 2024-03-11 NOTE — PROGRESS NOTES
"Subjective   Reason for Visit: Edda Tee is an 79 y.o. female here for a Medicare Wellness visit.     Past Medical, Surgical, and Family History reviewed and updated in chart.    Reviewed all medications by prescribing practitioner or clinical pharmacist (such as prescriptions, OTCs, herbal therapies and supplements) and documented in the medical record.    HPI    Patient Care Team:  Sunshine Warren MD as PCP - General  Sunshine Warren MD as PCP - O Medicare Advantage PCP  Mango Gilbert MD as Consulting Physician (Hematology and Oncology)     Review of Systems    Objective   Vitals:  /60 (BP Location: Left arm, Patient Position: Sitting)   Pulse 60   Temp 35.8 °C (96.4 °F)   Ht 1.549 m (5' 1\")   Wt 50.3 kg (111 lb)   SpO2 96%   BMI 20.97 kg/m²       Physical Exam    Assessment/Plan   Problem List Items Addressed This Visit     Ascending aorta dilation (CMS/HCC)    Atrial fibrillation with RVR (CMS/HCC)    Benign essential hypertension    Cardiomegaly    Chronic systolic congestive heart failure (CMS/HCC)    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 Treatment plan established. Disease monitoring and precautions, any treatment changes, patient instructions, and follow-up are documented in encounter note.          Protein-calorie malnutrition, unspecified severity (CMS/HCC)    Lumbar degenerative disc disease    Non-pressure chronic ulcer of other part of left foot limited to breakdown of skin (CMS/HCC) - Primary    Acute and chronic respiratory failure with hypoxia (CMS/HCC)    Stage 3a chronic kidney disease (CMS/HCC)   Other Visit Diagnoses     Acute on chronic combined systolic " (congestive) and diastolic (congestive) heart failure (CMS/HCC)        Peripheral vascular disease, unspecified (CMS/[facility])        Routine general medical examination at health care facility

## 2024-03-21 ENCOUNTER — HOSPITAL ENCOUNTER (EMERGENCY)
Facility: HOSPITAL | Age: 79
Discharge: HOME | End: 2024-03-21
Attending: EMERGENCY MEDICINE
Payer: MEDICARE

## 2024-03-21 ENCOUNTER — APPOINTMENT (OUTPATIENT)
Dept: RADIOLOGY | Facility: HOSPITAL | Age: 79
End: 2024-03-21
Payer: MEDICARE

## 2024-03-21 ENCOUNTER — APPOINTMENT (OUTPATIENT)
Dept: CARDIOLOGY | Facility: HOSPITAL | Age: 79
End: 2024-03-21
Payer: MEDICARE

## 2024-03-21 VITALS
BODY MASS INDEX: 20.2 KG/M2 | HEART RATE: 75 BPM | DIASTOLIC BLOOD PRESSURE: 78 MMHG | TEMPERATURE: 98.1 F | SYSTOLIC BLOOD PRESSURE: 169 MMHG | HEIGHT: 63 IN | RESPIRATION RATE: 16 BRPM | OXYGEN SATURATION: 98 % | WEIGHT: 114 LBS

## 2024-03-21 DIAGNOSIS — R91.1 PULMONARY NODULE: ICD-10-CM

## 2024-03-21 DIAGNOSIS — I77.819 AORTIC DILATATION (CMS-HCC): ICD-10-CM

## 2024-03-21 DIAGNOSIS — J18.9 PNEUMONIA DUE TO INFECTIOUS ORGANISM, UNSPECIFIED LATERALITY, UNSPECIFIED PART OF LUNG: Primary | ICD-10-CM

## 2024-03-21 LAB
ALBUMIN SERPL BCP-MCNC: 3 G/DL (ref 3.4–5)
ALP SERPL-CCNC: 109 U/L (ref 33–136)
ALT SERPL W P-5'-P-CCNC: 33 U/L (ref 7–45)
ANION GAP SERPL CALC-SCNC: 13 MMOL/L (ref 10–20)
AST SERPL W P-5'-P-CCNC: 33 U/L (ref 9–39)
BASOPHILS # BLD AUTO: 0.02 X10*3/UL (ref 0–0.1)
BASOPHILS NFR BLD AUTO: 0.1 %
BILIRUB SERPL-MCNC: 0.6 MG/DL (ref 0–1.2)
BNP SERPL-MCNC: 1495 PG/ML (ref 0–99)
BUN SERPL-MCNC: 38 MG/DL (ref 6–23)
CALCIUM SERPL-MCNC: 7.3 MG/DL (ref 8.6–10.3)
CARDIAC TROPONIN I PNL SERPL HS: 22 NG/L (ref 0–13)
CARDIAC TROPONIN I PNL SERPL HS: 29 NG/L (ref 0–13)
CHLORIDE SERPL-SCNC: 108 MMOL/L (ref 98–107)
CO2 SERPL-SCNC: 18 MMOL/L (ref 21–32)
CREAT SERPL-MCNC: 1.33 MG/DL (ref 0.5–1.05)
D DIMER PPP FEU-MCNC: 1064 NG/ML FEU
EGFRCR SERPLBLD CKD-EPI 2021: 41 ML/MIN/1.73M*2
EOSINOPHIL # BLD AUTO: 0 X10*3/UL (ref 0–0.4)
EOSINOPHIL NFR BLD AUTO: 0 %
ERYTHROCYTE [DISTWIDTH] IN BLOOD BY AUTOMATED COUNT: 14.8 % (ref 11.5–14.5)
GLUCOSE SERPL-MCNC: 101 MG/DL (ref 74–99)
HCT VFR BLD AUTO: 38.5 % (ref 36–46)
HGB BLD-MCNC: 12.1 G/DL (ref 12–16)
IMM GRANULOCYTES # BLD AUTO: 0.1 X10*3/UL (ref 0–0.5)
IMM GRANULOCYTES NFR BLD AUTO: 0.7 % (ref 0–0.9)
INR PPP: 1.3 (ref 0.9–1.1)
LYMPHOCYTES # BLD AUTO: 1.84 X10*3/UL (ref 0.8–3)
LYMPHOCYTES NFR BLD AUTO: 12.9 %
MCH RBC QN AUTO: 31.2 PG (ref 26–34)
MCHC RBC AUTO-ENTMCNC: 31.4 G/DL (ref 32–36)
MCV RBC AUTO: 99 FL (ref 80–100)
MONOCYTES # BLD AUTO: 1.3 X10*3/UL (ref 0.05–0.8)
MONOCYTES NFR BLD AUTO: 9.1 %
NEUTROPHILS # BLD AUTO: 11.02 X10*3/UL (ref 1.6–5.5)
NEUTROPHILS NFR BLD AUTO: 77.2 %
NRBC BLD-RTO: 0 /100 WBCS (ref 0–0)
PLATELET # BLD AUTO: 203 X10*3/UL (ref 150–450)
POTASSIUM SERPL-SCNC: 4.9 MMOL/L (ref 3.5–5.3)
PROT SERPL-MCNC: 6.2 G/DL (ref 6.4–8.2)
PROTHROMBIN TIME: 14.6 SECONDS (ref 9.8–12.8)
RBC # BLD AUTO: 3.88 X10*6/UL (ref 4–5.2)
SODIUM SERPL-SCNC: 134 MMOL/L (ref 136–145)
WBC # BLD AUTO: 14.3 X10*3/UL (ref 4.4–11.3)

## 2024-03-21 PROCEDURE — 2500000004 HC RX 250 GENERAL PHARMACY W/ HCPCS (ALT 636 FOR OP/ED): Performed by: PHYSICIAN ASSISTANT

## 2024-03-21 PROCEDURE — 2550000001 HC RX 255 CONTRASTS: Performed by: EMERGENCY MEDICINE

## 2024-03-21 PROCEDURE — 83880 ASSAY OF NATRIURETIC PEPTIDE: CPT | Performed by: PHYSICIAN ASSISTANT

## 2024-03-21 PROCEDURE — 36415 COLL VENOUS BLD VENIPUNCTURE: CPT | Performed by: EMERGENCY MEDICINE

## 2024-03-21 PROCEDURE — 84484 ASSAY OF TROPONIN QUANT: CPT | Performed by: EMERGENCY MEDICINE

## 2024-03-21 PROCEDURE — 71275 CT ANGIOGRAPHY CHEST: CPT | Mod: FOREIGN READ | Performed by: RADIOLOGY

## 2024-03-21 PROCEDURE — 74177 CT ABD & PELVIS W/CONTRAST: CPT | Mod: FOREIGN READ | Performed by: RADIOLOGY

## 2024-03-21 PROCEDURE — 85025 COMPLETE CBC W/AUTO DIFF WBC: CPT | Performed by: PHYSICIAN ASSISTANT

## 2024-03-21 PROCEDURE — 85025 COMPLETE CBC W/AUTO DIFF WBC: CPT | Performed by: EMERGENCY MEDICINE

## 2024-03-21 PROCEDURE — 80053 COMPREHEN METABOLIC PANEL: CPT | Performed by: EMERGENCY MEDICINE

## 2024-03-21 PROCEDURE — 96374 THER/PROPH/DIAG INJ IV PUSH: CPT

## 2024-03-21 PROCEDURE — 71275 CT ANGIOGRAPHY CHEST: CPT

## 2024-03-21 PROCEDURE — 85379 FIBRIN DEGRADATION QUANT: CPT | Performed by: EMERGENCY MEDICINE

## 2024-03-21 PROCEDURE — 94640 AIRWAY INHALATION TREATMENT: CPT

## 2024-03-21 PROCEDURE — 74177 CT ABD & PELVIS W/CONTRAST: CPT

## 2024-03-21 PROCEDURE — 36415 COLL VENOUS BLD VENIPUNCTURE: CPT | Performed by: PHYSICIAN ASSISTANT

## 2024-03-21 PROCEDURE — 93005 ELECTROCARDIOGRAM TRACING: CPT

## 2024-03-21 PROCEDURE — 2500000002 HC RX 250 W HCPCS SELF ADMINISTERED DRUGS (ALT 637 FOR MEDICARE OP, ALT 636 FOR OP/ED): Performed by: PHYSICIAN ASSISTANT

## 2024-03-21 PROCEDURE — 99285 EMERGENCY DEPT VISIT HI MDM: CPT | Mod: 25

## 2024-03-21 PROCEDURE — 2500000001 HC RX 250 WO HCPCS SELF ADMINISTERED DRUGS (ALT 637 FOR MEDICARE OP): Performed by: PHYSICIAN ASSISTANT

## 2024-03-21 PROCEDURE — 85610 PROTHROMBIN TIME: CPT | Performed by: PHYSICIAN ASSISTANT

## 2024-03-21 RX ORDER — AMOXICILLIN AND CLAVULANATE POTASSIUM 875; 125 MG/1; MG/1
1 TABLET, FILM COATED ORAL ONCE
Status: COMPLETED | OUTPATIENT
Start: 2024-03-21 | End: 2024-03-21

## 2024-03-21 RX ORDER — DOXYCYCLINE 100 MG/1
100 TABLET ORAL 2 TIMES DAILY
Qty: 14 TABLET | Refills: 0 | Status: SHIPPED | OUTPATIENT
Start: 2024-03-22 | End: 2024-03-29

## 2024-03-21 RX ORDER — DOXYCYCLINE 100 MG/1
100 CAPSULE ORAL ONCE
Status: COMPLETED | OUTPATIENT
Start: 2024-03-21 | End: 2024-03-21

## 2024-03-21 RX ORDER — AMOXICILLIN AND CLAVULANATE POTASSIUM 875; 125 MG/1; MG/1
875 TABLET, FILM COATED ORAL EVERY 12 HOURS
Qty: 14 TABLET | Refills: 0 | Status: SHIPPED | OUTPATIENT
Start: 2024-03-21 | End: 2024-03-28

## 2024-03-21 RX ORDER — IPRATROPIUM BROMIDE AND ALBUTEROL SULFATE 2.5; .5 MG/3ML; MG/3ML
3 SOLUTION RESPIRATORY (INHALATION) ONCE
Status: COMPLETED | OUTPATIENT
Start: 2024-03-21 | End: 2024-03-21

## 2024-03-21 RX ADMIN — DOXYCYCLINE HYCLATE 100 MG: 100 CAPSULE ORAL at 16:00

## 2024-03-21 RX ADMIN — METHYLPREDNISOLONE SODIUM SUCCINATE 125 MG: 125 INJECTION, POWDER, FOR SOLUTION INTRAMUSCULAR; INTRAVENOUS at 11:44

## 2024-03-21 RX ADMIN — AMOXICILLIN AND CLAVULANATE POTASSIUM 1 TABLET: 875; 125 TABLET, FILM COATED ORAL at 16:00

## 2024-03-21 RX ADMIN — IPRATROPIUM BROMIDE AND ALBUTEROL SULFATE 3 ML: 2.5; .5 SOLUTION RESPIRATORY (INHALATION) at 11:45

## 2024-03-21 RX ADMIN — IOHEXOL 70 ML: 350 INJECTION, SOLUTION INTRAVENOUS at 13:56

## 2024-03-21 ASSESSMENT — LIFESTYLE VARIABLES
EVER HAD A DRINK FIRST THING IN THE MORNING TO STEADY YOUR NERVES TO GET RID OF A HANGOVER: NO
HAVE PEOPLE ANNOYED YOU BY CRITICIZING YOUR DRINKING: NO
HAVE YOU EVER FELT YOU SHOULD CUT DOWN ON YOUR DRINKING: NO
EVER FELT BAD OR GUILTY ABOUT YOUR DRINKING: NO

## 2024-03-21 ASSESSMENT — PAIN DESCRIPTION - PROGRESSION: CLINICAL_PROGRESSION: NOT CHANGED

## 2024-03-21 ASSESSMENT — COLUMBIA-SUICIDE SEVERITY RATING SCALE - C-SSRS
2. HAVE YOU ACTUALLY HAD ANY THOUGHTS OF KILLING YOURSELF?: NO
1. IN THE PAST MONTH, HAVE YOU WISHED YOU WERE DEAD OR WISHED YOU COULD GO TO SLEEP AND NOT WAKE UP?: NO
6. HAVE YOU EVER DONE ANYTHING, STARTED TO DO ANYTHING, OR PREPARED TO DO ANYTHING TO END YOUR LIFE?: NO

## 2024-03-21 ASSESSMENT — PAIN DESCRIPTION - FREQUENCY: FREQUENCY: CONSTANT/CONTINUOUS

## 2024-03-21 ASSESSMENT — PAIN - FUNCTIONAL ASSESSMENT: PAIN_FUNCTIONAL_ASSESSMENT: 0-10

## 2024-03-21 ASSESSMENT — PAIN DESCRIPTION - DIRECTION: RADIATING_TOWARDS: FRONT

## 2024-03-21 ASSESSMENT — PAIN SCALES - GENERAL: PAINLEVEL_OUTOF10: 7

## 2024-03-21 ASSESSMENT — PAIN DESCRIPTION - DESCRIPTORS: DESCRIPTORS: CRUSHING

## 2024-03-21 ASSESSMENT — PAIN DESCRIPTION - ONSET: ONSET: ONGOING

## 2024-03-21 ASSESSMENT — PAIN DESCRIPTION - ORIENTATION: ORIENTATION: POSTERIOR

## 2024-03-21 ASSESSMENT — PAIN DESCRIPTION - LOCATION: LOCATION: BACK

## 2024-03-21 NOTE — ED PROVIDER NOTES
HPI   Chief Complaint   Patient presents with    Back Pain       This is a 79-year-old female with PMH right lower lung neoplasm, CHF, cardiomyopathy, emphysema, A-fib, PVD, HLD, pacemaker, on Eliquis presenting for evaluation of right-sided flank and bilateral rib pain x 1 week.  States the pain in her ribs is pleuritic and radiates to her sternum.  Seems to be worse with movement of her trunk.  She endorses shortness of breath.  Is still a smoker.  Denies numbness, tingling or loss of sensation, hemoptysis, fevers, chills, hematuria, saddle anesthesia, bowel or bladder changes.  Denies any midline thoracic or low back pain.      History provided by:  Patient   used: No                        Elizabeth Coma Scale Score: 15                     Patient History   Past Medical History:   Diagnosis Date    Cellulitis of right lower limb 06/29/2020    Cellulitis of both feet    ERRONEOUS ENCOUNTER--DISREGARD     Low back pain, unspecified 08/18/2022    Acute bilateral low back pain without sciatica    Nicotine dependence, cigarettes, uncomplicated 07/26/2022    Tobacco dependence due to cigarettes    Other vascular myelopathies (CMS/HCC) 12/08/2021    Neurogenic claudication    Pain in unspecified ankle and joints of unspecified foot 08/29/2020    Ankle pain    Personal history of other drug therapy     History of anticoagulant therapy    Personal history of other endocrine, nutritional and metabolic disease 08/18/2022    History of hyperkalemia    Personal history of other infectious and parasitic diseases 01/29/2018    History of herpes labialis    Personal history of other specified conditions 09/04/2020    History of palpitations    Radiculopathy, lumbar region 12/08/2021    Lumbar radiculopathy    Radiculopathy, lumbar region 07/02/2021    Left lumbar radiculopathy    Tobacco abuse counseling 05/06/2022    Tobacco abuse counseling    Transient cerebral ischemic attack, unspecified     TIA on  medication    Unspecified mononeuropathy of unspecified lower limb 10/01/2021    Lower extremity neuropathy    Urinary tract infection, site not specified 08/31/2020    Acute UTI     Past Surgical History:   Procedure Laterality Date    CT AORTA AND BILATERAL ILIOFEMORAL RUNOFF ANGIOGRAM W AND/OR WO IV CONTRAST  3/8/2023    CT AORTA AND BILATERAL ILIOFEMORAL RUNOFF ANGIOGRAM W AND/OR WO IV CONTRAST STJ CT    OTHER SURGICAL HISTORY  11/22/2022    Loop recorder insertion     Family History   Problem Relation Name Age of Onset    Hypertension Mother      Diabetes Mother      Stroke Father       Social History     Tobacco Use    Smoking status: Former     Types: Cigarettes    Smokeless tobacco: Never   Vaping Use    Vaping Use: Never used   Substance Use Topics    Alcohol use: Never    Drug use: Never       Physical Exam   ED Triage Vitals [03/21/24 1056]   Temperature Heart Rate Respirations BP   36.7 °C (98.1 °F) 69 18 (!) 184/95      Pulse Ox Temp Source Heart Rate Source Patient Position   98 % Temporal Monitor Sitting      BP Location FiO2 (%)     Right leg --       Physical Exam  Constitutional:       Appearance: Normal appearance.   HENT:      Mouth/Throat:      Mouth: Mucous membranes are moist.      Pharynx: Oropharynx is clear.   Cardiovascular:      Rate and Rhythm: Tachycardia present. Rhythm irregular.      Pulses: Normal pulses.      Heart sounds: Murmur heard.   Pulmonary:      Comments: Tachypneic.  Expiratory wheezing at the bases bilaterally.  Abdominal:      General: There is no distension.      Palpations: Abdomen is soft.      Tenderness: There is no abdominal tenderness. There is no guarding.   Musculoskeletal:      Comments: No midline or paraspinal thoracic or lumbar tenderness.  No step-off or crepitance.   Skin:     General: Skin is warm and dry.   Neurological:      General: No focal deficit present.      Mental Status: She is alert and oriented to person, place, and time.         ED Course &  [facility]   Diagnoses as of 03/21/24 1605   Pneumonia due to infectious organism, unspecified laterality, unspecified part of lung   Pulmonary nodule   Aortic dilatation (CMS/HCC)       Medical Decision Making  DDx: PE, ACS, dysrhythmia, pneumonia, pneumothorax, pleural effusion, disc pain, stone, intra-abdominal process, pyelonephritis, ureterolithiasis    Patient is tachypneic.  Has bilateral expiratory wheezing at the bases.  Afebrile.  No supplemental oxygen requirement.  Patient was given a DuoNeb.  Her troponin was slightly elevated but is downtrending and she denies any chest pain and has nonischemic EKG therefore there is low suspicion for ACS.  Her metabolic panel is largely baseline.  Her BNP is 1495 which is lower compared to prior.  She has a low-grade leukocytosis of 14.  Has stable H&H.  CT angio chest for PE and CT abdomen pelvis was obtained negative for PE or thoracic aortic dissection although aneurysmal dilatation of the ascending thoracic aorta was noted as well as a descending thoracic aortic aneurysm and interstitial edema small bilateral effusions and patchy airspace density right upper lobe as well as mediastinal adenopathy and pulmonary nodule noted as read by the radiologist.  CT abdomen pelvis largely negative for acute findings as read by the radiologist.  On reevaluation the patient is resting comfortably.  Discussed hospitalization she would prefer to be discharged at this time.  She will be treated for pneumonia with Augmentin and doxycycline given first doses here.  Was given a copy of her CT results and was advised the importance for follow-up for pulmonary nodule and for aortic dilatation.  Instructed to return to the nearest ED if any concerns or new or worsening symptoms. Patient verbalized understanding and agreement with plan. Discharged in stable condition.  This visit was staffed with the attending physician Dr. Blood.      Disclaimer: This note was dictated using speech  recognition software. An attempt at proofreading was made to minimize errors. Minor errors in transcription may be present. Please call if questions.    Amount and/or Complexity of Data Reviewed  Labs: ordered.  Radiology: ordered.  ECG/medicine tests: ordered and independent interpretation performed.     Details: EKG interpreted by me: Pacemaker rhythm.  Rate 70.  LAD.  QTc 498 ms.  Artifact likely secondary to motion.  No STEMI.        Procedure  Procedures     Raphael Nj PA-C  03/21/24 8845

## 2024-03-21 NOTE — DISCHARGE INSTRUCTIONS
Follow up your chest CT results with your doctor.  You do have a prominent aorta and a mass in your lungs that needs followed up.    Seek immediate medical attention if you develop: worsening chest pain, new chest pain, nausea, vomiting, weakness, numbness, tingling, excessive sweating, shortness of breath, difficulty breathing, loss of motion in your arms or legs, or any new or worsening symptoms.

## 2024-03-25 ENCOUNTER — TELEPHONE (OUTPATIENT)
Dept: PRIMARY CARE | Facility: CLINIC | Age: 79
End: 2024-03-25
Payer: MEDICARE

## 2024-03-25 DIAGNOSIS — I48.91 ATRIAL FIBRILLATION WITH RVR (MULTI): ICD-10-CM

## 2024-03-25 RX ORDER — APIXABAN 5 MG/1
TABLET, FILM COATED ORAL 2 TIMES DAILY
Qty: 30 TABLET | Refills: 0 | Status: SHIPPED | OUTPATIENT
Start: 2024-03-25 | End: 2024-04-30 | Stop reason: SDUPTHER

## 2024-03-25 NOTE — PROGRESS NOTES
Subjective   Patient ID: Shannan Tee is a 79 y.o. female who presents for follow up hospital visit, Seen in the ED 3/21/2024 for pneumonia due to infectious organism unspecified laterality      She has pain in her chest and can not breathe very well  She is doing lamaze breathing   She was see in in UC first and given muscle relaxer   She had trouble more with breathing at night   She thought she was having a heart attack prior to going to the hospital   She is tolerating the Abx fine. She is afebrile       HEALTH:  PAP no longer needs to repeat   Mammo 1/2008, 2016, 5/19, 8/2020, 8/2021, no longer needs to repeat   BD 1/2008 T-0.9, 4/16 T-1.9, 5/19 T-2.1, 8/2021 T-2.5, 8/2023 T-3.1.   Colon 6/2008 adenoma, 5/2012 and declines repeat. Declines Cologuard   EKG 11/17, 8/18, 6/19, 9/20, 5/21, 7/21, 5/22, 8/22, 4/23, 3/2024 ED   ECHO 9/17 normal, 5/2021, 3/2023   Carotids 9/17 <50%   Urine 2017 , 1/19   Hep C 8/18 -  FLU 1/12/2023  TDAP 2013, 6/2023 with injury   Arexvy recommended and will consider   Prevnar 5/17  Pneumo 1/2011  Zostavax never and declines   Shingrix recommend and declines   SARS CVD vaccine declines   Ophth Seen in 2/2024 for corneal abrasion left eye. She had cataract extraction in 2018. She is not wearing glasses except for night driving. No glaucoma or MD.  Copy of her Advanced Directives scanned in her chart 3/2023       Review of Systems  All systems negative except those listed in the HPI       Objective   Visit Vitals  /80 (BP Location: Left arm, Patient Position: Sitting)   Pulse 84   Temp 36.3 °C (97.4 °F)    Body mass index is 19.66 kg/m².     Physical Exam  Exam conducted with a chaperone present (daughter).   Constitutional:       Appearance: Normal appearance. She is normal weight.   HENT:      Head: Normocephalic.      Right Ear: Tympanic membrane, ear canal and external ear normal.      Left Ear: Tympanic membrane, ear canal and external ear normal.      Nose: Nose normal.       Mouth/Throat:      Pharynx: Oropharynx is clear.   Eyes:      Conjunctiva/sclera: Conjunctivae normal.   Cardiovascular:      Rate and Rhythm: Normal rate and regular rhythm.      Pulses: Normal pulses.      Heart sounds: Normal heart sounds.      No friction rub.   Pulmonary:      Effort: Pulmonary effort is normal.      Breath sounds: Normal breath sounds.      Comments: Moving air well   Abdominal:      General: Bowel sounds are normal.      Palpations: Abdomen is soft.   Musculoskeletal:         General: Normal range of motion.      Cervical back: Normal range of motion and neck supple.   Skin:     General: Skin is warm.   Neurological:      General: No focal deficit present.      Mental Status: She is alert and oriented to person, place, and time.   Psychiatric:         Mood and Affect: Mood normal.         Behavior: Behavior normal.         Thought Content: Thought content normal.         Judgment: Judgment normal.       Assessment/Plan   Problem List Items Addressed This Visit    None      Hospital follow up completed  Hospital notes reviewed and medication reconciliation performed with patient   Reviewed labs and imaging from the hospital with patient   Labs ordered     She is  with 2 sons. One son passed on 6/27/2023 from cirrhosis.      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 Explained this pain is going to be long term. She will not sit for long periods,  no lifting or reaching. Recommend she sleep in a recliner to avoid lying flat.   - No mets to the bone mentioned on CT.   - Discussed gabapentin in detail along with possible side effects.   - Prescription sent in for gabapentin 100 mg 1 tablet QAM and 2 tablets QPM.   - She has prescription lidocaine patches at home. Recommend she use the Lidocaine patches 12 hours on 12 hours off. She is to use this when the pain is worse for her   - Recommend she avoid driving until healed   Discussed modification to her movements. She can walk and recline     Seen in the ED 3/21/2024 for RUL pneumonia due to infectious organism unspecified laterality:   She went to the ED for evaluation of right-sided flank and bilateral rib pain x 1 week. States the pain in her ribs is pleuritic and radiates to her sternum.   CT angio chest 3/2024 for PE and CT abdomen pelvis was obtained negative for PE or thoracic aortic dissection although aneurysmal dilatation of the ascending thoracic aorta was noted as well as a descending thoracic aortic aneurysm and interstitial edema small bilateral effusions and patchy airspace density right upper lobe as well as mediastinal adenopathy and pulmonary nodule noted. Compression fracture at T8   CT abdomen pelvis 3/2024 largely negative for acute finding  EKG 3/2024: Pacemaker rhythm. Rate 70. LAD. QTc 498 ms. Artifact likely secondary to motion. No STEMI.       Treated for pneumonia with Augmentin and doxycycline.  She has an appt with Dr Zack Haider 4/30/2024 for the aortic aneurysm and PVD.   She is scheduled for CT scan chest in 5/2024- we will see if the pneumonia is resolved      Her son did laser treatment for smoking.  He had laser treatment done 3 days ago and he is doing fine and not smoking. She can look into laser or hypnosis also a good option   Nicotine dependence I spent more than 3 minutes counseling patient about the need for smoking cessation and how I can support efforts when  patient is ready to quit. Discussed nicotine replacement therapy, Varenicline, Buproprion, hypnosis, support groups and acupuncture as potential options. Patient currently has no signs or symptoms of tobacco related disease.  (Dx code F17.2 or Z87.891)(Billing code 08648 or 39904)       Her hearing is not as good as it used to be   She declines appt with audiology for now 1/2023      Her weight/ BMI is in normal range in office, recommend she maintain  Her weight is 111 and BMI 19.66 IO 3/2024  She is not eating well but had a nice meal last night  She sometimes has emesis postprandial       HTN: Stable.   Continue atenolol 25 mg daily and hydroxyzine 25 mg TID   ECHO 5/2021 was normal but poor functional capacity   Stress test normal 5/2021  EKG 11/2022 showed a fib with RVR  Recommend she monitor her BP at home and call with elevated readings.    She saw Tracey Schwab in 8/2022 and discontinued lisinopril  She has an appt with Dr Edward in 5/2024      Acute respiratory failure and cardiac tamponade.   Admitted on 3/30/2023 and discharged on 4/4/2023  S/p percutaneous pericardiocentesis on 3/30/2023.   Approximately 400 to 500 cc of blood removed from the pericardial sac.  She presented on 3/30/2023 for elective outpatient ICD placement which was complicated by cardiac tamponade and acute hypoxic respiratory failure.   Pt initially managed in ICU with mechanical ventilation and pericardial drain placement. Pericardial drain has since been removed, repeat echo shows resolution of pericardial effusion.   She saw Dr Robles in 4/2023.   She has an appt with Dr Edward in 5/2024      Long standing atrial Fib with prior TIA in 9/2017: On exam: she is not in a- fib 3/2024. She has not been seeing anyone in cardiology.   She does not know how to work her heart monitor    Patient was cardioverted on 4/20/23 and converted to normal sinus rhythm.    EP did an implantable loop on 11/18/2022     S/p DC shock and dual chamber ICD  interrogation and reporgramming on 4/20/2023 with Dr Perez   Continue carvedilol 6.25 mg BID and amiodarone 200 mg daily  Continue spironolactone 25 mg daily and atenolol 25 mg daily  Continue Eliquis 5 mg BID and hydroxyzine 25 mg BID  US 4/2023 at bedside shows no evidence of pericardial effusion, however she does have B-lines in all lung fields consistent with pulmonary edema.    CXR 4/2023 showed pulmonary edema   EKG 4/2023: Demand pacemaker with underlying A-fib, rate of 96 bpm, QTc 477 ms. No ST elevations or depressions, no acute ischemic injury pattern. T wave inversions in lateral leads are new compared to prior EKGs.  Carotid duplex: <50% stenosis bilateral vertebra arteries patent with antegrade flow  She was on Pradaxa then Eliquis since 9/17 after she was admitted with TIA 9/17   She saw Dr Mcneill in 4/2023. Dr Robles has left   She has an appt with Dr Edward in 5/2024      Middle cerebral aneurysm right side :  She needs CT angio and has to schedule since 2 mm only      We discussed the patients cardiovascular risk. If needed, lifestyle modifications recommended including: behavioral therapies of nutrition choices, exercise and eliminate habits that are contributing to their cardiac risk. We agreed to a plan to decrease his cardiovascular risks. Discussed ASA. Reviewed Guidelines and approved recommendations made to patient.   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   Dr Pina did EBUS on 12/19/2022 showed Interval increase in size of right middle lobe mass when compared with the previous CT from 08/23/2021, concerning for neoplastic etiology. Interval increase in size of multiple mediastinal lymph nodes as detailed above, concerning for metastatic lymphadenopathy. Moderate upper lobe predominant emphysematous changes.   Pathology results 12/2022 showed malignant cells derived from SCC   -treated with SBRT (5 treatments) from 1/30 to 2/3/2022  -5/8/2023 PET showed interval decrease in size of RML mass with mild residual activity  -11/10/2023 chest CT showed interval decrease in size of subcarinal node 1.2 cm, interval decrease in size of right lower paratracheal node 0.7 cm; similar size of consolidative airspace opacity within RML 3.3 x 1.6 cm  with surrounding bandlike opacities with associated traction bronchiectasis and volume loss compatible with postradiation fibrosis  She saw Rad Onc in 8/2023   She saw Heme onc in 11/2023-rad onc has booked her next chest CT for 5/14/2024   She will see Rad onc in 5/2024 and Heme onc in 5/2024      LE edema:  Continue furosemide 40 mg daily   Monitor daily sodium intake  Elevate legs for 45 minutes in the afternoons   Elevate legs as much as possible during the day      Situational depression:    Her son passed on 6/27/2023 from cirrhosis.    She is still grieving the passing of her son in 6/2023.   She admits she has depression about her sons passing.    She is happy when she gets to see her grandchildren  She is going to restart Wellbutrin 150 mg daily for smoking cessation and that should help her depression      Insomnia:  She stopped trazodone.  She is sleeping better      Constipation:   Explained systemic medications that are contributing to her constipation   She is to avoid straining with bowel movement   Continue Mg 200 mg daily.  She can add a stool softener when needed      Neurogenic claudication BLE:   She saw   Ira and he did an US of her legs was concerning.  Aortogram 7/2021 with lower extremity angiography showed critical proximal right iliac, renal artery stenosis of about 90% or more with heavy calcifications, completely occluded left SFA throughout its length, and a focal mid right SFA stenosis of about 90% had iliac artery stenting via the right femoral approach without complication   She saw Dr Mota in 11/2021 and feels she has PAD versus peripheral neuropathy   MRI of L/S 11/2021 did not show stenosis   She saw Dr Mota in 11/2021 and EMG studies 11/2021 were normal   She restarted PT but on the 3 rd session she pulled a hamstring and has not been back. She continues to do her exercises at home   Continue cilostazol 50 mg daily   She saw Dr Winn in 2/2022 and recommend continued medical management   Recommend and orders placed for Dr Zack Das for evaluation 3/2024        Seen in the ED on 12/4/2022 for mechanical fall   She reports she fell off a stool because she missed the last step and went forward over the stool sliding across the floor. She denies hitting her head.   Patient was found to have a clavicle fracture and healing well and no issues   She will follow up with ortho       Spinal Stenosis: her back pain is chronic   She should wait on any spine surgery until pain is consistent and intolerable.   Xray L/S 8/2020 showed severe multilevel spondylosis worse at L4- 5 and mild anterolisthesis of L3 on L4 and retrolisthesis of L1 on L2   She will continue to follow with Dr Almanzar and Dr Porras   She has done PT and chiropractor in the past and declines to do more   Recommend she look into a girdle to help with posture and back pain      She has intermittent cold sores:  She is no longer taking acyclovir      Mammo in 8/2021 was normal. She declines mammo now due to extensive work up and imaging for SCC lung cancer.   She had PET/ CT in 1/2023.   Breast exam normal 1/2023  BD in  2023 was T-3.1. Reviewed the results of her BD from 2023 with patient in detail 2023. Explained she is osteoporosis stage. Discussed systemic medications to treat OP along with possible side effects. She will consider medications 2023  Continue Calcium daily and eat 2 servings of calcium enriched foods daily.   Discussed importance of weight bearing exercises.      Colonoscopy with Dr Jain in  was normal.  She knows a couple of people who  during colonoscopy and she is afraid to repeat it. She declines Cologuard because her sister had false positive results.  She declines colonoscopy and Cologuard 2021     Ophth: She is going to make an appt with ophthalmology for an eye exam 3/2024  Seen in 2024 for corneal abrasion left eye. She had cataract extraction in . She is not wearing glasses except for night driving. No glaucoma or MD.     I spent 15 min with the patient discussing their wishes for end of life choices.   We discussed the need for a Living Will and that wishes should be discussed with Family. The DNR status was reviewed, and we discussed the options of this and, the DNR _CC options as well.   We also went over how important it was to have these choices written down and clear for any surviving family so that their wishes are followed   The patient and I came to to following agreement :   She has a LW and MPOA.  Copy of her Advanced Directives scanned in her chart 3/2023     Hep C  -  FLU 2023  TDAP around , 2023 with injury   Arexvy recommended and will consider   Prevnar   Pneumo 2011  Zostavax never and declines   Shingrix recommend and declines   SARS CVD vaccine declines    Disability placard given 2023 for 5 years      Some elements in the chart were copied from Dr. Warren's last office visit with patient.   Notes have been updated where appropriate, and reflect my current medical decision making from today.       RTC in 3 weeks for follow up pneumonia or  sooner if needed   (MCR due 3/2025)     Scribe Attestation  By signing my name below, I, Jessica Blackman , Scribe   attest that this documentation has been prepared under the direction and in the presence of Sunshine Warren, MDPatient was identified as a fall risk. Risk prevention instructions provided.

## 2024-03-26 ENCOUNTER — LAB (OUTPATIENT)
Dept: LAB | Facility: LAB | Age: 79
End: 2024-03-26
Payer: MEDICARE

## 2024-03-26 ENCOUNTER — OFFICE VISIT (OUTPATIENT)
Dept: PRIMARY CARE | Facility: CLINIC | Age: 79
End: 2024-03-26
Payer: MEDICARE

## 2024-03-26 VITALS
WEIGHT: 111 LBS | TEMPERATURE: 97.4 F | DIASTOLIC BLOOD PRESSURE: 80 MMHG | OXYGEN SATURATION: 96 % | HEART RATE: 84 BPM | BODY MASS INDEX: 19.67 KG/M2 | SYSTOLIC BLOOD PRESSURE: 144 MMHG | HEIGHT: 63 IN

## 2024-03-26 DIAGNOSIS — I50.22 CHRONIC SYSTOLIC CONGESTIVE HEART FAILURE (MULTI): ICD-10-CM

## 2024-03-26 DIAGNOSIS — S22.060A COMPRESSION FRACTURE OF T8 VERTEBRA, INITIAL ENCOUNTER (MULTI): Primary | ICD-10-CM

## 2024-03-26 DIAGNOSIS — I50.43: ICD-10-CM

## 2024-03-26 DIAGNOSIS — R53.83 MALAISE AND FATIGUE: ICD-10-CM

## 2024-03-26 DIAGNOSIS — N18.31 STAGE 3A CHRONIC KIDNEY DISEASE (MULTI): ICD-10-CM

## 2024-03-26 DIAGNOSIS — I42.0 DILATED CARDIOMYOPATHY (MULTI): ICD-10-CM

## 2024-03-26 DIAGNOSIS — N18.32 STAGE 3B CHRONIC KIDNEY DISEASE (MULTI): ICD-10-CM

## 2024-03-26 DIAGNOSIS — I77.810 ASCENDING AORTA DILATION (CMS-HCC): ICD-10-CM

## 2024-03-26 DIAGNOSIS — I48.91 ATRIAL FIBRILLATION WITH RVR (MULTI): ICD-10-CM

## 2024-03-26 DIAGNOSIS — M79.604 PAIN IN BOTH LOWER EXTREMITIES: ICD-10-CM

## 2024-03-26 DIAGNOSIS — M79.605 PAIN IN BOTH LOWER EXTREMITIES: ICD-10-CM

## 2024-03-26 DIAGNOSIS — S22.060A COMPRESSION FRACTURE OF T8 VERTEBRA, INITIAL ENCOUNTER (MULTI): ICD-10-CM

## 2024-03-26 DIAGNOSIS — C34.31 MALIGNANT NEOPLASM OF LOWER LOBE OF RIGHT LUNG (MULTI): ICD-10-CM

## 2024-03-26 DIAGNOSIS — M80.00XA AGE-RELATED OSTEOPOROSIS WITH CURRENT PATHOLOGICAL FRACTURE, INITIAL ENCOUNTER: ICD-10-CM

## 2024-03-26 DIAGNOSIS — J18.9 PNEUMONIA OF RIGHT UPPER LOBE DUE TO INFECTIOUS ORGANISM: ICD-10-CM

## 2024-03-26 DIAGNOSIS — I10 BENIGN ESSENTIAL HYPERTENSION: ICD-10-CM

## 2024-03-26 DIAGNOSIS — R53.81 MALAISE AND FATIGUE: ICD-10-CM

## 2024-03-26 PROBLEM — J96.21 ACUTE AND CHRONIC RESPIRATORY FAILURE WITH HYPOXIA (MULTI): Status: RESOLVED | Noted: 2024-03-11 | Resolved: 2024-03-26

## 2024-03-26 LAB
25(OH)D3 SERPL-MCNC: 15 NG/ML (ref 30–100)
ALBUMIN SERPL BCP-MCNC: 3.7 G/DL (ref 3.4–5)
ALP SERPL-CCNC: 143 U/L (ref 33–136)
ALT SERPL W P-5'-P-CCNC: 36 U/L (ref 7–45)
ANION GAP SERPL CALC-SCNC: 15 MMOL/L (ref 10–20)
AST SERPL W P-5'-P-CCNC: 21 U/L (ref 9–39)
BASOPHILS # BLD AUTO: 0.04 X10*3/UL (ref 0–0.1)
BASOPHILS NFR BLD AUTO: 0.4 %
BILIRUB SERPL-MCNC: 0.4 MG/DL (ref 0–1.2)
BUN SERPL-MCNC: 39 MG/DL (ref 6–23)
CALCIUM SERPL-MCNC: 9.4 MG/DL (ref 8.6–10.6)
CHLORIDE SERPL-SCNC: 103 MMOL/L (ref 98–107)
CO2 SERPL-SCNC: 26 MMOL/L (ref 21–32)
CREAT SERPL-MCNC: 2.06 MG/DL (ref 0.5–1.05)
EGFRCR SERPLBLD CKD-EPI 2021: 24 ML/MIN/1.73M*2
EOSINOPHIL # BLD AUTO: 0.15 X10*3/UL (ref 0–0.4)
EOSINOPHIL NFR BLD AUTO: 1.4 %
ERYTHROCYTE [DISTWIDTH] IN BLOOD BY AUTOMATED COUNT: 15.5 % (ref 11.5–14.5)
GLUCOSE SERPL-MCNC: 148 MG/DL (ref 74–99)
HCT VFR BLD AUTO: 44.9 % (ref 36–46)
HGB BLD-MCNC: 14.3 G/DL (ref 12–16)
IMM GRANULOCYTES # BLD AUTO: 0.15 X10*3/UL (ref 0–0.5)
IMM GRANULOCYTES NFR BLD AUTO: 1.4 % (ref 0–0.9)
LYMPHOCYTES # BLD AUTO: 2.06 X10*3/UL (ref 0.8–3)
LYMPHOCYTES NFR BLD AUTO: 19.9 %
MCH RBC QN AUTO: 31.6 PG (ref 26–34)
MCHC RBC AUTO-ENTMCNC: 31.8 G/DL (ref 32–36)
MCV RBC AUTO: 99 FL (ref 80–100)
MONOCYTES # BLD AUTO: 1.09 X10*3/UL (ref 0.05–0.8)
MONOCYTES NFR BLD AUTO: 10.5 %
NEUTROPHILS # BLD AUTO: 6.86 X10*3/UL (ref 1.6–5.5)
NEUTROPHILS NFR BLD AUTO: 66.4 %
NRBC BLD-RTO: 0 /100 WBCS (ref 0–0)
PLATELET # BLD AUTO: 244 X10*3/UL (ref 150–450)
POTASSIUM SERPL-SCNC: 5.7 MMOL/L (ref 3.5–5.3)
PROT SERPL-MCNC: 7.1 G/DL (ref 6.4–8.2)
PTH-INTACT SERPL-MCNC: 128.7 PG/ML (ref 18.5–88)
RBC # BLD AUTO: 4.53 X10*6/UL (ref 4–5.2)
SODIUM SERPL-SCNC: 138 MMOL/L (ref 136–145)
WBC # BLD AUTO: 10.4 X10*3/UL (ref 4.4–11.3)

## 2024-03-26 PROCEDURE — 3077F SYST BP >= 140 MM HG: CPT | Performed by: INTERNAL MEDICINE

## 2024-03-26 PROCEDURE — 82306 VITAMIN D 25 HYDROXY: CPT

## 2024-03-26 PROCEDURE — 1123F ACP DISCUSS/DSCN MKR DOCD: CPT | Performed by: INTERNAL MEDICINE

## 2024-03-26 PROCEDURE — 80053 COMPREHEN METABOLIC PANEL: CPT

## 2024-03-26 PROCEDURE — 36415 COLL VENOUS BLD VENIPUNCTURE: CPT

## 2024-03-26 PROCEDURE — 1160F RVW MEDS BY RX/DR IN RCRD: CPT | Performed by: INTERNAL MEDICINE

## 2024-03-26 PROCEDURE — 3079F DIAST BP 80-89 MM HG: CPT | Performed by: INTERNAL MEDICINE

## 2024-03-26 PROCEDURE — 1157F ADVNC CARE PLAN IN RCRD: CPT | Performed by: INTERNAL MEDICINE

## 2024-03-26 PROCEDURE — 99215 OFFICE O/P EST HI 40 MIN: CPT | Performed by: INTERNAL MEDICINE

## 2024-03-26 PROCEDURE — 83970 ASSAY OF PARATHORMONE: CPT

## 2024-03-26 PROCEDURE — 1036F TOBACCO NON-USER: CPT | Performed by: INTERNAL MEDICINE

## 2024-03-26 PROCEDURE — 85025 COMPLETE CBC W/AUTO DIFF WBC: CPT

## 2024-03-26 PROCEDURE — 1159F MED LIST DOCD IN RCRD: CPT | Performed by: INTERNAL MEDICINE

## 2024-03-26 RX ORDER — GABAPENTIN 100 MG/1
100 CAPSULE ORAL 3 TIMES DAILY
Qty: 90 CAPSULE | Refills: 5 | Status: SHIPPED | OUTPATIENT
Start: 2024-03-26 | End: 2024-04-18 | Stop reason: SINTOL

## 2024-03-26 RX ORDER — LIDOCAINE 50 MG/G
1 PATCH TOPICAL DAILY
Qty: 30 PATCH | Refills: 2 | Status: SHIPPED | OUTPATIENT
Start: 2024-03-26 | End: 2025-03-26

## 2024-03-26 ASSESSMENT — PATIENT HEALTH QUESTIONNAIRE - PHQ9
1. LITTLE INTEREST OR PLEASURE IN DOING THINGS: NOT AT ALL
2. FEELING DOWN, DEPRESSED OR HOPELESS: NOT AT ALL
SUM OF ALL RESPONSES TO PHQ9 QUESTIONS 1 AND 2: 0

## 2024-03-27 DIAGNOSIS — R73.09 ELEVATED GLUCOSE: ICD-10-CM

## 2024-03-27 DIAGNOSIS — N99.0: Primary | ICD-10-CM

## 2024-03-27 NOTE — RESULT ENCOUNTER NOTE
Reji Bañuelos-  As expected the kidney function is worse after the CT dye and the potassium is higher than would like so avoid all potassium rich foods and hold the SPIRONOLACTONE for now and would like to repeat the labs in 1 week   The Vitamin D  is really low and that with the kidney decline is elevating the Parathyroid gland so need to take Vitamin D 3 5000 u a day for now with food and will repeat in a few months   ORDERS ARE IN for next week

## 2024-03-31 LAB
ATRIAL RATE: 70 BPM
P OFFSET: 140 MS
P ONSET: 108 MS
PR INTERVAL: 228 MS
Q ONSET: 222 MS
QRS COUNT: 12 BEATS
QRS DURATION: 98 MS
QT INTERVAL: 462 MS
QTC CALCULATION(BAZETT): 498 MS
QTC FREDERICIA: 486 MS
R AXIS: -22 DEGREES
T AXIS: 6 DEGREES
T OFFSET: 453 MS
VENTRICULAR RATE: 70 BPM

## 2024-04-02 ENCOUNTER — TELEPHONE (OUTPATIENT)
Dept: PRIMARY CARE | Facility: CLINIC | Age: 79
End: 2024-04-02
Payer: MEDICARE

## 2024-04-02 NOTE — TELEPHONE ENCOUNTER
"Patient started a new medication last week she advised she has been having \"jerking motions\" with her hands. She also fell in her home today which is not normal for her she thinks this is due to the medicine was unable to say what medicine she was referring to     [phone]  "

## 2024-04-03 ENCOUNTER — LAB (OUTPATIENT)
Dept: LAB | Facility: LAB | Age: 79
End: 2024-04-03
Payer: MEDICARE

## 2024-04-03 DIAGNOSIS — N99.0: ICD-10-CM

## 2024-04-03 DIAGNOSIS — R73.09 ELEVATED GLUCOSE: ICD-10-CM

## 2024-04-03 LAB
ANION GAP SERPL CALC-SCNC: 14 MMOL/L (ref 10–20)
BUN SERPL-MCNC: 29 MG/DL (ref 6–23)
CALCIUM SERPL-MCNC: 9 MG/DL (ref 8.6–10.6)
CHLORIDE SERPL-SCNC: 102 MMOL/L (ref 98–107)
CO2 SERPL-SCNC: 25 MMOL/L (ref 21–32)
CREAT SERPL-MCNC: 1.64 MG/DL (ref 0.5–1.05)
EGFRCR SERPLBLD CKD-EPI 2021: 32 ML/MIN/1.73M*2
EST. AVERAGE GLUCOSE BLD GHB EST-MCNC: 108 MG/DL
GLUCOSE SERPL-MCNC: 103 MG/DL (ref 74–99)
HBA1C MFR BLD: 5.4 %
POTASSIUM SERPL-SCNC: 4.8 MMOL/L (ref 3.5–5.3)
SODIUM SERPL-SCNC: 136 MMOL/L (ref 136–145)
URATE SERPL-MCNC: 4.6 MG/DL (ref 2.3–6.7)

## 2024-04-03 PROCEDURE — 80048 BASIC METABOLIC PNL TOTAL CA: CPT

## 2024-04-03 PROCEDURE — 83036 HEMOGLOBIN GLYCOSYLATED A1C: CPT

## 2024-04-03 PROCEDURE — 36415 COLL VENOUS BLD VENIPUNCTURE: CPT

## 2024-04-03 PROCEDURE — 84550 ASSAY OF BLOOD/URIC ACID: CPT

## 2024-04-03 NOTE — RESULT ENCOUNTER NOTE
Reji Bañuelos-  The kidney function is improved   The uric acid and A1c were normal   Rest of the labs are good range

## 2024-04-03 NOTE — TELEPHONE ENCOUNTER
Talked with pt   She has felt weak and shaky  inside since starting the Gabapentin   Will stop for now and see if symptom resolve   No injury with the fall since more of a sit down bt needs ot watch carefully now for the next 2 day to make sure not fall again

## 2024-04-18 ENCOUNTER — OFFICE VISIT (OUTPATIENT)
Dept: PRIMARY CARE | Facility: CLINIC | Age: 79
End: 2024-04-18
Payer: MEDICARE

## 2024-04-18 VITALS
WEIGHT: 109 LBS | TEMPERATURE: 97 F | BODY MASS INDEX: 19.31 KG/M2 | OXYGEN SATURATION: 96 % | HEART RATE: 74 BPM | DIASTOLIC BLOOD PRESSURE: 60 MMHG | SYSTOLIC BLOOD PRESSURE: 90 MMHG

## 2024-04-18 DIAGNOSIS — I50.22 CHRONIC SYSTOLIC CONGESTIVE HEART FAILURE (MULTI): ICD-10-CM

## 2024-04-18 DIAGNOSIS — I42.0 DILATED CARDIOMYOPATHY (MULTI): ICD-10-CM

## 2024-04-18 DIAGNOSIS — I73.9 PAD (PERIPHERAL ARTERY DISEASE) (CMS-HCC): ICD-10-CM

## 2024-04-18 DIAGNOSIS — E55.9 VITAMIN D DEFICIENCY: ICD-10-CM

## 2024-04-18 DIAGNOSIS — C34.31 MALIGNANT NEOPLASM OF LOWER LOBE OF RIGHT LUNG (MULTI): ICD-10-CM

## 2024-04-18 DIAGNOSIS — M80.00XA AGE-RELATED OSTEOPOROSIS WITH CURRENT PATHOLOGICAL FRACTURE, INITIAL ENCOUNTER: ICD-10-CM

## 2024-04-18 DIAGNOSIS — S22.060D COMPRESSION FRACTURE OF T8 VERTEBRA WITH ROUTINE HEALING, SUBSEQUENT ENCOUNTER: Primary | ICD-10-CM

## 2024-04-18 DIAGNOSIS — I10 BENIGN ESSENTIAL HYPERTENSION: ICD-10-CM

## 2024-04-18 DIAGNOSIS — I48.91 ATRIAL FIBRILLATION WITH RVR (MULTI): ICD-10-CM

## 2024-04-18 PROCEDURE — 1036F TOBACCO NON-USER: CPT | Performed by: INTERNAL MEDICINE

## 2024-04-18 PROCEDURE — 1160F RVW MEDS BY RX/DR IN RCRD: CPT | Performed by: INTERNAL MEDICINE

## 2024-04-18 PROCEDURE — G2211 COMPLEX E/M VISIT ADD ON: HCPCS | Performed by: INTERNAL MEDICINE

## 2024-04-18 PROCEDURE — 99214 OFFICE O/P EST MOD 30 MIN: CPT | Performed by: INTERNAL MEDICINE

## 2024-04-18 PROCEDURE — 3078F DIAST BP <80 MM HG: CPT | Performed by: INTERNAL MEDICINE

## 2024-04-18 PROCEDURE — 3074F SYST BP LT 130 MM HG: CPT | Performed by: INTERNAL MEDICINE

## 2024-04-18 PROCEDURE — 1157F ADVNC CARE PLAN IN RCRD: CPT | Performed by: INTERNAL MEDICINE

## 2024-04-18 PROCEDURE — 1123F ACP DISCUSS/DSCN MKR DOCD: CPT | Performed by: INTERNAL MEDICINE

## 2024-04-18 PROCEDURE — 1159F MED LIST DOCD IN RCRD: CPT | Performed by: INTERNAL MEDICINE

## 2024-04-18 RX ORDER — ALENDRONATE SODIUM 70 MG/1
70 TABLET ORAL
Qty: 4 TABLET | Refills: 11 | Status: SHIPPED | OUTPATIENT
Start: 2024-04-18 | End: 2024-05-09 | Stop reason: WASHOUT

## 2024-04-18 RX ORDER — ERGOCALCIFEROL 1.25 MG/1
50000 CAPSULE ORAL
Qty: 12 CAPSULE | Refills: 3 | Status: SHIPPED | OUTPATIENT
Start: 2024-04-21 | End: 2025-03-23

## 2024-04-18 NOTE — PROGRESS NOTES
Subjective   Patient ID: Shannan Tee is a 79 y.o. female who presents for follow up multiple medical conditions and hospital follow up, Seen in the ED on 3/21/2024 for pneumonia      She has pain in the chest a lot from the T8 compression fracture.  She has no radiating pain in her legs  Her breathing is fine.  She is not in a- fib   She is mostly sitting at home. She is trying not to pick anything up   She would like to be able to drive   She is staying well hydrated.  She is not hungry       HEALTH:  PAP no longer needs to repeat   Mammo 1/08, 2016, 5/19, 8/20, 8/21, no longer needs to repeat   BD 1/08 T-0.9, 4/16 T-1.9, 5/19 T-2.1, 8/21 T-2.5, 8/23 T-3.1.   Colon 6/08 adenoma, 5/12 and declines repeat. Declines Cologuard   EKG 11/17, 8/18, 6/19, 9/20, 5/21, 7/21, 5/22, 8/22, 4/23, 3/24 ED   ECHO 9/17, 5/2021, 3/2023   Carotids 9/17 <50%   Urine 2017 , 1/19   Hep C 8/18 -  FLU 1/23  TDAP 2013, 6/2023 with injury   Arexvy recommended and will consider   Prevnar 5/17  Pneumo 1/2011  Shingrix recommend and declines   SARS CVD vaccine declines   Ophth Seen in 2/2024 for corneal abrasion left eye. She had cataract extraction in 2018. She is not wearing glasses except for night driving. No glaucoma or MD.  Copy of her Advanced Directives scanned in her chart 3/2023       Review of Systems  All systems negative except those listed in the HPI       Objective   Visit Vitals  BP 90/60 (BP Location: Left arm, Patient Position: Sitting)   Pulse 74   Temp 36.1 °C (97 °F) (Temporal)    Body mass index is 19.31 kg/m².     Physical Exam  Vitals reviewed. Exam conducted with a chaperone present (daughter).   Constitutional:       Appearance: Normal appearance. She is normal weight.   HENT:      Head: Normocephalic.      Right Ear: Tympanic membrane, ear canal and external ear normal.      Left Ear: Tympanic membrane, ear canal and external ear normal.      Nose: Nose normal.      Mouth/Throat:      Pharynx: Oropharynx is clear.    Eyes:      Conjunctiva/sclera: Conjunctivae normal.   Cardiovascular:      Rate and Rhythm: Normal rate and regular rhythm.      Pulses: Normal pulses.      Heart sounds: Normal heart sounds.      Comments: she is not in a- fib  Pulmonary:      Effort: Pulmonary effort is normal.      Breath sounds: Normal breath sounds.   Abdominal:      General: Bowel sounds are normal.      Palpations: Abdomen is soft.   Musculoskeletal:         General: Normal range of motion.      Cervical back: Normal range of motion and neck supple.   Skin:     General: Skin is warm.   Neurological:      General: No focal deficit present.      Mental Status: She is alert and oriented to person, place, and time.   Psychiatric:         Mood and Affect: Mood normal.         Behavior: Behavior normal.         Thought Content: Thought content normal.         Judgment: Judgment normal.       Assessment/Plan   Problem List Items Addressed This Visit       Atrial fibrillation with RVR (Multi)    Benign essential hypertension    Chronic systolic congestive heart failure (Multi)    Dilated cardiomyopathy (Multi)    Lung cancer (Multi)    Osteoporosis    Relevant Medications    alendronate (Fosamax) 70 mg tablet    PAD (peripheral artery disease) (CMS-HCC)     Other Visit Diagnoses       Compression fracture of T8 vertebra with routine healing, subsequent encounter    -  Primary    Relevant Medications    ergocalciferol (Vitamin D-2) 1.25 MG (64933 UT) capsule (Start on 4/21/2024)    alendronate (Fosamax) 70 mg tablet    Vitamin D deficiency        Relevant Medications    ergocalciferol (Vitamin D-2) 1.25 MG (08816 UT) capsule (Start on 4/21/2024)            Hospital follow up and follow up completed  Hospital notes reviewed and medication reconciliation performed with patient   Reviewed labs and imaging from the hospital with patient     She is  with 2 sons. Son passed on 6/27/2023 from cirrhosis.      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  She has no radiating pain in her legs  Her breathing is fine.   She is mostly sitting at home. She is trying not to pick anything up   -- Recommend she not drive at this time.     Seen in the ED on 3/21/2024 for pneumonia and T8 compression fracture : On exam: tenderness when turning to the left   Has bilateral expiratory wheezing at the bases. Afebrile. No supplemental oxygen requirement. Patient was given a DuoNeb.   Her troponin was slightly elevated but is downtrending   She has nonischemic EKG therefore there is low suspicion for ACS.   CT angio chest for PE and CT abdomen pelvis was obtained negative for PE or thoracic aortic dissection although aneurysmal dilatation of the ascending thoracic aorta was noted as well as a descending thoracic aortic aneurysm and interstitial edema small bilateral effusions and patchy airspace density right upper lobe as well as mediastinal adenopathy and pulmonary nodule noted   CT abdomen pelvis largely negative for acute findings   She will be treated for pneumonia with Augmentin and doxycycline given first doses here. Was given a copy of her CT results and was advised the importance for follow-up for pulmonary nodule and for aortic dilatation.   She has an appt with Dr Zack Das on 4/30/2024  She has an appt with Dr Edward on 5/17/2024   She has an appt with Dr Pyle 5/15/2024   She has an appt with Dr Gilbert 5/28/2024       CKD: Cr 1.6 on labs in 3/2024.   Recommend she avoid all NSAIDs   We will continue to monitor     Hyperparathyroid: .7 on labs in 3/2024  Recommend she begin calcium 600 mg BID     Vitamin D def: levels 15 on labs in 3/2024  Prescription sent in for scripted Vitamin D 50K UT daily     BD in 8/2023 was T-3.1. Explained she is osteoporosis stage.   Recommend she  begin OTC Ca 600 mg BID, scripted Vitamin D 50 K UT weekly and eat 2 servings of calcium enriched foods daily.   Discussed importance of weight bearing exercises.    Prescription sent in for Fosamax 70 mg weekly, possible side effects discussed with medication. Explained the proper way to take Fosamax. She is aware I want her to get in 4 doses of the scripted Vitamin D before beginning the Fosamax. Recommend she let her dentist know she is going to start Fosamax and see if there are any dental issues that need to be taken care of first      She is going to begin taking Wellbutrin again. She is smoking a pack every 4- 5 days  Her son did laser treatment for smoking.   She can look into laser or hypnosis also a good option   Nicotine dependence I spent more than 3 minutes counseling patient about the need for smoking cessation and how I can support efforts when patient is ready to quit. Discussed nicotine replacement therapy, Varenicline, Buproprion, hypnosis, support groups and acupuncture as potential options. Patient currently has no signs or symptoms of tobacco related disease.  (Dx code F17.2 or Z87.891)(Billing code 41119 or 37633)        Her hearing is not as good as it used to be   She declines appt with audiology for now 1/2023      Her weight/ BMI is in normal range in office, recommend she maintain  Her weight is 109 and BMI 19.31 IO 4/2024  She is not eating well but had a nice meal last night  She sometimes has emesis postprandial       HTN: BP low normal. She is staying well hydrated. Recommend when she stand she count to 10 prior to taking her first steps. Recommend increased sodium in her diet   Continue atenolol 25 mg daily and hydroxyzine 25 mg TID   ECHO 5/2021 was normal but poor functional capacity   Stress test normal 5/2021  EKG 11/2022 showed a fib with RVR  Recommend she monitor her BP at home and call with elevated readings.    She saw Tracey Schwab in 8/2022 and discontinued lisinopril  She  saw Dr Robles in 4/2023. Dr Robles is leaving and she has to find a new cardiologist. Recommend seeing Dr Edward, Dr Cohen or Dr Perez   Recommend and orders placed for cardiology 3/2024- recommend Dr Edward    She has an appt with Dr Edward on 5/17/2024       Acute respiratory failure and cardiac tamponade.   Admitted on 3/30/2023 and discharged on 4/4/2023  S/p percutaneous pericardiocentesis on 3/30/2023.   Approximately 400 to 500 cc of blood removed from the pericardial sac.  She presented on 3/30/2023 for elective outpatient ICD placement which was complicated by cardiac tamponade and acute hypoxic respiratory failure.   She saw Dr Robles in 4/2023. Recommend seeing Dr Edward, Dr Cohen or Dr Perez    Recommend and orders placed for cardiology 3/2024- recommend Dr Edward    She has an appt with Dr Edward on 5/17/2024      Long standing atrial Fib with prior TIA in 9/2017: On exam: she is not in a- fib 4/24  EP did an implantable loop on 11/18/2022     - S/p DC shock and dual chamber ICD interrogation and reporgramming on 4/20/2023 with Dr Perez   Continue carvedilol 6.25 mg BID and amiodarone 200 mg daily  Continue spironolactone 25 mg daily and atenolol 25 mg daily  Continue Eliquis 5 mg BID and hydroxyzine 25 mg BID  EKG 4/2023: Demand pacemaker with underlying A-fib, rate of 96 bpm, QTc 477 ms. No ST elevations or depressions, no acute ischemic injury pattern. T wave inversions in lateral leads are new compared to prior EKGs.  Carotid duplex: <50% stenosis bilateral vertebra arteries patent with antegrade flow  She was on Pradaxa then Eliquis since 9/17 after she was admitted with TIA 9/17   She saw Dr Mcneill in 4/2023. Dr Robles has left   Recommend and orders placed for cardiac electrophysiology for further evaluation 3/2024. Recommend and orders placed for cardiology 3/2024- recommend Dr Edward   She has an appt with Dr Edward on 5/17/2024       Middle cerebral aneurysm right side :  She needs CT angio and  has to schedule since 2 mm only      We discussed the patients cardiovascular risk. If needed, lifestyle modifications recommended including: behavioral therapies of nutrition choices, exercise and eliminate habits that are contributing to their cardiac risk. We agreed to a plan to decrease his cardiovascular risks. Discussed ASA. Reviewed Guidelines and approved recommendations made to patient.   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   Dr Pina did EBUS on 12/19/2022 showed Interval increase in size of right middle lobe mass when compared with the previous CT from 08/23/2021, concerning for neoplastic etiology. Interval increase in size of multiple mediastinal lymph nodes as detailed above, concerning for metastatic lymphadenopathy. Moderate upper lobe predominant emphysematous changes.   Pathology results 12/2022 showed malignant cells derived from SCC   -treated with SBRT (5 treatments) from 1/30 to 2/3/2022  -5/8/2023 PET showed interval decrease in size of RML mass with mild residual activity  -11/10/2023 chest CT showed interval decrease in size of subcarinal node 1.2 cm, interval decrease in size of right lower paratracheal node 0.7 cm; similar size of consolidative airspace opacity within RML 3.3 x 1.6 cm  with surrounding bandlike opacities with associated traction bronchiectasis and volume loss compatible with postradiation fibrosis  She saw Rad Onc in 8/2023   She saw Heme onc in 11/2023-rad onc has booked her next  chest CT for 5/14/2024   She has an appt with Dr Pyle 5/15/2024   She has an appt with Dr Gilbert 5/28/2024      LE edema:  Continue furosemide 40 mg daily   Monitor daily sodium intake  Elevate legs for 45 minutes in the afternoons   Elevate legs as much as possible during the day      Situational depression:  She is still grieving the loss of her son less than a year ago.    Her son passed on 6/27/2023 from cirrhosis.    She is still grieving the passing of her son in 6/2023.   She admits she has depression about her sons passing.    She is happy when she gets to see her grandchildren  She is going to restart Wellbutrin 150 mg daily for smoking cessation and that should help her depression      Insomnia:  She stopped trazodone.  She is sleeping better      Constipation:   Explained systemic medications that are contributing to her constipation   She is to avoid straining with bowel movement   Continue Mg 200 mg daily.  She can add a stool softener when needed      Neurogenic claudication BLE: she is not walking much due to the pain in her legs   She saw Dr Roth and he did an US of her legs and it was concerning.  Aortogram 7/2021 with lower extremity angiography showed critical proximal right iliac, renal artery stenosis of about 90% or more with heavy calcifications, completely occluded left SFA throughout its length, and a focal mid right SFA stenosis of about 90% had iliac artery stenting via the right femoral approach without complication   MRI of L/S 11/2021 did not show stenosis   EMG studies 11/2021 were normal   She restarted PT but on the 3 rd session she pulled a hamstring    She failed gabapentin   Continue cilostazol 50 mg daily  She saw Dr Mota in 11/2021 and feels she has PAD versus peripheral neuropathy     She saw Dr Winn in 2/2022 and recommend continued medical management   Recommend and orders placed for Dr Zack Das for evaluation 3/2024   She has an appt with Dr Dixon  -Thiago on 2024        Seen in the ED on 2022 for mechanical fall   She reports she fell off a stool because she missed the last step and went forward over the stool sliding across the floor. She denies hitting her head.   Patient was found to have a clavicle fracture and healing well and no issues   Continue follow up with ortho       Spinal Stenosis: her back pain is chronic   She should wait on any spine surgery until pain is consistent and intolerable.   Xray L/S 2020 showed severe multilevel spondylosis worse at L4- 5 and mild anterolisthesis of L3 on L4 and retrolisthesis of L1 on L2   She will continue to follow with Dr Almanzar and Dr Porras   She has done PT and chiropractor in the past and declines to do more   Recommend she look into a girdle to help with posture and back pain      She has intermittent cold sores:  She is no longer taking acyclovir      Mammo in 2021 was normal. She declines mammo now due to extensive work up and imaging for SCC lung cancer.   She had PET/ CT in 2023.   Breast exam normal 2023     Colonoscopy with Dr Jain in  was normal.  She knows a couple of people who  during colonoscopy and she is afraid to repeat it. She declines Cologuard because her sister had false positive results.  She declines colonoscopy and Cologuard 2021     Ophth: She is going to make an appt with ophthalmology for an eye exam 3/2024  Seen in 2024 for corneal abrasion left eye. She had cataract extraction in 2018. She is not wearing glasses except for night driving. No glaucoma or MD.     She has a LW and MPOA.  Copy of her Advanced Directives scanned in her chart 3/2023     Hep C  -  FLU 2023  TDAP around , 2023 with injury   Arexvy recommended and will consider   Prevnar   Pneumo 2011  Zostavax never and declines   Shingrix recommend and declines   SARS CVD vaccine declines    Disability placard given 2023 for 5 years      Some elements in the  chart were copied from Dr. Warren's last office visit with patient.   Notes have been updated where appropriate, and reflect my current medical decision making from today.       RTC in 6/2024 for follow up or sooner if needed   (MCR due 3/2025)     Scribe Attestation  By signing my name below, I, Jessica Blackman , Scribe   attest that this documentation has been prepared under the direction and in the presence of Sunshine Warren, MDPatient was identified as a fall risk. Risk prevention instructions provided.

## 2024-04-30 ENCOUNTER — CONSULT (OUTPATIENT)
Dept: CARDIOLOGY | Facility: CLINIC | Age: 79
End: 2024-04-30
Payer: MEDICARE

## 2024-04-30 VITALS
HEIGHT: 63 IN | HEART RATE: 63 BPM | OXYGEN SATURATION: 96 % | DIASTOLIC BLOOD PRESSURE: 78 MMHG | SYSTOLIC BLOOD PRESSURE: 110 MMHG | WEIGHT: 108 LBS | BODY MASS INDEX: 19.14 KG/M2

## 2024-04-30 DIAGNOSIS — I50.22 CHRONIC SYSTOLIC CONGESTIVE HEART FAILURE (MULTI): Primary | ICD-10-CM

## 2024-04-30 DIAGNOSIS — E78.5 DYSLIPIDEMIA: ICD-10-CM

## 2024-04-30 DIAGNOSIS — I48.91 ATRIAL FIBRILLATION WITH RVR (MULTI): ICD-10-CM

## 2024-04-30 DIAGNOSIS — I73.9 PAD (PERIPHERAL ARTERY DISEASE) (CMS-HCC): ICD-10-CM

## 2024-04-30 DIAGNOSIS — I42.0 DILATED CARDIOMYOPATHY (MULTI): ICD-10-CM

## 2024-04-30 DIAGNOSIS — I10 BENIGN ESSENTIAL HYPERTENSION: ICD-10-CM

## 2024-04-30 PROCEDURE — 99214 OFFICE O/P EST MOD 30 MIN: CPT | Performed by: STUDENT IN AN ORGANIZED HEALTH CARE EDUCATION/TRAINING PROGRAM

## 2024-04-30 NOTE — PROGRESS NOTES
Referred by Dr. Warren, Sunshine ECKERT MD  Chief complaint:   Chief Complaint   Patient presents with    Consult     Former Dr Robles pt. PAD        History of Present Illness  Edda Tee is a 79 y.o. year old female with history of lung cancer, atrial fibrillation on amiodarone and Eliquis, nonischemic cardiomyopathy HFrEF LVEF 25%, status post ICD, moderate coronary artery disease, COPD, PAD status post prior iliac and SFA stents who is presenting for cardiovascular follow-up  Patient reports to be overall doing well.  She denieschest pain, no shortness of breath, no palpitations, or syncope. Does have some mild dizziness.  Feels like legs give out sometimes. Denies thigh or calf claudication, but does get pain in her feet with ambulation. Able to walk 1-2 blocks, 15 mins at slow pace before getting pain in both feet and needing to stop.      Social History     Tobacco Use    Smoking status: Some Days     Types: Cigarettes    Smokeless tobacco: Never   Vaping Use    Vaping status: Never Used   Substance Use Topics    Alcohol use: Never    Drug use: Never       Outpatient Medications:  Current Outpatient Medications   Medication Instructions    alendronate (FOSAMAX) 70 mg, oral, Every 7 days, Take in the morning with a full glass of water, on an empty stomach, and do not take anything else by mouth or lie down for the next 30 min.    amiodarone (Pacerone) 200 mg tablet TAKE ONE TABLET BY MOUTH EVERY DAY    buPROPion XL (WELLBUTRIN XL) 150 mg, oral, Every morning, Do not crush, chew, or split.    carvedilol (COREG) 3.125 mg, oral, 2 times daily with meals    Eliquis 5 mg tablet oral, 2 times daily    ergocalciferol (VITAMIN D-2) 50,000 Units, oral, Once Weekly    hydrOXYzine HCL (ATARAX) 25 mg, oral, 2 times daily    lidocaine (Lidoderm) 5 % patch 1 patch, transdermal, Daily, Apply to painful area 12 hours per day, remove for 12 hours.    MAGNESIUM GLUCONATE ORAL 1 tablet, oral, Daily, 500 MG Oral Tablet     prednisoLONE acetate (Pred-Forte) 1 % ophthalmic suspension 1 drop, Left Eye, 4 times daily    spironolactone (ALDACTONE) 12.5 mg, oral, Daily    vit C/E/Zn/coppr/lutein/zeaxan (PRESERVISION AREDS-2 ORAL) 1 capsule, oral, Daily    vitamin E 400 Units, oral, Daily         Vitals:  Vitals:    04/30/24 1255   BP: 110/78   Pulse: 63   SpO2: 96%       Physical Exam:  General: NAD, well-appearing, elderly  HEENT: moist mucous membranes, no jaundice  Neck: No JVD, no carotid bruit  Lungs: CTA kathryn, no wheezing or rales  Cardiac: RRR, no murmurs  Abdomen: soft, non-tender, non-distended  Extremities: 2+ radial pulses, no edema, no wounds, nonpalpable pedal pulses  Skin: warm, dry  Neurologic: AAOx3,  no focal deficits      Assessment/Plan       # Nonischemic cardiomyopathy  -Previously assessed as LVEF 20 to 25%, but may be related to tachycardias cardiomyopathy.  -She is currently euvolemic on exam.  -Continue GDMT with carvedilol 3.125 mg twice daily and Aldactone  -Will obtain echocardiogram to reevaluate LVEF at this moment    # Coronary artery disease  -Prior left heart cath with moderate CAD in the RCA, to be managed medically  -Patient is asymptomatic.  Patient is currently on Eliquis.  Will add statin therapy    # Atrial fibrillation  -On reduced dose Eliquis 2.5 mg twice daily  -She is scheduled to follow-up with EP    # Peripheral artery disease  -With history of prior lower extremity interventions.  She reports pain in her feet, but no significant thigh or calf claudication symptoms  -Prior MACKENZIE/TBI reviewed with evidence of  disease bilaterally.  Will continue to manage medically in the absence of foot wounds or rest pain.  Discussed with patient importance of regular footcare and follow-up with podiatry      Lety Dixon MD [facility]  Interventional Cardiology  Endovascular Interventions  [email or web address]    **Disclaimer: This note was dictated by speech recognition, and every  effort has been made to prevent any error in transcription, however minor errors may be present**

## 2024-05-02 DIAGNOSIS — L29.9 ITCHING: ICD-10-CM

## 2024-05-02 DIAGNOSIS — I48.91 ATRIAL FIBRILLATION WITH RVR (MULTI): ICD-10-CM

## 2024-05-02 RX ORDER — APIXABAN 5 MG/1
TABLET, FILM COATED ORAL 2 TIMES DAILY
Qty: 30 TABLET | Refills: 0 | Status: SHIPPED | OUTPATIENT
Start: 2024-05-02 | End: 2024-06-03

## 2024-05-02 RX ORDER — HYDROXYZINE HYDROCHLORIDE 25 MG/1
25 TABLET, FILM COATED ORAL 2 TIMES DAILY
Qty: 90 TABLET | Refills: 0 | Status: SHIPPED | OUTPATIENT
Start: 2024-05-02

## 2024-05-09 ENCOUNTER — OFFICE VISIT (OUTPATIENT)
Dept: CARDIOLOGY | Facility: CLINIC | Age: 79
End: 2024-05-09
Payer: MEDICARE

## 2024-05-09 VITALS
HEART RATE: 69 BPM | BODY MASS INDEX: 19.49 KG/M2 | HEIGHT: 63 IN | DIASTOLIC BLOOD PRESSURE: 78 MMHG | SYSTOLIC BLOOD PRESSURE: 116 MMHG | WEIGHT: 110 LBS

## 2024-05-09 DIAGNOSIS — I42.0 DILATED CARDIOMYOPATHY (MULTI): Primary | ICD-10-CM

## 2024-05-09 DIAGNOSIS — I50.22 CHRONIC SYSTOLIC CONGESTIVE HEART FAILURE (MULTI): ICD-10-CM

## 2024-05-09 DIAGNOSIS — I10 BENIGN ESSENTIAL HYPERTENSION: ICD-10-CM

## 2024-05-09 DIAGNOSIS — I48.91 ATRIAL FIBRILLATION WITH RVR (MULTI): ICD-10-CM

## 2024-05-09 DIAGNOSIS — I48.0 PAROXYSMAL A-FIB (MULTI): ICD-10-CM

## 2024-05-09 DIAGNOSIS — Z95.810 ICD (IMPLANTABLE CARDIOVERTER-DEFIBRILLATOR) IN PLACE: ICD-10-CM

## 2024-05-09 PROCEDURE — 1160F RVW MEDS BY RX/DR IN RCRD: CPT | Performed by: STUDENT IN AN ORGANIZED HEALTH CARE EDUCATION/TRAINING PROGRAM

## 2024-05-09 PROCEDURE — 1123F ACP DISCUSS/DSCN MKR DOCD: CPT | Performed by: STUDENT IN AN ORGANIZED HEALTH CARE EDUCATION/TRAINING PROGRAM

## 2024-05-09 PROCEDURE — 3078F DIAST BP <80 MM HG: CPT | Performed by: STUDENT IN AN ORGANIZED HEALTH CARE EDUCATION/TRAINING PROGRAM

## 2024-05-09 PROCEDURE — 3074F SYST BP LT 130 MM HG: CPT | Performed by: STUDENT IN AN ORGANIZED HEALTH CARE EDUCATION/TRAINING PROGRAM

## 2024-05-09 PROCEDURE — 1157F ADVNC CARE PLAN IN RCRD: CPT | Performed by: STUDENT IN AN ORGANIZED HEALTH CARE EDUCATION/TRAINING PROGRAM

## 2024-05-09 PROCEDURE — 1159F MED LIST DOCD IN RCRD: CPT | Performed by: STUDENT IN AN ORGANIZED HEALTH CARE EDUCATION/TRAINING PROGRAM

## 2024-05-09 PROCEDURE — 93000 ELECTROCARDIOGRAM COMPLETE: CPT | Performed by: STUDENT IN AN ORGANIZED HEALTH CARE EDUCATION/TRAINING PROGRAM

## 2024-05-09 PROCEDURE — 99214 OFFICE O/P EST MOD 30 MIN: CPT | Performed by: STUDENT IN AN ORGANIZED HEALTH CARE EDUCATION/TRAINING PROGRAM

## 2024-05-09 NOTE — PROGRESS NOTES
"    Cardiac Electrophysiology Office Visit     Referred by Dr. Warren, Sunshine ECKERT MD for   Chief Complaint   Patient presents with    Establish Care    Atrial Fibrillation    Cardiomyopathy     HPI:  Edda Tee is a 79 y.o. year old female patient with h/o (R) Squamous lung CA s/p Radx, pAF, HTN, HFrEF s/p ICD (MDT 2023), TIA, CAD (no h/o PCI) presenting today to establish care    Objective  Current Outpatient Medications   Medication Instructions    amiodarone (Pacerone) 200 mg tablet TAKE ONE TABLET BY MOUTH EVERY DAY    apixaban (ELIQUIS) 2.5 mg, oral, 2 times daily    carvedilol (COREG) 3.125 mg, oral, 2 times daily with meals    Eliquis 5 mg tablet oral, 2 times daily    ergocalciferol (VITAMIN D-2) 50,000 Units, oral, Once Weekly    hydrOXYzine HCL (ATARAX) 25 mg, oral, 2 times daily    lidocaine (Lidoderm) 5 % patch 1 patch, transdermal, Daily, Apply to painful area 12 hours per day, remove for 12 hours.    MAGNESIUM GLUCONATE ORAL 1 tablet, oral, Daily, 500 MG Oral Tablet    prednisoLONE acetate (Pred-Forte) 1 % ophthalmic suspension 1 drop, Left Eye, 4 times daily    spironolactone (ALDACTONE) 12.5 mg, oral, Daily    vit C/E/Zn/coppr/lutein/zeaxan (PRESERVISION AREDS-2 ORAL) 1 capsule, oral, Daily    vitamin E 400 Units, oral, Daily         Visit Vitals  /78 (BP Location: Right arm, Patient Position: Sitting)   Pulse 69   Ht 1.6 m (5' 3\")   Wt 49.9 kg (110 lb)   BMI 19.49 kg/m²   Smoking Status Some Days   BSA 1.49 m²      Physical Exam  Constitutional:       Appearance: Normal appearance.   HENT:      Head: Normocephalic.   Eyes:      Pupils: Pupils are equal, round, and reactive to light.   Cardiovascular:      Rate and Rhythm: Normal rate and regular rhythm.      Pulses: Normal pulses.      Comments: left sided implant healed well, no ecchymosis, hematoma or drainage noted  Pulmonary:      Effort: Pulmonary effort is normal. No respiratory distress.      Breath sounds: Normal breath sounds. "   Skin:     General: Skin is warm and dry.      Capillary Refill: Capillary refill takes less than 2 seconds.   Neurological:      Mental Status: She is alert.           My Interpretation of Reviewed Study(s):  Transesophageal echo (April 2023): Mildly reduced LV function with an EF of 45% mildly noted left atrium no PFO.  Mild MR.  No pericardial effusion  [id number] Score  Age >= 75: 2   Sex Female: 1   CHF History Yes: 1   HTN Yes: 1   Stroke/TIA/Thromboembolism Yes: 2   Vascular Dz: CAD/PAD/Aortic Plaque Yes: 1   DM No: 0   Total Score 8     Assessment/Plan   #Paroxysmal atrial fibrillation  #s/p ILR (2023)  AF Dx History: 2022; h/o Cardioversion: Yes; AAD Use: Amiodarone 200mg (current); Anticoagulation use: Apixaban 2.5mg BID (current); h/o Ablation: None; [id number] Score: 8  Patient in the past and had RVR and has been on amiodarone with rhythm control  c/w AC: Apixaban 2.5mg BID (wt and sCr criteria)  Amiodarone 200mg daily - LFTs, TSH WNL in March 2024    #HFrEF s/p ICD ([facility] March 2023)  [employer] dual-chamber ICD implanted due to history of cardiomyopathy EF as low as 20% in the last 2 years.  Patient currently on goal-directed medical therapy to maximally tolerated doses.  [facility] for device check  Move remotes over to [facility].     Return to Clinic: Patient should return to the EP Clinic in 6 months    Keenan Edward MD [facility]  Cardiac Electrophysiology  [email or web address]    **Disclaimer: This note was dictated by speech recognition, and every effort has been made to prevent any error in transcription, however minor errors may be present**

## 2024-05-10 DIAGNOSIS — I42.0 DILATED CARDIOMYOPATHY (MULTI): ICD-10-CM

## 2024-05-10 RX ORDER — SPIRONOLACTONE 25 MG/1
12.5 TABLET ORAL DAILY
Qty: 45 TABLET | Refills: 3 | Status: SHIPPED | OUTPATIENT
Start: 2024-05-10

## 2024-05-13 ENCOUNTER — HOSPITAL ENCOUNTER (OUTPATIENT)
Dept: CARDIOLOGY | Facility: CLINIC | Age: 79
Discharge: HOME | End: 2024-05-13
Payer: MEDICARE

## 2024-05-13 DIAGNOSIS — I47.29 PAROXYSMAL VENTRICULAR TACHYCARDIA (MULTI): ICD-10-CM

## 2024-05-13 DIAGNOSIS — Z95.810 ICD (IMPLANTABLE CARDIOVERTER-DEFIBRILLATOR) IN PLACE: ICD-10-CM

## 2024-05-13 PROCEDURE — 93296 REM INTERROG EVL PM/IDS: CPT

## 2024-05-13 PROCEDURE — 93295 DEV INTERROG REMOTE 1/2/MLT: CPT | Performed by: STUDENT IN AN ORGANIZED HEALTH CARE EDUCATION/TRAINING PROGRAM

## 2024-05-14 ENCOUNTER — TELEPHONE (OUTPATIENT)
Dept: PRIMARY CARE | Facility: CLINIC | Age: 79
End: 2024-05-14

## 2024-05-14 ENCOUNTER — HOSPITAL ENCOUNTER (OUTPATIENT)
Dept: RADIOLOGY | Facility: CLINIC | Age: 79
Discharge: HOME | End: 2024-05-14
Payer: MEDICARE

## 2024-05-14 ENCOUNTER — OFFICE VISIT (OUTPATIENT)
Dept: PRIMARY CARE | Facility: CLINIC | Age: 79
End: 2024-05-14
Payer: MEDICARE

## 2024-05-14 VITALS
TEMPERATURE: 96.4 F | BODY MASS INDEX: 21.09 KG/M2 | SYSTOLIC BLOOD PRESSURE: 94 MMHG | DIASTOLIC BLOOD PRESSURE: 60 MMHG | WEIGHT: 119 LBS | HEART RATE: 70 BPM | OXYGEN SATURATION: 94 % | HEIGHT: 63 IN

## 2024-05-14 DIAGNOSIS — M80.00XA AGE-RELATED OSTEOPOROSIS WITH CURRENT PATHOLOGICAL FRACTURE, INITIAL ENCOUNTER: ICD-10-CM

## 2024-05-14 DIAGNOSIS — M48.54XG NON-TRAUMATIC COMPRESSION FRACTURE OF T8 THORACIC VERTEBRA WITH DELAYED HEALING, SUBSEQUENT ENCOUNTER: Primary | ICD-10-CM

## 2024-05-14 DIAGNOSIS — C34.31 MALIGNANT NEOPLASM OF LOWER LOBE OF RIGHT LUNG (MULTI): ICD-10-CM

## 2024-05-14 DIAGNOSIS — I10 BENIGN ESSENTIAL HYPERTENSION: ICD-10-CM

## 2024-05-14 DIAGNOSIS — C34.2 LUNG CANCER, MIDDLE LOBE (MULTI): ICD-10-CM

## 2024-05-14 DIAGNOSIS — I42.0 DILATED CARDIOMYOPATHY (MULTI): ICD-10-CM

## 2024-05-14 DIAGNOSIS — S22.060D COMPRESSION FRACTURE OF T8 VERTEBRA WITH ROUTINE HEALING, SUBSEQUENT ENCOUNTER: ICD-10-CM

## 2024-05-14 DIAGNOSIS — J43.9 PULMONARY EMPHYSEMA, UNSPECIFIED EMPHYSEMA TYPE (MULTI): ICD-10-CM

## 2024-05-14 DIAGNOSIS — I48.0 PAROXYSMAL A-FIB (MULTI): ICD-10-CM

## 2024-05-14 PROCEDURE — 1123F ACP DISCUSS/DSCN MKR DOCD: CPT | Performed by: INTERNAL MEDICINE

## 2024-05-14 PROCEDURE — 1159F MED LIST DOCD IN RCRD: CPT | Performed by: INTERNAL MEDICINE

## 2024-05-14 PROCEDURE — 1036F TOBACCO NON-USER: CPT | Performed by: INTERNAL MEDICINE

## 2024-05-14 PROCEDURE — 3074F SYST BP LT 130 MM HG: CPT | Performed by: INTERNAL MEDICINE

## 2024-05-14 PROCEDURE — 99214 OFFICE O/P EST MOD 30 MIN: CPT | Performed by: INTERNAL MEDICINE

## 2024-05-14 PROCEDURE — 71250 CT THORAX DX C-: CPT

## 2024-05-14 PROCEDURE — 1157F ADVNC CARE PLAN IN RCRD: CPT | Performed by: INTERNAL MEDICINE

## 2024-05-14 PROCEDURE — 71250 CT THORAX DX C-: CPT | Performed by: RADIOLOGY

## 2024-05-14 PROCEDURE — G2211 COMPLEX E/M VISIT ADD ON: HCPCS | Performed by: INTERNAL MEDICINE

## 2024-05-14 PROCEDURE — 3078F DIAST BP <80 MM HG: CPT | Performed by: INTERNAL MEDICINE

## 2024-05-14 PROCEDURE — 1160F RVW MEDS BY RX/DR IN RCRD: CPT | Performed by: INTERNAL MEDICINE

## 2024-05-14 RX ORDER — ALENDRONATE SODIUM 70 MG/1
70 TABLET ORAL
Qty: 4 TABLET | Refills: 11 | Status: SHIPPED | OUTPATIENT
Start: 2024-05-14 | End: 2025-05-14

## 2024-05-14 RX ORDER — CALCITONIN SALMON 200 [IU]/.09ML
1 SPRAY, METERED NASAL DAILY
Qty: 3.7 ML | Refills: 11 | Status: SHIPPED | OUTPATIENT
Start: 2024-05-14 | End: 2025-05-14

## 2024-05-14 NOTE — TELEPHONE ENCOUNTER
She is getting a ct of the chest that will show what we need to see and she is being added on at 12:45

## 2024-05-14 NOTE — PROGRESS NOTES
Subjective   Patient ID: Shannan Tee is a 79 y.o. female who presents for evaluation for back pain      She is here for upper back pain.  She went to reach for her bathrobe and heard something click in her upper back   Her upper back is very painful. She has a hard time breathing   She takes Tylenol and using Lidocaine to help her pain     She quit smoking 5/12/2024        HEALTH:  PAP no longer needs to repeat   Mammo 1/08, 2016, 5/19, 8/20, 8/21, no longer needs to repeat   BD 1/08 T-0.9, 4/16 T-1.9, 5/19 T-2.1, 8/21 T-2.5, 8/23 T-3.1.   Colon 6/08 adenoma, 5/12 and declines repeat. Declines Cologuard   EKG 11/17, 8/18, 6/19, 9/20, 5/21, 7/21, 5/22, 8/22, 4/23, 3/24 ED   ECHO 9/17, 5/2021, 3/2023   Carotids 9/17 <50%   Urine 2017 , 1/19   Hep C 8/18 -  FLU 1/23  TDAP 2013, 6/2023 with injury   Arexvy recommended and will consider   Prevnar 5/17  Pneumo 1/2011  Shingrix recommend and declines   SARS CVD vaccine declines   Ophth Seen in 2/2024 for corneal abrasion left eye. She had cataract extraction in 2018. She is not wearing glasses except for night driving. No glaucoma or MD.  Copy of her Advanced Directives scanned in her chart 3/2023      Review of Systems  All systems negative except those listed in the HPI       Objective   Visit Vitals  BP 94/60 (BP Location: Left arm, Patient Position: Sitting)   Pulse 70   Temp 35.8 °C (96.4 °F) (Temporal)    Body mass index is 21.08 kg/m².      Physical Exam  Vitals reviewed.   Constitutional:       Appearance: Normal appearance.   HENT:      Head: Normocephalic.      Right Ear: Tympanic membrane, ear canal and external ear normal.      Left Ear: Tympanic membrane, ear canal and external ear normal.      Nose: Nose normal.      Mouth/Throat:      Pharynx: Oropharynx is clear.   Eyes:      Conjunctiva/sclera: Conjunctivae normal.   Cardiovascular:      Rate and Rhythm: Normal rate and regular rhythm.      Pulses: Normal pulses.      Heart sounds: Normal heart sounds.    Pulmonary:      Effort: Pulmonary effort is normal.      Breath sounds: Normal breath sounds.   Abdominal:      General: Bowel sounds are normal.      Palpations: Abdomen is soft.   Musculoskeletal:         General: Normal range of motion.      Cervical back: Normal range of motion and neck supple.      Comments: pain at T5, no tenderness to the ribs   Skin:     General: Skin is warm.   Neurological:      General: No focal deficit present.      Mental Status: She is alert and oriented to person, place, and time.   Psychiatric:         Mood and Affect: Mood normal.         Behavior: Behavior normal.         Thought Content: Thought content normal.         Judgment: Judgment normal.       Assessment/Plan   Problem List Items Addressed This Visit       Osteoporosis    Relevant Medications    alendronate (Fosamax) 70 mg tablet     Other Visit Diagnoses       Non-traumatic compression fracture of T8 thoracic vertebra with delayed healing, subsequent encounter    -  Primary    Relevant Medications    calcitonin, salmon, (Miacalcin) 200 unit/actuation nasal spray    Other Relevant Orders    Referral to Pain Medicine    Compression fracture of T8 vertebra with routine healing, subsequent encounter        Relevant Medications    alendronate (Fosamax) 70 mg tablet            Follow up completed    She is  with 2 sons. Son passed on 6/27/2023 from cirrhosis.      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 Explained diagnosis to patient in detail 5/2024.   She is here for upper back pain.  She went to reach for her bathrobe and heard something click in her upper back   Her upper back is very painful. She has a hard time breathing    She takes Tylenol and using Lidocaine to help her pain    She had CT of her chest today  5/2024- she has a fracture mid thoracic - explained to patient this is not an official read   She can not do an MRI due to having a defibrillator   Recommend she continue using the Lidocaine patches as directed on packaging   She can continue OTC Tylenol as directed on packaging    Continue Fosamax 70 mg weekly   Prescription sent in for calcitonin NS to use daily. Explained the proper way to use calcitonin    I sent Dr Bowman an e- mail to see if he is able to do anything for this patient 5/24.   Dr Bowman has e- mailed me while the patient is still in the exam room. He is willing to see this patient to see if she is a candidate for kyphoplasty   Recommend and orders placed for pain management (Dr Bowman) for further evaluation 5/2024      Seen in the ED on 3/21/2024 for pneumonia and T8 compression fracture :    She has nonischemic EKG therefore there is low suspicion for ACS.   CT angio chest for PE and CT abdomen pelvis was obtained negative for PE or thoracic aortic dissection although aneurysmal dilatation of the ascending thoracic aorta was noted as well as a descending thoracic aortic aneurysm and interstitial edema small bilateral effusions and patchy airspace density right upper lobe as well as mediastinal adenopathy and pulmonary nodule noted   CT abdomen pelvis largely negative for acute findings   She will be treated for pneumonia with Augmentin and doxycycline given first doses here. Was given a copy of her CT results and was advised the importance for follow-up for pulmonary nodule and for aortic dilatation.   She saw Dr Zack Das on 4/30/2024   She saw Dr Edward on 5/17/2024   She has an appt with Dr Pyle 5/15/2024   She has an appt with Dr Gilbert 5/28/2024           She quit smoking 5/12/2024   Her son did laser treatment for smoking.   She can look into laser or hypnosis also a good option   Nicotine dependence I spent more than 3 minutes counseling patient about the need for smoking  cessation and how I can support efforts when patient is ready to quit. Discussed nicotine replacement therapy, Varenicline, Buproprion, hypnosis, support groups and acupuncture as potential options. Patient currently has no signs or symptoms of tobacco related disease.  (Dx code F17.2 or Z87.891)(Billing code 93564 or 53637)        Her hearing is not as good as it used to be   She declines appt with audiology for now 1/2023      Her weight/ BMI is in normal range in office, recommend she maintain  Her weight is 119 and BMI 21.08 IO 5/2024  She is not eating well but had a nice meal last night  She sometimes has emesis postprandial       HTN: BP low normal. She is staying well hydrated. Recommend when she stand she count to 10 prior to taking her first steps. Recommend increased sodium in her diet   Continue atenolol 25 mg daily and hydroxyzine 25 mg TID   ECHO 5/2021 was normal but poor functional capacity   Stress test normal 5/2021  EKG 11/2022 showed a fib with RVR  Recommend she monitor her BP at home and call with elevated readings.    She saw Tracey Schwab in 8/2022 and discontinued lisinopril  She has an appt with Dr Edward on 5/17/2024       Acute respiratory failure and cardiac tamponade.   Admitted on 3/30/2023 and discharged on 4/4/2023  S/p percutaneous pericardiocentesis on 3/30/2023.   Approximately 400 to 500 cc of blood removed from the pericardial sac.  She presented on 3/30/2023 for elective outpatient ICD placement which was complicated by cardiac tamponade and acute hypoxic respiratory failure.   She saw Dr Robles in 4/2023. Recommend seeing Dr Edward, Dr Cohen or Dr Perez    Recommend and orders placed for cardiology 3/2024- recommend Dr Edward    She has an appt with Dr Edward on 5/17/2024      Long standing atrial Fib with prior TIA in 9/2017: On exam: she is not in a- fib 5/24  EP did an implantable loop on 11/18/2022     - S/p DC shock and dual chamber ICD interrogation and reporgramming on  pt refusing lovenox sq 4/20/2023 with Dr Perez   Continue carvedilol 6.25 mg BID and amiodarone 200 mg daily  Continue spironolactone 25 mg daily and atenolol 25 mg daily  Continue Eliquis 2.5 mg BID and hydroxyzine 25 mg BID  EKG 4/2023: Demand pacemaker with underlying A-fib, rate of 96 bpm, QTc 477 ms. No ST elevations or depressions, no acute ischemic injury pattern. T wave inversions in lateral leads are new compared to prior EKGs.  Carotid duplex: <50% stenosis bilateral vertebra arteries patent with antegrade flow  She was on Pradaxa then Eliquis since 9/17 after she was admitted with TIA 9/17   She saw Dr Mcneill in 4/2023. Dr Robles has left   Recommend and orders placed for cardiac electrophysiology for further evaluation 3/2024. Recommend and orders placed for cardiology 3/2024- recommend Dr Edward   She has an appt with Dr Edward on 5/17/2024       Middle cerebral aneurysm right side :  She needs CT angio and has to schedule since 2 mm only      We discussed the patients cardiovascular risk. If needed, lifestyle modifications recommended including: behavioral therapies of nutrition choices, exercise and eliminate habits that are contributing to their cardiac risk. We agreed to a plan to decrease his cardiovascular risks. Discussed ASA. Reviewed Guidelines and approved recommendations made to patient.   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     Young did EBUS on 12/19/2022 showed Interval increase in size of right middle lobe mass when compared with the previous CT from 08/23/2021, concerning for neoplastic etiology. Interval increase in size of multiple mediastinal lymph nodes as detailed above, concerning for metastatic lymphadenopathy. Moderate upper lobe predominant emphysematous changes.   Pathology results 12/2022 showed malignant cells derived from SCC   -treated with SBRT (5 treatments) from 1/30 to 2/3/2022  -5/8/2023 PET showed interval decrease in size of RML mass with mild residual activity  -11/10/2023 chest CT showed interval decrease in size of subcarinal node 1.2 cm, interval decrease in size of right lower paratracheal node 0.7 cm; similar size of consolidative airspace opacity within RML 3.3 x 1.6 cm  with surrounding bandlike opacities with associated traction bronchiectasis and volume loss compatible with postradiation fibrosis  She saw Rad Onc in 8/2023   She saw Heme onc in 11/2023-rad onc has booked her next chest CT for 5/14/2024   She has an appt with Dr Pyle 5/15/2024   She has an appt with Dr Gilbert 5/28/2024     CKD: Cr 1.6 on labs in 3/2024.   Recommend she avoid all NSAIDs   We will continue to monitor       LE edema:  Continue furosemide 40 mg daily   Monitor daily sodium intake  Elevate legs for 45 minutes in the afternoons   Elevate legs as much as possible during the day      Situational depression:  She is still grieving the loss of her son less than a year ago.    Her son passed on 6/27/2023 from cirrhosis.    She is still grieving the passing of her son in 6/2023.   She admits she has depression about her sons passing.    She is happy when she gets to see her grandchildren  She is going to restart Wellbutrin 150 mg daily for smoking cessation and that should help her depression      Insomnia:  She stopped trazodone.  She is sleeping better      Constipation:   Explained systemic medications that are contributing to her  constipation   She is to avoid straining with bowel movement   Continue Mg 200 mg daily.  She can add a stool softener when needed      Neurogenic claudication BLE: she is not walking much due to the pain in her legs   She saw Dr Roth and he did an US of her legs and it was concerning.  Aortogram 7/2021 with lower extremity angiography showed critical proximal right iliac, renal artery stenosis of about 90% or more with heavy calcifications, completely occluded left SFA throughout its length, and a focal mid right SFA stenosis of about 90% had iliac artery stenting via the right femoral approach without complication   MRI of L/S 11/2021 did not show stenosis   EMG studies 11/2021 were normal   She restarted PT but on the 3 rd session she pulled a hamstring    She failed gabapentin   Continue cilostazol 50 mg daily  She saw Dr Mota in 11/2021 and feels she has PAD versus peripheral neuropathy     She saw Dr Winn in 2/2022 and recommend continued medical management   Recommend and orders placed for Dr Zack Das for evaluation 3/2024   She has an appt with Dr Zack Haider on 4/30/2024        Seen in the ED on 12/4/2022 for mechanical fall   She reports she fell off a stool because she missed the last step and went forward over the stool sliding across the floor. She denies hitting her head.   Patient was found to have a clavicle fracture and healing well and no issues   Continue follow up with ortho       Spinal Stenosis: her back pain is chronic   She should wait on any spine surgery until pain is consistent and intolerable.   Xray L/S 8/2020 showed severe multilevel spondylosis worse at L4- 5 and mild anterolisthesis of L3 on L4 and retrolisthesis of L1 on L2   She will continue to follow with Dr Almanzar and Dr Porras   She has done PT and chiropractor in the past and declines to do more   Recommend she look into a girdle to help with posture and back pain     Vitamin D def: levels 15 on  labs in 3/2024  Continue scripted Vitamin D 50K UT daily       She has intermittent cold sores:  She is no longer taking acyclovir      Mammo in 2021 was normal. She declines mammo now due to extensive work up and imaging for SCC lung cancer.   She had PET/ CT in 2023.   Breast exam normal 2023    BD in 2023 was T-3.1. Explained she is osteoporosis stage.   Recommend she begin OTC Ca 600 mg BID, scripted Vitamin D 50 K UT weekly and eat 2 servings of calcium enriched foods daily.   Discussed importance of weight bearing exercises.    Continue Fosamax 70 mg weekly   Prescription sent in for calcitonin NS to use daily. Explained the proper way to use calcitonin      Colonoscopy with Dr Jain in  was normal.  She knows a couple of people who  during colonoscopy and she is afraid to repeat it. She declines Cologuard because her sister had false positive results.  She declines colonoscopy and Cologuard 2021     Ophth: She is going to make an appt with ophthalmology for an eye exam 3/2024  Seen in 2024 for corneal abrasion left eye. She had cataract extraction in 2018. She is not wearing glasses except for night driving. No glaucoma or MD.     She has a LW and MPOA.  Copy of her Advanced Directives scanned in her chart 3/2023     Hep C  -  FLU 2023  TDAP around , 2023 with injury   Arexvy recommended and will consider   Prevnar   Pneumo 2011  Zostavax never and declines   Shingrix recommend and declines   SARS CVD vaccine declines    Disability placard given 2023 for 5 years      Some elements in the chart were copied from Dr. Warren's last office visit with patient.   Notes have been updated where appropriate, and reflect my current medical decision making from today.       RTC in 2024 for follow up or sooner if needed   (MCR due 3/2025)     Scribe Attestation  By signing my name below, IJessica , Scribe   attest that this documentation has been prepared under the  direction and in the presence of Sunshine Warren, MDPatient was identified as a fall risk. Risk prevention instructions provided.

## 2024-05-15 ENCOUNTER — APPOINTMENT (OUTPATIENT)
Dept: RADIATION ONCOLOGY | Facility: CLINIC | Age: 79
End: 2024-05-15
Payer: MEDICARE

## 2024-05-15 ENCOUNTER — APPOINTMENT (OUTPATIENT)
Dept: RADIATION ONCOLOGY | Facility: HOSPITAL | Age: 79
End: 2024-05-15
Payer: MEDICARE

## 2024-05-17 ENCOUNTER — TELEPHONE (OUTPATIENT)
Dept: RADIATION ONCOLOGY | Facility: HOSPITAL | Age: 79
End: 2024-05-17
Payer: MEDICARE

## 2024-05-17 ENCOUNTER — APPOINTMENT (OUTPATIENT)
Dept: CARDIOLOGY | Facility: CLINIC | Age: 79
End: 2024-05-17
Payer: MEDICARE

## 2024-05-17 NOTE — TELEPHONE ENCOUNTER
Called pt to remind of appointment on 5/20/2024 at 11:30 Pt answered and will be present.    Pt is aware that the appointment is a phone/virtual visit, pt. understands that he/she doesn't have to show up in office.

## 2024-05-20 ENCOUNTER — HOSPITAL ENCOUNTER (OUTPATIENT)
Dept: RADIATION ONCOLOGY | Facility: HOSPITAL | Age: 79
Setting detail: RADIATION/ONCOLOGY SERIES
Discharge: HOME | End: 2024-05-20
Payer: MEDICARE

## 2024-05-20 DIAGNOSIS — C34.31 MALIGNANT NEOPLASM OF LOWER LOBE OF RIGHT LUNG (MULTI): Primary | ICD-10-CM

## 2024-05-20 PROCEDURE — 99214 OFFICE O/P EST MOD 30 MIN: CPT | Performed by: NURSE PRACTITIONER

## 2024-05-20 ASSESSMENT — ENCOUNTER SYMPTOMS
HEMATOLOGIC/LYMPHATIC NEGATIVE: 1
FATIGUE: 0
COUGH: 0
WEAKNESS: 1
APPETITE CHANGE: 0
CHOKING: 0
SHORTNESS OF BREATH: 1
APNEA: 0
DIAPHORESIS: 0
UNEXPECTED WEIGHT CHANGE: 0
GASTROINTESTINAL NEGATIVE: 1
ACTIVITY CHANGE: 1
PSYCHIATRIC NEGATIVE: 1
CHEST TIGHTNESS: 0
CHILLS: 0
CARDIOVASCULAR NEGATIVE: 1
FEVER: 0
BACK PAIN: 1

## 2024-05-20 NOTE — PROGRESS NOTES
"  Patient ID: [id number]      3.4 cm SCCA Rlung No clinically neg not interested in surgery T2N0M0 Stage 1B     Technical Summary : Definitive SBRT using motion management  and image guidance     Treatment Area #1  Location : R lung  Dates : 1/30-2/3/22  Number of Treatments : 5  Dose : 50Gy  Modality : 6 FFF photons     Clinical Summary : Tolerated well    History of presenting illness    Telehealth visit with Edda Tee \"Shannan\" today. Patient is a 79 y.o. female who presents today for follow up 2 years and 3 months s/p SBRT to the right lung with Dr. Lundy. Patient is doing ok. She has some fractures in her back that are making it difficult to move around. States the first fracture was after giving her grandson a piggy back ride. The new fracture happened last week when she was turning to get something. She heard it pop. She is scheduled to see pain management tomorrow. Appetite is good. Weight stable. Breathing is baseline. Gets SOB with exertion but feels that is more related to her inactivity. Denies cough, chest pain or fevers. No oxygen or inhaler requirements. No neurological symptoms. Follow up with Dr. Gilbert on 5/28/24.     Review of systems:  Review of Systems   Constitutional:  Positive for activity change. Negative for appetite change, chills, diaphoresis, fatigue, fever and unexpected weight change.   HENT: Negative.     Respiratory:  Positive for shortness of breath (with exertion). Negative for apnea, cough, choking and chest tightness.    Cardiovascular: Negative.    Gastrointestinal: Negative.    Musculoskeletal:  Positive for back pain.   Skin: Negative.    Neurological:  Positive for weakness.   Hematological: Negative.    Psychiatric/Behavioral: Negative.         Past Medical history  She  has a past surgical history that includes Other surgical history (11/22/2022) and CT angio aorta and bilateral iliofemoral runoff w and or wo IV contrast (3/8/2023).    Radiology results:  CT chest wo IV " contrast 05/14/2024    Narrative  Interpreted By:  Kerwin Flores,  STUDY:  CT CHEST WO IV CONTRAST;  5/14/2024 10:21 am    INDICATION:  78 y/o   F with  Signs/Symptoms:followup up 1 year s/p definitive  SBRT RML NSCLC.    LIMITATIONS:  Imaging of the mediastinum and ady is limited without the use of IV  contrast..    ACCESSION NUMBER(S):  [id number]    ORDERING CLINICIAN:  BOBBY SLATER    TECHNIQUE:  Noncontrast Thin-section images were obtained  from the thoracic  inlet down through the diaphragm. Sagittal and coronal reconstruction  images were generated. Mediastinal, lung, bone, and liver windows  were reviewed.    COMPARISON:  Most recent prior is from 03/21/2024.    FINDINGS:  CHEST WALL/BASE OF THE NECK:  Stable left-sided cardiac pacemaker..  No thyromegaly or thyroid mass.    LUNGS/ PLEURA/ AND TRACHEA:  Mild-to-moderate emphysematous changes in the lungs with upper lobe  predominance. Previous bilateral pleural effusions have resolved.  Previous interstitial edema has resolved. Areas of infiltrative  density in the anteromedial mid to lower right upper lobe have  resolved. There is persistent pleural and parenchymal density with  mild bronchiectasis in the anteromedial mid to lower right middle  lobe, not significantly changed. No pleural effusion.  No pneumothorax.  The trachea was grossly intact.    MEDIASTINUM/ADY:  Mild and grossly stable central mediastinal adenopathy with lymph  nodes measuring 10 mm short axis or less. No distinct masslike  fullness in either hilum in this unenhanced exam. No axillary  adenopathy. No cardiomegaly. Moderate coronary artery calcifications  involving LAD and RCA vessels. No pericardial effusion.  Mild-to-moderate mural calcifications in the thoracic aorta. There is  aneurysmal dilatation of the distal descending thoracic aorta at and  within the esophageal hiatus, measuring 46 mm in maximal diameter,  compared to 45 mm on 03/21/2024 and 45 mm on  11/10/2023.    BONES:  No destructive lytic or blastic bone lesion. Prominent compression  fracture at T7, progressed since 03/21/2024. Development of vacuum  disc phenomenon at T6-7 and T7-8. Interval erosive changes involving  the anterior inferior aspect of the T6 vertebral body centered on  sagittal image 60. Mild subchondral sclerosis in the inferior  endplate of T6, not present previously. Grossly stable mild posterior  central disc herniation at T6-7 and T7-8. Mild-to-moderate disc space  narrowing with endplate osteophytosis at T9-10 and T10-11 with  moderate changes at L1-2 and L2-3. Subtle S-shaped dorsal spine  scoliosis. Mild paraspinal soft tissue swelling at T7.    UPPER ABDOMEN:  28 mm diameter exophytic upper pole right renal cyst has decreased in  size compared to 11/10/2023. Stable mid to upper pole parapelvic left  renal cyst measuring 23 mm on the final images. The remainder of  the  imaged upper abdomen was grossly intact.    Impression  Mild-to-moderate emphysema.    Grossly stable localized pleural and parenchymal density with mild  bronchiectasis in the anteromedial mid to lower right middle lobe.  Most likely stable scarring.    Previous right upper lobe infiltrative densities on 03/21/2024 have  resolved. Findings of CHF on  03/21/2024 have resolved.    Mild stable Central mediastinal adenopathy.    Grossly stable aneurysmal dilatation of the distal descending  thoracic aorta. Please see above.    Worsening of now advanced T7 compression fracture since 03/21/2024,  mild paraspinal soft tissue swelling. There is also subtle interval  erosion of the anterior inferior aspect of the T6 vertebral body  which could also be posttraumatic. If there is clinical suspicion for  discitis and osteomyelitis at T6-7, would then recommend contrast  enhanced MRI.    Arthritic changes in the thoracic spine and proximal lumbar spine as  described. Mild stable posterior central disc herniation at T6-7  and  T7-8.    Bilateral renal cysts as described.    MACRO:  None    Signed by: Kerwin Flores 5/15/2024 12:01 PM  Dictation workstation:   [id number]         Plan:  Assessment/Plan     79 year old female 2 years and 3 months s/p SBRT to the right lung. Doing well from a radiation stand point. Breathing stable. CT of the chest done 5/14/24. Scan showed Mild-to-moderate emphysema. Grossly stable localized pleural and parenchymal density with mild bronchiectasis in the anteromedial mid to lower right middle lobe. Most likely stable scarring. Previous right upper lobe infiltrative densities on 03/21/2024 have resolved. Findings of CHF on  03/21/2024 have resolved. Mild stable Central mediastinal adenopathy. Grossly stable aneurysmal dilatation of the distal descending thoracic aorta. Worsening of now advanced T7 compression fracture since 03/21/2024, mild paraspinal soft tissue swelling. There is also subtle interval erosion of the anterior inferior aspect of the T6 vertebral body which could also be posttraumatic. Arthritic changes in the thoracic spine and proximal lumbar spine as described. Mild stable posterior central disc herniation at T6-7 and T7-8.  Bilateral renal cysts as described. Compression fracture being managed by PCP and pain management. Continue follow up as scheduled. Continue follow up with Dr. Gilbert as scheduled. Patient to return to clinic in 6 months with CT of the chest prior unless needs to be seen sooner. Instructed to call with any questions or concerns.     I performed this visit using real- time telehealth tools , including an audio/video or telephone connection between Edda Tee , who is in their home and Aracelis Tony CNP at [facility].

## 2024-05-21 ENCOUNTER — HOSPITAL ENCOUNTER (OUTPATIENT)
Dept: RADIOLOGY | Facility: HOSPITAL | Age: 79
Discharge: HOME | End: 2024-05-21
Payer: MEDICARE

## 2024-05-21 ENCOUNTER — OFFICE VISIT (OUTPATIENT)
Dept: PAIN MEDICINE | Facility: CLINIC | Age: 79
End: 2024-05-21
Payer: MEDICARE

## 2024-05-21 VITALS
DIASTOLIC BLOOD PRESSURE: 90 MMHG | RESPIRATION RATE: 18 BRPM | OXYGEN SATURATION: 94 % | TEMPERATURE: 96.3 F | SYSTOLIC BLOOD PRESSURE: 119 MMHG | HEART RATE: 62 BPM

## 2024-05-21 DIAGNOSIS — S22.060G COMPRESSION FRACTURE OF T7 VERTEBRA WITH DELAYED HEALING, SUBSEQUENT ENCOUNTER: ICD-10-CM

## 2024-05-21 DIAGNOSIS — S22.060G COMPRESSION FRACTURE OF T7 VERTEBRA WITH DELAYED HEALING, SUBSEQUENT ENCOUNTER: Primary | ICD-10-CM

## 2024-05-21 DIAGNOSIS — M48.54XG NON-TRAUMATIC COMPRESSION FRACTURE OF T8 THORACIC VERTEBRA WITH DELAYED HEALING, SUBSEQUENT ENCOUNTER: ICD-10-CM

## 2024-05-21 PROCEDURE — 1125F AMNT PAIN NOTED PAIN PRSNT: CPT | Performed by: PAIN MEDICINE

## 2024-05-21 PROCEDURE — 1157F ADVNC CARE PLAN IN RCRD: CPT | Performed by: PAIN MEDICINE

## 2024-05-21 PROCEDURE — 72100 X-RAY EXAM L-S SPINE 2/3 VWS: CPT | Performed by: RADIOLOGY

## 2024-05-21 PROCEDURE — 72100 X-RAY EXAM L-S SPINE 2/3 VWS: CPT

## 2024-05-21 PROCEDURE — 1123F ACP DISCUSS/DSCN MKR DOCD: CPT | Performed by: PAIN MEDICINE

## 2024-05-21 PROCEDURE — 3080F DIAST BP >= 90 MM HG: CPT | Performed by: PAIN MEDICINE

## 2024-05-21 PROCEDURE — 3074F SYST BP LT 130 MM HG: CPT | Performed by: PAIN MEDICINE

## 2024-05-21 PROCEDURE — 99204 OFFICE O/P NEW MOD 45 MIN: CPT | Performed by: PAIN MEDICINE

## 2024-05-21 PROCEDURE — 1160F RVW MEDS BY RX/DR IN RCRD: CPT | Performed by: PAIN MEDICINE

## 2024-05-21 PROCEDURE — 1159F MED LIST DOCD IN RCRD: CPT | Performed by: PAIN MEDICINE

## 2024-05-21 PROCEDURE — 99214 OFFICE O/P EST MOD 30 MIN: CPT | Performed by: PAIN MEDICINE

## 2024-05-21 ASSESSMENT — ENCOUNTER SYMPTOMS
OCCASIONAL FEELINGS OF UNSTEADINESS: 0
LOSS OF SENSATION IN FEET: 1

## 2024-05-21 ASSESSMENT — PATIENT HEALTH QUESTIONNAIRE - PHQ9
1. LITTLE INTEREST OR PLEASURE IN DOING THINGS: SEVERAL DAYS
SUM OF ALL RESPONSES TO PHQ9 QUESTIONS 1 AND 2: 2
10. IF YOU CHECKED OFF ANY PROBLEMS, HOW DIFFICULT HAVE THESE PROBLEMS MADE IT FOR YOU TO DO YOUR WORK, TAKE CARE OF THINGS AT HOME, OR GET ALONG WITH OTHER PEOPLE: SOMEWHAT DIFFICULT
2. FEELING DOWN, DEPRESSED OR HOPELESS: SEVERAL DAYS

## 2024-05-21 ASSESSMENT — COLUMBIA-SUICIDE SEVERITY RATING SCALE - C-SSRS
6. HAVE YOU EVER DONE ANYTHING, STARTED TO DO ANYTHING, OR PREPARED TO DO ANYTHING TO END YOUR LIFE?: NO
2. HAVE YOU ACTUALLY HAD ANY THOUGHTS OF KILLING YOURSELF?: NO
1. IN THE PAST MONTH, HAVE YOU WISHED YOU WERE DEAD OR WISHED YOU COULD GO TO SLEEP AND NOT WAKE UP?: NO

## 2024-05-21 ASSESSMENT — PAIN DESCRIPTION - DESCRIPTORS: DESCRIPTORS: ACHING

## 2024-05-21 ASSESSMENT — PAIN SCALES - GENERAL: PAINLEVEL_OUTOF10: 6

## 2024-05-21 ASSESSMENT — PAIN - FUNCTIONAL ASSESSMENT: PAIN_FUNCTIONAL_ASSESSMENT: 0-10

## 2024-05-21 NOTE — H&P
History Of Present Illness  Shannan Tee is a 79 y.o. female presenting with thoracic spine pain this has been going on since March the patient had a CAT scan that showed a compression fraction at the level of T7 that progressed from March till May she is describing the pain as aggravated by taking deep breath and having some difficulty laying on her side she was started on gabapentin that make her feel sleepy and currently is also on Tylenol due to the concurrent usage of the blood thinner she was advised not to take any nonsteroidal anti-inflammatory for the risk of bleeding.  Describing the pain mainly as an aching sensation in the mid thoracic spine area radiating towards the front of her rib cage.  Denies any prior intervention into the thoracic spine    Pain Assessment as of today    Pain Assessment   Pain Assessment 0-10   Pain Score 6   Pain Type Chronic pain   Pain Location Back   Pain Orientation Mid   Pain Descriptors Aching   Pain Frequency Constant/continuous   Pain Onset Ongoing   Clinical Progression Gradually worsening   Aggravating Factors Bending, Exercise, Walking, Standing, Stretching   Result of Injury No   Work-Related Injury No   Pain Interventions Medication (See MAR), Cold applied        Past Medical History  Past Medical History:   Diagnosis Date    Cellulitis of right lower limb 06/29/2020    Cellulitis of both feet    ERRONEOUS ENCOUNTER--DISREGARD     Low back pain, unspecified 08/18/2022    Acute bilateral low back pain without sciatica    Nicotine dependence, cigarettes, uncomplicated 07/26/2022    Tobacco dependence due to cigarettes    Other vascular myelopathies (Multi) 12/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    Neurogenic claudication    Pain in unspecified ankle and joints of unspecified foot 08/29/2020    Ankle pain    Personal history of other drug therapy     History of anticoagulant therapy    Personal history of other endocrine, nutritional and metabolic disease 08/18/2022    History of hyperkalemia     Personal history of other infectious and parasitic diseases 01/29/2018    History of herpes labialis    Personal history of other specified conditions 09/04/2020    History of palpitations    Radiculopathy, lumbar region 12/08/2021    Lumbar radiculopathy    Radiculopathy, lumbar region 07/02/2021    Left lumbar radiculopathy    Tobacco abuse counseling 05/06/2022    Tobacco abuse counseling    Transient cerebral ischemic attack, unspecified     TIA on medication    Unspecified mononeuropathy of unspecified lower limb 10/01/2021    Lower extremity neuropathy    Urinary tract infection, site not specified 08/31/2020    Acute UTI       Surgical History  Past Surgical History:   Procedure Laterality Date    CT AORTA AND BILATERAL ILIOFEMORAL RUNOFF ANGIOGRAM W AND/OR WO IV CONTRAST  3/8/2023    CT AORTA AND BILATERAL ILIOFEMORAL RUNOFF ANGIOGRAM W AND/OR WO IV CONTRAST STJ CT    OTHER SURGICAL HISTORY  11/22/2022    Loop recorder insertion        Social History  She reports that she quit smoking about 16 months ago. Her smoking use included cigarettes. She has been exposed to tobacco smoke. She has never used smokeless tobacco. She reports that she does not drink alcohol and does not use drugs.    Family History  Family History   Problem Relation Name Age of Onset    Hypertension Mother      Diabetes Mother      Stroke Father          Allergies  Gabapentin, Sulfa (sulfonamide antibiotics), Sulfanilamide (bulk), and Sulfasalazine    Review of Systems   12 Systems have been reviewed as follows.   Constitutional: Fever, weight gain, weight loss, appetite change, night sweats, fatigue, chills.  Eyes : blurry, double vision, vision, loss, tearing, redness, pain, sensitivity to light, glaucoma.  Ears, nose, mouth, and throat: Hearing loss, ringing in the ears, ear pain, nasal congestion, nasal drainage, nosebleeds, mouth, throat, irritation tooth problem.  Cardiovascular :chest pain, pressure, heart tracing,palpaitations ,  sweating, leg swelling, high or low blood pressure  Pulmonary: Cough, yellow or green sputum, blood and sputum, shortness of breath, wheezing  Gastrointestinal: Nause, vomiting, diarrhea, constipation, pain, blood in stool, or vomitus, heartburn, difficulty swallowing  Genitourinary: incontinence, abnormal bleeding, abnormal discharge, urinary frequency, urinary hesitancy, pain, impotence sexual problem, infection, urinary retention  Musculoskeletal: Pain, stiffness, joint, redness or warmth, arthritis, back pain, weakness, muscle wasting, sprain or fracture  Neuro: Weight weakness, dizziness, change in voice, change in taste change in vision, change in hearing, loss, or change of sensation, trouble walking, balance problems coordination problems, shaking, speech problem  Endocrine , cold or heat intolerance, blood sugar problem, weight gain or loss missed periods hot flashes, sweats, change in body hair, change in libido, increased thirst, increased urination  Heme/lymph: Swelling, bleeding, problem anemia, bruising, enlarged lymph nodes  Allergic/immunologic: H. plus nasal drip, watery itchy eyes, nasal drainage, immunosuppressed  The above, were reviewed and noted negative except as noted.     Physical Exam   Vital signs reviewed, documented in chart     General:  Appears well, does not look in any major distress  Alert    HEENT:  Head atraumatic  Eyes normal inspection  PERRL  Normal ENT inspection  No signs of dehydration    NECK:  Normal inspection  Range of motion within normal     RESPIRATORY:  No respiratory distress    CVS:  Heart rate and rhythm regular    ABDOMEN/GI  Soft  Non-tender  No distention  No organomegaly      BACK:  Normal inspection, flexion and extension within normal limit   tenderness upon the palpation of the facet joint and tenderness around the T7 area described by the patient  Si joints none tender to palpations     EXTREMITIES:  Non-Tender  Full ROM  Normal appearance  No Pedal  edema  Power symmetrical , sensory examination preserved.    NEURO:  Alert and oriented X 3  CNS normal as tested without focal neurological deficit   Sensation normal  Motor normal  reflexes absent     PSYCH:  Mood normal  Affect normal    SKIN:  Color normal  No rash  Warm  Dry  no sign of skin marking supportive of IV drug usage /abuse.     Last Recorded Vitals  Blood pressure 119/90, pulse 62, temperature 35.7 °C (96.3 °F), temperature source Temporal, resp. rate 18, SpO2 94%.    Relevant Results        Narrative & Impression   Interpreted By:  Kerwin Flores,   STUDY:  CT CHEST WO IV CONTRAST;  5/14/2024 10:21 am      INDICATION:  80 y/o   F with  Signs/Symptoms:followup up 1 year s/p definitive  SBRT RML NSCLC.      LIMITATIONS:  Imaging of the mediastinum and ady is limited without the use of IV  contrast..      ACCESSION NUMBER(S):  [id number]      ORDERING CLINICIAN:  BOBBY SLATER      TECHNIQUE:  Noncontrast Thin-section images were obtained  from the thoracic  inlet down through the diaphragm. Sagittal and coronal reconstruction  images were generated. Mediastinal, lung, bone, and liver windows  were reviewed.      COMPARISON:  Most recent prior is from 03/21/2024.      FINDINGS:  CHEST WALL/BASE OF THE NECK:  Stable left-sided cardiac pacemaker..  No thyromegaly or thyroid mass.      LUNGS/ PLEURA/ AND TRACHEA:  Mild-to-moderate emphysematous changes in the lungs with upper lobe  predominance. Previous bilateral pleural effusions have resolved.  Previous interstitial edema has resolved. Areas of infiltrative  density in the anteromedial mid to lower right upper lobe have  resolved. There is persistent pleural and parenchymal density with  mild bronchiectasis in the anteromedial mid to lower right middle  lobe, not significantly changed. No pleural effusion.  No pneumothorax.  The trachea was grossly intact.      MEDIASTINUM/ADY:  Mild and grossly stable central mediastinal adenopathy with  lymph  nodes measuring 10 mm short axis or less. No distinct masslike  fullness in either hilum in this unenhanced exam. No axillary  adenopathy. No cardiomegaly. Moderate coronary artery calcifications  involving LAD and RCA vessels. No pericardial effusion.  Mild-to-moderate mural calcifications in the thoracic aorta. There is  aneurysmal dilatation of the distal descending thoracic aorta at and  within the esophageal hiatus, measuring 46 mm in maximal diameter,  compared to 45 mm on 03/21/2024 and 45 mm on 11/10/2023.      BONES:  No destructive lytic or blastic bone lesion. Prominent compression  fracture at T7, progressed since 03/21/2024. Development of vacuum  disc phenomenon at T6-7 and T7-8. Interval erosive changes involving  the anterior inferior aspect of the T6 vertebral body centered on  sagittal image 60. Mild subchondral sclerosis in the inferior  endplate of T6, not present previously. Grossly stable mild posterior  central disc herniation at T6-7 and T7-8. Mild-to-moderate disc space  narrowing with endplate osteophytosis at T9-10 and T10-11 with  moderate changes at L1-2 and L2-3. Subtle S-shaped dorsal spine  scoliosis. Mild paraspinal soft tissue swelling at T7.      UPPER ABDOMEN:  28 mm diameter exophytic upper pole right renal cyst has decreased in  size compared to 11/10/2023. Stable mid to upper pole parapelvic left  renal cyst measuring 23 mm on the final images. The remainder of  the  imaged upper abdomen was grossly intact.      IMPRESSION:  Mild-to-moderate emphysema.      Grossly stable localized pleural and parenchymal density with mild  bronchiectasis in the anteromedial mid to lower right middle lobe.  Most likely stable scarring.      Previous right upper lobe infiltrative densities on 03/21/2024 have  resolved. Findings of CHF on  03/21/2024 have resolved.      Mild stable Central mediastinal adenopathy.      Grossly stable aneurysmal dilatation of the distal descending  thoracic  aorta. Please see above.      Worsening of now advanced T7 compression fracture since 03/21/2024,  mild paraspinal soft tissue swelling. There is also subtle interval  erosion of the anterior inferior aspect of the T6 vertebral body  which could also be posttraumatic. If there is clinical suspicion for  discitis and osteomyelitis at T6-7, would then recommend contrast  enhanced MRI.      Arthritic changes in the thoracic spine and proximal lumbar spine as  described. Mild stable posterior central disc herniation at T6-7 and  T7-8.      Bilateral renal cysts as described.      MACRO:  None      Signed by: Kerwin Flores 5/15/2024 12:01 PM  Dictation workstation:   [id number]       Narrative & Impression   Interpreted By:  Kerwin Flores,   STUDY:  CT CHEST WO IV CONTRAST;  5/14/2024 10:21 am      INDICATION:  78 y/o   F with  Signs/Symptoms:followup up 1 year s/p definitive  SBRT RML NSCLC.      LIMITATIONS:  Imaging of the mediastinum and darcy is limited without the use of IV  contrast..      ACCESSION NUMBER(S):  [id number]      ORDERING CLINICIAN:  BOBBY SLATER      TECHNIQUE:  Noncontrast Thin-section images were obtained  from the thoracic  inlet down through the diaphragm. Sagittal and coronal reconstruction  images were generated. Mediastinal, lung, bone, and liver windows  were reviewed.      COMPARISON:  Most recent prior is from 03/21/2024.      FINDINGS:  CHEST WALL/BASE OF THE NECK:  Stable left-sided cardiac pacemaker..  No thyromegaly or thyroid mass.      LUNGS/ PLEURA/ AND TRACHEA:  Mild-to-moderate emphysematous changes in the lungs with upper lobe  predominance. Previous bilateral pleural effusions have resolved.  Previous interstitial edema has resolved. Areas of infiltrative  density in the anteromedial mid to lower right upper lobe have  resolved. There is persistent pleural and parenchymal density with  mild bronchiectasis in the anteromedial mid to lower right middle  lobe, not significantly  changed. No pleural effusion.  No pneumothorax.  The trachea was grossly intact.      MEDIASTINUM/ADY:  Mild and grossly stable central mediastinal adenopathy with lymph  nodes measuring 10 mm short axis or less. No distinct masslike  fullness in either hilum in this unenhanced exam. No axillary  adenopathy. No cardiomegaly. Moderate coronary artery calcifications  involving LAD and RCA vessels. No pericardial effusion.  Mild-to-moderate mural calcifications in the thoracic aorta. There is  aneurysmal dilatation of the distal descending thoracic aorta at and  within the esophageal hiatus, measuring 46 mm in maximal diameter,  compared to 45 mm on 03/21/2024 and 45 mm on 11/10/2023.      BONES:  No destructive lytic or blastic bone lesion. Prominent compression  fracture at T7, progressed since 03/21/2024. Development of vacuum  disc phenomenon at T6-7 and T7-8. Interval erosive changes involving  the anterior inferior aspect of the T6 vertebral body centered on  sagittal image 60. Mild subchondral sclerosis in the inferior  endplate of T6, not present previously. Grossly stable mild posterior  central disc herniation at T6-7 and T7-8. Mild-to-moderate disc space  narrowing with endplate osteophytosis at T9-10 and T10-11 with  moderate changes at L1-2 and L2-3. Subtle S-shaped dorsal spine  scoliosis. Mild paraspinal soft tissue swelling at T7.      UPPER ABDOMEN:  28 mm diameter exophytic upper pole right renal cyst has decreased in  size compared to 11/10/2023. Stable mid to upper pole parapelvic left  renal cyst measuring 23 mm on the final images. The remainder of  the  imaged upper abdomen was grossly intact.      IMPRESSION:  Mild-to-moderate emphysema.      Grossly stable localized pleural and parenchymal density with mild  bronchiectasis in the anteromedial mid to lower right middle lobe.  Most likely stable scarring.      Previous right upper lobe infiltrative densities on 03/21/2024 have  resolved.  Findings of CHF on  03/21/2024 have resolved.      Mild stable Central mediastinal adenopathy.      Grossly stable aneurysmal dilatation of the distal descending  thoracic aorta. Please see above.      Worsening of now advanced T7 compression fracture since 03/21/2024,  mild paraspinal soft tissue swelling. There is also subtle interval  erosion of the anterior inferior aspect of the T6 vertebral body  which could also be posttraumatic. If there is clinical suspicion for  discitis and osteomyelitis at T6-7, would then recommend contrast  enhanced MRI.      Arthritic changes in the thoracic spine and proximal lumbar spine as  described. Mild stable posterior central disc herniation at T6-7 and  T7-8.      Bilateral renal cysts as described.      MACRO:  None      Signed by: Kerwin Flores 5/15/2024 12:01 PM  Dictation workstation:   [id number]        Assessment/Plan     79 years old with history and physical examination supportive of compression fracture at the level of the T7 with multiple level degenerative disc disease and disc displacement in the thoracic spine area and multiple level of facet arthropathy in the lower lumbar spine area    Plan  Discussed with the patient the different modalities available for the treatment of her condition in the presence of her sister I explained for her that she have a acute compression fracture at the level of T7 that progressed from March till May we discussed conservative management by allowing the fracture to heal by itself versus kyphoplasty the benefits and the risk of the kyphoplasty including bleeding and spillage of cement material into the long area or into the canal requiring intervention and surgery along with the other possible complications were discussed with the patient today she also understand that being on the blood thinner would be required to be off for few days to prevent the risk of an epidural or spinal hematoma compressing on her spinal cord leading to  nerve damage or paralysis.  In terms of her lower back I advised the patient that I would recommend obtaining a new x-ray of the lumbar spine area and I will be referring her for physical therapy to be targeting the lower lumbar spine area and she will be considered if she fails the conservative management with the physical therapy for epidural steroid injection and medial nerve branch blocks on as-needed basis to be performed under fluoroscopic guidance  Patient understand the benefits and risk and she would like to proceed with a kyphoplasty at T7 she will be confirming with the prescriber of the Eliquis the safety of holding it for few days prior to the intervention  I will reevaluate her after the performance of the kyphoplasty for further recommendation as her case progresses      The above clinical summary has been dictated with voice recognition software. It has not been proofread for grammatical errors, typographical mistakes, or other semantic inconsistencies.    Thank you for visiting our office today. It was our pleasure to take part in your healthcare.     Please do not hesitate to contact the pain clinic after your visit with any questions or concerns at  M-F 8-4 pm       Matthew Bowman M.D.  Medical Director , Division of Pain Medicine [facility]   of Anesthesiology and Pain Medicine  [facility] [employer]     [place] [address] [address] [place] [address]     Office: [phone]  Fax: [phone]            Matthew Bowman MD

## 2024-05-24 ENCOUNTER — LAB (OUTPATIENT)
Dept: LAB | Facility: CLINIC | Age: 79
End: 2024-05-24
Payer: MEDICARE

## 2024-05-24 DIAGNOSIS — C34.2 MALIGNANT NEOPLASM OF MIDDLE LOBE OF RIGHT LUNG (MULTI): ICD-10-CM

## 2024-05-24 LAB
ALBUMIN SERPL BCP-MCNC: 4.1 G/DL (ref 3.4–5)
ALP SERPL-CCNC: 143 U/L (ref 33–136)
ALT SERPL W P-5'-P-CCNC: 17 U/L (ref 7–45)
ANION GAP SERPL CALC-SCNC: 14 MMOL/L (ref 10–20)
AST SERPL W P-5'-P-CCNC: 19 U/L (ref 9–39)
BASOPHILS # BLD AUTO: 0.04 X10*3/UL (ref 0–0.1)
BASOPHILS NFR BLD AUTO: 0.5 %
BILIRUB SERPL-MCNC: 0.5 MG/DL (ref 0–1.2)
BUN SERPL-MCNC: 39 MG/DL (ref 6–23)
CALCIUM SERPL-MCNC: 9.7 MG/DL (ref 8.6–10.3)
CEA SERPL-MCNC: 5.8 UG/L
CHLORIDE SERPL-SCNC: 106 MMOL/L (ref 98–107)
CO2 SERPL-SCNC: 24 MMOL/L (ref 21–32)
CREAT SERPL-MCNC: 1.79 MG/DL (ref 0.5–1.05)
EGFRCR SERPLBLD CKD-EPI 2021: 29 ML/MIN/1.73M*2
EOSINOPHIL # BLD AUTO: 0.26 X10*3/UL (ref 0–0.4)
EOSINOPHIL NFR BLD AUTO: 3.3 %
ERYTHROCYTE [DISTWIDTH] IN BLOOD BY AUTOMATED COUNT: 14.4 % (ref 11.5–14.5)
GLUCOSE SERPL-MCNC: 81 MG/DL (ref 74–99)
HCT VFR BLD AUTO: 42.7 % (ref 36–46)
HGB BLD-MCNC: 13.8 G/DL (ref 12–16)
IMM GRANULOCYTES # BLD AUTO: 0.01 X10*3/UL (ref 0–0.5)
IMM GRANULOCYTES NFR BLD AUTO: 0.1 % (ref 0–0.9)
LYMPHOCYTES # BLD AUTO: 1.73 X10*3/UL (ref 0.8–3)
LYMPHOCYTES NFR BLD AUTO: 21.8 %
MCH RBC QN AUTO: 31.2 PG (ref 26–34)
MCHC RBC AUTO-ENTMCNC: 32.3 G/DL (ref 32–36)
MCV RBC AUTO: 97 FL (ref 80–100)
MONOCYTES # BLD AUTO: 0.96 X10*3/UL (ref 0.05–0.8)
MONOCYTES NFR BLD AUTO: 12.1 %
NEUTROPHILS # BLD AUTO: 4.94 X10*3/UL (ref 1.6–5.5)
NEUTROPHILS NFR BLD AUTO: 62.2 %
PLATELET # BLD AUTO: 164 X10*3/UL (ref 150–450)
POTASSIUM SERPL-SCNC: 4.7 MMOL/L (ref 3.5–5.3)
PROT SERPL-MCNC: 7.7 G/DL (ref 6.4–8.2)
RBC # BLD AUTO: 4.42 X10*6/UL (ref 4–5.2)
SODIUM SERPL-SCNC: 139 MMOL/L (ref 136–145)
WBC # BLD AUTO: 7.9 X10*3/UL (ref 4.4–11.3)

## 2024-05-24 PROCEDURE — 82378 CARCINOEMBRYONIC ANTIGEN: CPT

## 2024-05-24 PROCEDURE — 85025 COMPLETE CBC W/AUTO DIFF WBC: CPT

## 2024-05-24 PROCEDURE — 84075 ASSAY ALKALINE PHOSPHATASE: CPT

## 2024-05-24 PROCEDURE — 36415 COLL VENOUS BLD VENIPUNCTURE: CPT

## 2024-05-28 ENCOUNTER — OFFICE VISIT (OUTPATIENT)
Dept: HEMATOLOGY/ONCOLOGY | Facility: CLINIC | Age: 79
End: 2024-05-28
Payer: MEDICARE

## 2024-05-28 VITALS
DIASTOLIC BLOOD PRESSURE: 80 MMHG | WEIGHT: 106.92 LBS | RESPIRATION RATE: 16 BRPM | BODY MASS INDEX: 18.94 KG/M2 | TEMPERATURE: 97.2 F | HEART RATE: 68 BPM | SYSTOLIC BLOOD PRESSURE: 128 MMHG

## 2024-05-28 DIAGNOSIS — J43.9 PULMONARY EMPHYSEMA, UNSPECIFIED EMPHYSEMA TYPE (MULTI): ICD-10-CM

## 2024-05-28 DIAGNOSIS — I48.0 PAROXYSMAL A-FIB (MULTI): ICD-10-CM

## 2024-05-28 DIAGNOSIS — C34.2 MALIGNANT NEOPLASM OF MIDDLE LOBE OF RIGHT LUNG (MULTI): ICD-10-CM

## 2024-05-28 DIAGNOSIS — I10 BENIGN ESSENTIAL HYPERTENSION: Primary | ICD-10-CM

## 2024-05-28 PROCEDURE — 99214 OFFICE O/P EST MOD 30 MIN: CPT | Performed by: INTERNAL MEDICINE

## 2024-05-28 PROCEDURE — 1123F ACP DISCUSS/DSCN MKR DOCD: CPT | Performed by: INTERNAL MEDICINE

## 2024-05-28 PROCEDURE — 3074F SYST BP LT 130 MM HG: CPT | Performed by: INTERNAL MEDICINE

## 2024-05-28 PROCEDURE — 1159F MED LIST DOCD IN RCRD: CPT | Performed by: INTERNAL MEDICINE

## 2024-05-28 PROCEDURE — 1157F ADVNC CARE PLAN IN RCRD: CPT | Performed by: INTERNAL MEDICINE

## 2024-05-28 PROCEDURE — 1126F AMNT PAIN NOTED NONE PRSNT: CPT | Performed by: INTERNAL MEDICINE

## 2024-05-28 PROCEDURE — 3079F DIAST BP 80-89 MM HG: CPT | Performed by: INTERNAL MEDICINE

## 2024-05-28 PROCEDURE — 1160F RVW MEDS BY RX/DR IN RCRD: CPT | Performed by: INTERNAL MEDICINE

## 2024-05-28 ASSESSMENT — ENCOUNTER SYMPTOMS
CARDIOVASCULAR NEGATIVE: 1
NEUROLOGICAL NEGATIVE: 1
RESPIRATORY NEGATIVE: 1
GASTROINTESTINAL NEGATIVE: 1
HEMATOLOGIC/LYMPHATIC NEGATIVE: 1
EYES NEGATIVE: 1
CONSTITUTIONAL NEGATIVE: 1
ENDOCRINE NEGATIVE: 1
BACK PAIN: 1
PSYCHIATRIC NEGATIVE: 1

## 2024-05-28 ASSESSMENT — PAIN SCALES - GENERAL: PAINLEVEL: 0-NO PAIN

## 2024-05-28 NOTE — PROGRESS NOTES
Patient ID: Shannan Tee is a 79 y.o. female.  Referring Physician: Mango Gilbert MD  [address]  [place] [address]  Primary Care Provider: Sunshine Warren MD  Visit Type: Follow Up      Subjective    HPI  I am doing okay, how was my labwork?    Review of Systems   Constitutional: Negative.    HENT:  Negative.     Eyes: Negative.    Respiratory: Negative.     Cardiovascular: Negative.    Gastrointestinal: Negative.    Endocrine: Negative.    Genitourinary: Negative.     Musculoskeletal:  Positive for back pain.   Skin: Negative.    Neurological: Negative.    Hematological: Negative.    Psychiatric/Behavioral: Negative.          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), Pain in unspecified ankle and joints of unspecified foot (08/29/2020), Personal history of other drug therapy, Personal history of other endocrine, nutritional and metabolic disease (08/18/2022), Personal history of other infectious and parasitic diseases (01/29/2018), Personal history of other specified conditions (09/04/2020), Radiculopathy, lumbar region (12/08/2021), Radiculopathy, lumbar region (07/02/2021), Tobacco abuse counseling (05/06/2022), Transient cerebral ischemic attack, unspecified, Unspecified mononeuropathy of unspecified lower limb (10/01/2021), and Urinary tract infection, site not specified (08/31/2020).   has a past surgical history that includes Other surgical history (11/22/2022) and CT angio aorta and bilateral iliofemoral runoff w and or wo IV contrast  "(3/8/2023).  Family History   Problem Relation Name Age of Onset    Hypertension Mother      Diabetes Mother      Stroke Father       Oncology History    No history exists.       Edda Tee \"Shannan\"  reports that she quit smoking about 16 months ago. Her smoking use included cigarettes. She has been exposed to tobacco smoke. She has never used smokeless tobacco.  She  reports no history of alcohol use.  She  reports no history of drug use.    Physical Exam  Vitals reviewed.   Constitutional:       Appearance: Normal appearance.   HENT:      Head: Normocephalic.      Mouth/Throat:      Mouth: Mucous membranes are moist.   Eyes:      Extraocular Movements: Extraocular movements intact.      Pupils: Pupils are equal, round, and reactive to light.   Cardiovascular:      Rate and Rhythm: Normal rate and regular rhythm.      Pulses: Normal pulses.      Heart sounds: Normal heart sounds.   Pulmonary:      Breath sounds: Normal breath sounds.   Abdominal:      General: Bowel sounds are normal.      Palpations: Abdomen is soft.   Musculoskeletal:         General: Normal range of motion.      Cervical back: Normal range of motion and neck supple.   Skin:     General: Skin is warm.   Neurological:      General: No focal deficit present.      Mental Status: She is alert and oriented to person, place, and time.   Psychiatric:         Mood and Affect: Mood normal.         Behavior: Behavior normal.         WBC   Date/Time Value Ref Range Status   05/24/2024 09:17 AM 7.9 4.4 - 11.3 x10*3/uL Final   03/26/2024 03:02 PM 10.4 4.4 - 11.3 x10*3/uL Final   03/21/2024 11:32 AM 14.3 (H) 4.4 - 11.3 x10*3/uL Final     nRBC   Date Value Ref Range Status   03/26/2024 0.0 0.0 - 0.0 /100 WBCs Final   03/21/2024 0.0 0.0 - 0.0 /100 WBCs Final   04/20/2023 0.0 0.0 - 0.0 /100 WBC Final   04/19/2023 0.0 0.0 - 0.0 /100 WBC Final   04/18/2023 0.0 0.0 - 0.0 /100 WBC Final     RBC   Date Value Ref Range Status   05/24/2024 4.42 4.00 - 5.20 x10*6/uL " "Final   03/26/2024 4.53 4.00 - 5.20 x10*6/uL Final   03/21/2024 3.88 (L) 4.00 - 5.20 x10*6/uL Final     Hemoglobin   Date Value Ref Range Status   05/24/2024 13.8 12.0 - 16.0 g/dL Final   03/26/2024 14.3 12.0 - 16.0 g/dL Final   03/21/2024 12.1 12.0 - 16.0 g/dL Final     Hematocrit   Date Value Ref Range Status   05/24/2024 42.7 36.0 - 46.0 % Final   03/26/2024 44.9 36.0 - 46.0 % Final   03/21/2024 38.5 36.0 - 46.0 % Final     MCV   Date/Time Value Ref Range Status   05/24/2024 09:17 AM 97 80 - 100 fL Final   03/26/2024 03:02 PM 99 80 - 100 fL Final   03/21/2024 11:32 AM 99 80 - 100 fL Final     MCH   Date/Time Value Ref Range Status   05/24/2024 09:17 AM 31.2 26.0 - 34.0 pg Final   03/26/2024 03:02 PM 31.6 26.0 - 34.0 pg Final   03/21/2024 11:32 AM 31.2 26.0 - 34.0 pg Final     MCHC   Date/Time Value Ref Range Status   05/24/2024 09:17 AM 32.3 32.0 - 36.0 g/dL Final   03/26/2024 03:02 PM 31.8 (L) 32.0 - 36.0 g/dL Final   03/21/2024 11:32 AM 31.4 (L) 32.0 - 36.0 g/dL Final     RDW   Date/Time Value Ref Range Status   05/24/2024 09:17 AM 14.4 11.5 - 14.5 % Final   03/26/2024 03:02 PM 15.5 (H) 11.5 - 14.5 % Final   03/21/2024 11:32 AM 14.8 (H) 11.5 - 14.5 % Final     Platelets   Date/Time Value Ref Range Status   05/24/2024 09:17  150 - 450 x10*3/uL Final   03/26/2024 03:02  150 - 450 x10*3/uL Final   03/21/2024 11:32  150 - 450 x10*3/uL Final     No results found for: \"MPV\"  Neutrophils %   Date/Time Value Ref Range Status   05/24/2024 09:17 AM 62.2 40.0 - 80.0 % Final   03/26/2024 03:02 PM 66.4 40.0 - 80.0 % Final   03/21/2024 11:32 AM 77.2 40.0 - 80.0 % Final     Immature Granulocytes %, Automated   Date/Time Value Ref Range Status   05/24/2024 09:17 AM 0.1 0.0 - 0.9 % Final     Comment:     Immature Granulocyte Count (IG) includes promyelocytes, myelocytes and metamyelocytes but does not include bands. Percent differential counts (%) should be interpreted in the context of the absolute cell " counts (cells/UL).   03/26/2024 03:02 PM 1.4 (H) 0.0 - 0.9 % Final     Comment:     Immature Granulocyte Count (IG) includes promyelocytes, myelocytes and metamyelocytes but does not include bands. Percent differential counts (%) should be interpreted in the context of the absolute cell counts (cells/UL).   03/21/2024 11:32 AM 0.7 0.0 - 0.9 % Final     Comment:     Immature Granulocyte Count (IG) includes promyelocytes, myelocytes and metamyelocytes but does not include bands. Percent differential counts (%) should be interpreted in the context of the absolute cell counts (cells/UL).     Lymphocytes %   Date/Time Value Ref Range Status   05/24/2024 09:17 AM 21.8 13.0 - 44.0 % Final   03/26/2024 03:02 PM 19.9 13.0 - 44.0 % Final   03/21/2024 11:32 AM 12.9 13.0 - 44.0 % Final     Monocytes %   Date/Time Value Ref Range Status   05/24/2024 09:17 AM 12.1 2.0 - 10.0 % Final   03/26/2024 03:02 PM 10.5 2.0 - 10.0 % Final   03/21/2024 11:32 AM 9.1 2.0 - 10.0 % Final     Eosinophils %   Date/Time Value Ref Range Status   05/24/2024 09:17 AM 3.3 0.0 - 6.0 % Final   03/26/2024 03:02 PM 1.4 0.0 - 6.0 % Final   03/21/2024 11:32 AM 0.0 0.0 - 6.0 % Final     Basophils %   Date/Time Value Ref Range Status   05/24/2024 09:17 AM 0.5 0.0 - 2.0 % Final   03/26/2024 03:02 PM 0.4 0.0 - 2.0 % Final   03/21/2024 11:32 AM 0.1 0.0 - 2.0 % Final     Neutrophils Absolute   Date/Time Value Ref Range Status   05/24/2024 09:17 AM 4.94 1.60 - 5.50 x10*3/uL Final     Comment:     Percent differential counts (%) should be interpreted in the context of the absolute cell counts (cells/uL).   03/26/2024 03:02 PM 6.86 (H) 1.60 - 5.50 x10*3/uL Final     Comment:     Percent differential counts (%) should be interpreted in the context of the absolute cell counts (cells/uL).   03/21/2024 11:32 AM 11.02 (H) 1.60 - 5.50 x10*3/uL Final     Comment:     Percent differential counts (%) should be interpreted in the context of the absolute cell counts (cells/uL).  "    Immature Granulocytes Absolute, Automated   Date/Time Value Ref Range Status   05/24/2024 09:17 AM 0.01 0.00 - 0.50 x10*3/uL Final   03/26/2024 03:02 PM 0.15 0.00 - 0.50 x10*3/uL Final   03/21/2024 11:32 AM 0.10 0.00 - 0.50 x10*3/uL Final     Lymphocytes Absolute   Date/Time Value Ref Range Status   05/24/2024 09:17 AM 1.73 0.80 - 3.00 x10*3/uL Final   03/26/2024 03:02 PM 2.06 0.80 - 3.00 x10*3/uL Final   03/21/2024 11:32 AM 1.84 0.80 - 3.00 x10*3/uL Final     Monocytes Absolute   Date/Time Value Ref Range Status   05/24/2024 09:17 AM 0.96 (H) 0.05 - 0.80 x10*3/uL Final   03/26/2024 03:02 PM 1.09 (H) 0.05 - 0.80 x10*3/uL Final   03/21/2024 11:32 AM 1.30 (H) 0.05 - 0.80 x10*3/uL Final     Eosinophils Absolute   Date/Time Value Ref Range Status   05/24/2024 09:17 AM 0.26 0.00 - 0.40 x10*3/uL Final   03/26/2024 03:02 PM 0.15 0.00 - 0.40 x10*3/uL Final   03/21/2024 11:32 AM 0.00 0.00 - 0.40 x10*3/uL Final     Basophils Absolute   Date/Time Value Ref Range Status   05/24/2024 09:17 AM 0.04 0.00 - 0.10 x10*3/uL Final   03/26/2024 03:02 PM 0.04 0.00 - 0.10 x10*3/uL Final   03/21/2024 11:32 AM 0.02 0.00 - 0.10 x10*3/uL Final       No components found for: \"PT\"  aPTT   Date/Time Value Ref Range Status   03/30/2023 09:19 PM 28 26 - 39 sec Final     Comment:       THE APTT IS NO LONGER USED FOR MONITORING     UNFRACTIONATED HEPARIN THERAPY.    FOR MONITORING HEPARIN THERAPY,     USE THE HEPARIN ASSAY.     11/13/2022 02:30 PM 32 26 - 39 sec Final     Comment:       THE APTT IS NO LONGER USED FOR MONITORING     UNFRACTIONATED HEPARIN THERAPY.    FOR MONITORING HEPARIN THERAPY,     USE THE HEPARIN ASSAY.       Medication Documentation Review Audit       Reviewed by Na Bustillo MA (Medical Assistant) on 05/28/24 at 1408      Medication Order Taking? Sig Documenting Provider Last Dose Status   alendronate (Fosamax) 70 mg tablet [id number] Yes Take 1 tablet (70 mg) by mouth every 7 days. Take in the morning with a full glass of " water, on an empty stomach, and do not take anything else by mouth or lie down for the next 30 min. Sunshine Warren MD Taking Active   amiodarone (Pacerone) 200 mg tablet [id number] Yes TAKE ONE TABLET BY MOUTH EVERY DAY Sunshine Warren MD Taking Active   apixaban (Eliquis) 2.5 mg tablet [id number] Yes Take 1 tablet (2.5 mg) by mouth 2 times a day. Rafaela Das MD Taking Active   calcitonin, salmon, (Miacalcin) 200 unit/actuation nasal spray [id number] Yes Administer 1 spray into one nostril once daily. Sunshine Warren MD Taking Active   carvedilol (Coreg) 6.25 mg tablet [id number] Yes Take 0.5 tablets (3.125 mg) by mouth 2 times a day with meals. Sunshine Warren MD Taking Active   Eliquis 5 mg tablet [id number] Yes TAKE ONE-HALF TABLET BY MOUTH TWICE DAILY Sunshine Warren MD Taking Active   ergocalciferol (Vitamin D-2) 1.25 MG (06676 UT) capsule [id number] Yes Take 1 capsule (50,000 Units) by mouth 1 (one) time per week. Sunshine Warren MD Taking Active   hydrOXYzine HCL (Atarax) 25 mg tablet [id number] Yes TAKE ONE TABLET BY MOUTH TWO TIMES A DAY Sunshine Warren MD Taking Active   lidocaine (Lidoderm) 5 % patch [id number] Yes Place 1 patch over 12 hours on the skin once daily. Apply to painful area 12 hours per day, remove for 12 hours. Sunshine Warren MD Taking Active   MAGNESIUM GLUCONATE ORAL [id number] Yes Take 1 tablet by mouth once daily. 500 MG Oral Tablet Historical Provider, MD Taking Active   prednisoLONE acetate (Pred-Forte) 1 % ophthalmic suspension [id number] Yes Administer 1 drop into the left eye 4 times a day. Historical Provider, MD Taking Active   spironolactone (Aldactone) 25 mg tablet [id number] Yes TAKE ONE-HALF TABLET BY MOUTH DAILY Benedicto Perez MD Taking Active   vit C/E/Zn/coppr/lutein/zeaxan (PRESERVISION AREDS-2 ORAL) [id number] Yes Take 1 capsule by mouth once daily. Historical Provider, MD Taking Active   vitamin E 180 mg (400 unit) capsule [id number] No Take 1 capsule  (400 Units) by mouth once daily. Historical Provider, MD Not Taking Active                   Assessment/Plan    1) NSCLC  -here for interval followup  -brain MRI was negative  -PET scan showed no extrathoracic disease  -reviewed in thoracic tumor bd --pt was recommended surgery vs SBRT vs  [place] [id number] study  -pt declined surgery  -[facility] - study entails SBRT followed by 1 year of immunotherapy, but study is conducted at [facility]--pt declined going to [facility]  -treated with SBRT (5 treatments) from 1/30 to 2/3/2022  -5/8/2023 PET showed interval decrease in size of RML mass with mild residual activity  -11/10/2023 chest CT showed interval decrease in size of subcarinal node 1.2 cm, interval decrease in size of right lower paratracheal node 0.7 cm; similar size of consolidative airspace opacity within RML 3.3 x 1.6 cm  with surrounding bandlike opacities with associated traction bronchiectasis and volume loss compatible with postradiation fibrosis  -managed to quit smoking for 8 months, but after her son passed away, she started smoking again  -had one of her grandkids on her back playing "[employer]--didn't realize she sustained a compression fracture of her spine--waiting to hear from Dr Bowman's office re: kyphoplasty appointment  -last chest CT done on 5/14/2024 reviewed-mild to moderate emphysematous changes in the lungs with upper lobe predominance; previous bilateral pleural effusions have resolved; previous interstitial edema has resolved; areas of infiltrative density in anteromedial mid to lower right upper lobe have resolved; there is persistent pleural and parenchymal density with mild bronchiectasis in anteromedial mid to lower right middle lobe unchanged; mild and grossly stable central mediastinal adenopathy with lymph nodes measuring 10 mm or less; prominent compression fracture at T7 progressed since 3/21/2024 with mild paraspinal soft tissue swelling; there is also subtle interval erosion of anterior inferior  aspect of the T6 vertebral body  -labs done on 5/24/2024  includedCBC, COMP and CEA  -results reviewed; wbc 7.9, hgb 13.8, plt 164,000, creatinine 1.79,  AST 19, ALT 17, CEA 5.8 (pt is still smoking)  -will see her again in 6 months, after next CT is done (11/14/2024)        2) hypertension  -on lasix  -on coreg  -on lisinopril     3) COPD  -on spiriva  -quit smoking in 11/2022     4) atrial fibrillation  -on eliquis    -on amiodarone           Problem List Items Addressed This Visit             ICD-10-CM    Lung cancer (Multi) C34.90            Mango Gilbert MD

## 2024-05-29 ENCOUNTER — TELEPHONE (OUTPATIENT)
Dept: PAIN MEDICINE | Facility: CLINIC | Age: 79
End: 2024-05-29
Payer: MEDICARE

## 2024-05-29 NOTE — TELEPHONE ENCOUNTER
SHE IS CALLING IN TO STATE THAT SHE MISSED THE CALL IN REGARDS TO SCHEDULING THE KYPHOPLASTY  REQUESTING A CALLBACK TO SCHEDULE

## 2024-05-30 ENCOUNTER — EVALUATION (OUTPATIENT)
Dept: PHYSICAL THERAPY | Facility: CLINIC | Age: 79
End: 2024-05-30
Payer: MEDICARE

## 2024-05-30 DIAGNOSIS — S22.060G COMPRESSION FRACTURE OF T7 VERTEBRA WITH DELAYED HEALING, SUBSEQUENT ENCOUNTER: ICD-10-CM

## 2024-05-30 PROCEDURE — 97161 PT EVAL LOW COMPLEX 20 MIN: CPT | Mod: GP

## 2024-05-30 PROCEDURE — 97110 THERAPEUTIC EXERCISES: CPT | Mod: GP

## 2024-05-30 NOTE — PROGRESS NOTES
"Physical Therapy    Physical Therapy Evaluation and Treatment      Patient Name: Edda Tee \"Shannan\"  MRN: [id number]  Today's Date: 2024  Time Calculation  Start Time: 1440  Stop Time: 1510  Time Calculation (min): 30 min    Visit #1  0% coins, no ded, $3000 oop ($480.38 met), $30 copay, bmn audrey yr, no PA       Assessment:    Pt presents with symptoms of low back and thoracic spine pain consistent with medical diagnosis of T7 compression fracture. Patient demonstrates impairments of bilateral hip and shoulder weakness along with limited spinal mobility. Patient would benefit from PT services to address current impairments and facilitate improvement in current activity limits. Educated patient on current POC, initial HEP and current examination findings. Patient verbalized understanding of all education and instruction provided today.     Plan:   1x per week 20 v    Current Problem:   1. Compression fracture of T7 vertebra with delayed healing, subsequent encounter  Referral to Physical Therapy          Subjective    General:  Pt presents with c/o thoracic spine pain. Patient denies any recent falls and no current numbness/tingling. Pt reports symptom onset after reaching for something in her coat closet and reporting a \"popping sensation\". Pt is scheduled to follow-up with spine surgeon regarding candidacy for surgical kyphoplasty and has been referred to PT services for conservative management of her pain symptoms.     Precautions:   T7 vertebral compression fracture        Pain:   5/10 thoracic/lumbar spine           Objective   Spine Musculoskeletal Exam    Palpation    Thoracolumbar    Tenderness: present      Spinous process: upper lumbar    Right      Muscle tone: increased    Left      Muscle tone: increased    Range of Motion    Thoracolumbar       Flexion: 25%. Flexion detail: pain and guarding.     Extension: 50%. Extension detail: pain and guarding.       Right      Lateral bendin%. Lateral " bending detail: pain and guarding.       Lateral rotation: 50%. Lateral rotation detail: pain and guarding.       Left      Lateral bendin%. Lateral bending detail: pain and guarding.       Lateral rotation: 50%. Lateral rotation detail: pain and guarding.      Strength    Cervical Spine      Right      Shoulder external rotation: 3/5.       Left      Shoulder external rotation: 3/5.     Thoracolumbar       Right      Quadriceps: 4-/5.       Hamstrin-/5.       Hip abductors: 4-/5.       Hip flexion: 3/5.       Hip adduction: 4-/5.        Left      Quadriceps: 4-/5.       Hamstrin-/5.       Hip abductors: 4-/5.       Hip flexion: 3/5.       Hip adduction: 4-/5.     Shoulder Musculoskeletal Exam    Strength    Right      External rotation: 3/5.       Internal rotation: 4-/5.       Abduction: 3/5.     Left      External rotation: 3/5.       Internal rotation: 4-/5.       Abduction: 3/5.        Outcome Measures:  Other Measures  Oswestry Disablity Index (PIYUSH): 36%     Treatments:  Rows with YTB with paced breathing 2x10  Shoudler extension with YTB 2x10  Hooklying diaphragmatic breathing 3 min     EDUCATION:   HEP, Precautions, POC    Goals:  Patient will be independent with HEP.    Patient will improve PIYUSH score to     Patient will improve hip extension strength to >/=4+/5 for improved lumbopelvic stability with ambulation and ADL performance.    Patient will improve hip abduction strength to >/4+/5 for improved proximal hip stability and SL stability with ambulation.    Patient will demonstrate trunk AROM that is WNL and painless in all planes.    Patient will report 0/10 back pain with ADL performance.

## 2024-05-31 ENCOUNTER — HOSPITAL ENCOUNTER (OUTPATIENT)
Dept: CARDIOLOGY | Facility: CLINIC | Age: 79
Discharge: HOME | End: 2024-05-31
Payer: MEDICARE

## 2024-05-31 DIAGNOSIS — R06.02 SHORTNESS OF BREATH: ICD-10-CM

## 2024-05-31 DIAGNOSIS — I50.22 CHRONIC SYSTOLIC CONGESTIVE HEART FAILURE (MULTI): ICD-10-CM

## 2024-05-31 LAB
AORTIC VALVE PEAK VELOCITY: 0.94 M/S
AV PEAK GRADIENT: 3.5 MMHG
AVA (PEAK VEL): 2.63 CM2
EJECTION FRACTION APICAL 4 CHAMBER: 64.3
LEFT ATRIUM VOLUME AREA LENGTH INDEX BSA: 27.2 ML/M2
LEFT VENTRICLE INTERNAL DIMENSION DIASTOLE: 4.5 CM (ref 3.5–6)
LEFT VENTRICULAR OUTFLOW TRACT DIAMETER: 1.95 CM
LV EJECTION FRACTION BIPLANE: 60 %
MITRAL VALVE E/A RATIO: 0.92
MITRAL VALVE E/E' RATIO: 8.26
RIGHT VENTRICLE FREE WALL PEAK S': 10 CM/S
RIGHT VENTRICLE PEAK SYSTOLIC PRESSURE: 40.9 MMHG

## 2024-05-31 PROCEDURE — 93306 TTE W/DOPPLER COMPLETE: CPT | Performed by: INTERNAL MEDICINE

## 2024-05-31 PROCEDURE — 93306 TTE W/DOPPLER COMPLETE: CPT

## 2024-06-03 DIAGNOSIS — I48.91 ATRIAL FIBRILLATION WITH RVR (MULTI): ICD-10-CM

## 2024-06-03 RX ORDER — APIXABAN 5 MG/1
2.5 TABLET, FILM COATED ORAL 2 TIMES DAILY
Qty: 30 TABLET | Refills: 0 | Status: SHIPPED | OUTPATIENT
Start: 2024-06-03

## 2024-06-04 ENCOUNTER — APPOINTMENT (OUTPATIENT)
Dept: PHYSICAL THERAPY | Facility: CLINIC | Age: 79
End: 2024-06-04
Payer: MEDICARE

## 2024-06-07 ASSESSMENT — ENCOUNTER SYMPTOMS
DEPRESSION: 0
LOSS OF SENSATION IN FEET: 0
OCCASIONAL FEELINGS OF UNSTEADINESS: 0

## 2024-06-12 DIAGNOSIS — R00.1 BRADYCARDIA ON ECG: ICD-10-CM

## 2024-06-12 DIAGNOSIS — Z95.0 PACEMAKER: Primary | ICD-10-CM

## 2024-06-14 ENCOUNTER — APPOINTMENT (OUTPATIENT)
Dept: CARDIOLOGY | Facility: CLINIC | Age: 79
End: 2024-06-14
Payer: MEDICARE

## 2024-06-14 DIAGNOSIS — Z95.810 ICD (IMPLANTABLE CARDIOVERTER-DEFIBRILLATOR) IN PLACE: ICD-10-CM

## 2024-06-14 PROCEDURE — 93283 PRGRMG EVAL IMPLANTABLE DFB: CPT | Performed by: NURSE PRACTITIONER

## 2024-06-14 PROCEDURE — 93283 PRGRMG EVAL IMPLANTABLE DFB: CPT

## 2024-06-17 ENCOUNTER — TELEPHONE (OUTPATIENT)
Dept: PRIMARY CARE | Facility: CLINIC | Age: 79
End: 2024-06-17
Payer: MEDICARE

## 2024-06-17 NOTE — TELEPHONE ENCOUNTER
She needs a letter to Dr. Bowman to state it is ok for her to come off of her blood thinning medication 2 days before the Kyphoplasty. Please advise when done.

## 2024-06-19 ENCOUNTER — APPOINTMENT (OUTPATIENT)
Dept: PHYSICAL THERAPY | Facility: CLINIC | Age: 79
End: 2024-06-19
Payer: MEDICARE

## 2024-06-19 NOTE — PROGRESS NOTES
Subjective   Patient ID: Shannan Tee is a 79 y.o. female who presents for her 1 month follow up multiple medical conditions       She is scheduled for kyphoplasty with spine fermín T6- 7 on 7/24/2024 with Dr Gracie Bowman taught her to breath with her abdomen. If she breaths this way she has less pain     She has been gardening but she is not running into things  She has ecchymosis on her legs and arms and scratches     She admits she is not hydrating well with the onset of the heat     She is not sure she is in a -fib  Today her heart felt different for a period but then it resolved   She had her pacemaker reset last week.  She was told she was in a- fib 3/8/2024 and 3/29/2024 and she did not know it         HEALTH:  PAP no longer needs to repeat   Mammo 1/08, 2016, 5/19, 8/20, 8/21, no longer needs to repeat   BD 1/08 T-0.9, 4/16 T-1.9, 5/19 T-2.1, 8/21 T-2.5, 8/23 T-3.1.   Colon 6/08 adenoma, 5/12 and declines repeat. Declines Cologuard   EKG 11/17, 8/18, 6/19, 9/20, 5/21, 7/21, 5/22, 8/22, 4/23, 3/24, 5/24    ECHO 9/17, 5/2021, 3/2023   Carotids 9/17 <50%   Urine 2017 , 1/19   Hep C 8/18 -  FLU 1/23  TDAP 2013, 6/23 with injury   Arexvy recommended and will consider   Prevnar 5/17  Pneumo 1/2011  Shingrix recommend and declines   SARS CVD vaccine declines   Ophth Seen in 2/2024 for corneal abrasion left eye. She had cataract extraction in 2018. She is not wearing glasses except for night driving. No glaucoma or MD.  Copy of her Advanced Directives scanned in her chart 3/2023       Review of Systems  All systems negative except those listed in the HPI      Objective   Visit Vitals  /60 (BP Location: Left arm, Patient Position: Sitting)   Pulse 98   Temp 36.3 °C (97.3 °F) (Temporal)    Body mass index is 18.78 kg/m².      Visit Vitals  /60 (BP Location: Left arm, Patient Position: Sitting)   Pulse 74   Temp 36.3 °C (97.3 °F) (Temporal)    Recheck HR by Dr Sunshine Warren MD 6/20/2024      Physical Exam  Vitals reviewed.   Constitutional:       Appearance: Normal appearance. She is normal weight.   HENT:      Head: Normocephalic.      Right Ear: Tympanic membrane, ear canal and external ear normal.      Left Ear: Tympanic membrane, ear canal and external ear normal.      Nose: Nose normal.      Mouth/Throat:      Pharynx: Oropharynx is clear.   Eyes:      Conjunctiva/sclera: Conjunctivae normal.   Cardiovascular:      Rate and Rhythm: Normal rate and regular rhythm.      Pulses: Normal pulses.      Heart sounds: Normal heart sounds.      Comments: HR 74, regular  Pulmonary:      Effort: Pulmonary effort is normal.      Breath sounds: Normal breath sounds.   Abdominal:      General: Bowel sounds are normal.      Palpations: Abdomen is soft.      Comments: Borborygmus     Musculoskeletal:         General: Normal range of motion.      Cervical back: Normal range of motion and neck supple.      Comments: tenderness at T 11- 12, no tenderness to the ribs    Skin:     General: Skin is warm.   Neurological:      General: No focal deficit present.      Mental Status: She is alert and oriented to person, place, and time.   Psychiatric:         Mood and Affect: Mood normal.         Behavior: Behavior normal.         Thought Content: Thought content normal.         Judgment: Judgment normal.       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 Son passed on 6/27/2023 from cirrhosis.      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  Recommend she be careful when out in the yard   She can look into OTC arnica cream to help resolve the ecchymosis      Acute T spine compression fracture : On exam: tenderness at T 11- 12, no tenderness to the ribs 6/24.    She is here for upper back pain.  She went to reach for her bathrobe and heard something click in her upper back   Her upper back is very painful. She has a hard time breathing    She takes Tylenol and using Lidocaine to help her pain    She can not do an MRI due to having a defibrillator   Recommend she continue using the Lidocaine patches as directed on packaging   She can continue OTC Tylenol as directed on packaging    Xray L/S 5/24 Advanced lumbar degenerative changes/scoliosis similar to previous   Continue Fosamax 70 mg weekly and calcitonin NS daily.   She saw Dr Bowman in 5/2024. She is scheduled for kyphoplasty with spine fermín T6- 7 on 7/24/2024 with Dr Bowman    -- she has not talked with her dentist about starting Fosamax. Recommend she talk with her dentist and make them aware she is on Fosamax. Explained if she has any teeth that need extracting she will have to stop Fosamax for 2 month prior to having it done.      Seen in the ED on 3/21/2024 for pneumonia and T7 compression fracture :    She has nonischemic EKG therefore there is low suspicion for ACS.   CT angio chest for PE and CT abdomen pelvis was obtained negative for PE or thoracic aortic dissection although aneurysmal dilatation of the ascending thoracic  aorta was noted as well as a descending thoracic aortic aneurysm and interstitial edema small bilateral effusions and patchy airspace density right upper lobe as well as mediastinal adenopathy and pulmonary nodule noted   CT abdomen pelvis largely negative for acute findings   She will be treated for pneumonia with Augmentin and doxycycline given first doses here. Was given a copy of her CT results and was advised the importance for follow-up for pulmonary nodule and for aortic dilatation.       She quit smoking 5/12/2024   Her son did laser treatment for smoking.   She can look into laser or hypnosis also a good option   Nicotine dependence I spent more than 3 minutes counseling patient about the need for smoking cessation and how I can support efforts when patient is ready to quit. Discussed nicotine replacement therapy, Varenicline, Buproprion, hypnosis, support groups and acupuncture as potential options. Patient currently has no signs or symptoms of tobacco related disease.  (Dx code F17.2 or Z87.891)(Billing code 83812 or 38859)        Her hearing is not as good as it used to be   She declines appt with audiology for now 1/2023      Her weight/ BMI is in normal range in office, recommend she maintain  Her weight is 106 and BMI 18.78 IO 6/2024  She is not eating well but had a nice meal last night  She sometimes has emesis postprandial       HTN: BP low normal. Recommend when she stand she count to 10 prior to taking her first steps. Recommend increased sodium in her diet   Continue atenolol 25 mg daily and hydroxyzine 25 mg TID   ECHO 5/2021 was normal but poor functional capacity   Stress test normal 5/2021  EKG 11/2022 showed a fib with RVR  Recommend she monitor her BP at home and call with elevated readings.    She saw Tracey Schwab in 8/2022 and discontinued lisinopril  She cancelled her appt with Dr Edward on 5/17/2024 and rescheduled for 11/7/2024     Acute respiratory failure and cardiac tamponade.    Admitted on 3/30/2023 and discharged on 4/4/2023  S/p percutaneous pericardiocentesis on 3/30/2023.   Approximately 400 to 500 cc of blood removed from the pericardial sac.  She presented on 3/30/2023 for elective outpatient ICD placement which was complicated by cardiac tamponade and acute hypoxic respiratory failure.   She saw Dr Robles in 4/2023. Recommend seeing Dr Edward, Dr Cohen or Dr Perez    Recommend and orders placed for cardiology 3/2024- recommend Dr Edward    She cancelled her appt with Dr Edward on 5/17/2024 and rescheduled for 11/7/2024     Long standing atrial Fib with prior TIA in 9/2017: On exam: she is not in a- fib 6/24. She is not sure she is in a -fib. Today her heart felt different for a period but then it resolved. She had her pacemaker reset last week. She was told she was in a- fib 3/8/2024 and 3/29/2024 and she did not know it   - EP did an implantable loop on 11/18/2022     - S/p DC shock and dual chamber ICD interrogation and reporgramming on 4/20/2023 with Dr Perez   Continue carvedilol 6.25 mg BID and amiodarone 200 mg daily  Continue spironolactone 25 mg daily and atenolol 25 mg daily  Continue Eliquis 2.5 mg BID and hydroxyzine 25 mg BID  EKG 4/2023: Demand pacemaker with underlying A-fib, rate of 96 bpm, QTc 477 ms. No ST elevations or depressions, no acute ischemic injury pattern. T wave inversions in lateral leads are new compared to prior EKGs.  Carotid duplex: <50% stenosis bilateral vertebra arteries patent with antegrade flow  She was on Pradaxa then Eliquis since 9/17 after she was admitted with TIA 9/17   She cancelled her appt with Dr Edward on 5/17/2024 and rescheduled for 11/7/2024      Middle cerebral aneurysm right side :  She needs CT angio and has to schedule since 2 mm only      We spent 15 minutes face to face discussing her cardiac risks.   We discussed the patients cardiovascular risk. If needed, lifestyle modifications recommended including: behavioral therapies  of nutrition choices, exercise and eliminate habits that are contributing to their cardiac risk. We agreed to a plan to decrease his cardiovascular risks. Discussed ASA. Reviewed Guidelines and approved recommendations made to patient.   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   Dr Pina did EBUS on 12/19/2022 showed Interval increase in size of right middle lobe mass when compared with the previous CT from 08/23/2021, concerning for neoplastic etiology. Interval increase in size of multiple mediastinal lymph nodes as detailed above, concerning for metastatic lymphadenopathy. Moderate upper lobe predominant emphysematous changes.   Pathology results 12/2022 showed malignant cells derived from SCC   -treated with SBRT (5 treatments) from 1/30 to 2/3/2022  -5/8/2023 PET showed interval decrease in size of RML mass with mild residual activity  CT 5/14/2024 reviewed-mild to moderate emphysematous changes in the lungs with upper lobe predominance; previous bilateral pleural effusions have resolved; previous interstitial edema has resolved; areas of infiltrative density in anteromedial mid to lower right upper lobe have resolved; there is persistent pleural and parenchymal density with mild bronchiectasis in anteromedial mid to lower right middle lobe unchanged; mild and grossly stable central mediastinal adenopathy with lymph nodes measuring  10 mm or less;   She saw [facility] in 5/2024 for Rt lung cancer. RTC in 6 months    She Dr Vladimir in 5/2024     CKD: Cr 1.6 on labs in 3/2024.   Recommend she avoid all NSAIDs   We will continue to monitor       LE edema:  Continue furosemide 40 mg daily   Monitor daily sodium intake  Elevate legs for 45 minutes in the afternoons   Elevate legs as much as possible during the day      Situational depression:    She is still grieving the loss of her son in 2023  Her son passed on 6/27/2023 from cirrhosis.    She admits she has depression about her sons passing.    She is happy when she gets to see her grandchildren     Insomnia:  She stopped trazodone.  She is sleeping better      Constipation:   Explained systemic medications that are contributing to her constipation   She is to avoid straining with bowel movement   Continue Mg 200 mg daily.  She can add a stool softener when needed      Neurogenic claudication BLE: she is not walking much due to the pain in her legs   Aortogram 7/2021 with lower extremity angiography showed critical proximal right iliac, renal artery stenosis of about 90% or more with heavy calcifications, completely occluded left SFA throughout its length, and a focal mid right SFA stenosis of about 90% had iliac artery stenting via the right femoral approach without complication   MRI of L/S 11/2021 did not show stenosis   EMG studies 11/2021 were normal   She failed gabapentin   Continue cilostazol 50 mg daily  She restarted PT but on the 3 rd session she pulled a hamstring     She saw Dr Mota in 11/2021 and feels she has PAD versus peripheral neuropathy     She saw Dr Winn in 2/2022 and recommend continued medical management   Recommend and orders placed for Dr Zack Das for evaluation 3/2024   She has an appt with Dr Zack Haider on 4/30/2024        Spinal Stenosis: her back pain is chronic   She should wait on any spine surgery until pain is consistent and intolerable.   Xray  L/S 2020 showed severe multilevel spondylosis worse at L4- 5 and mild anterolisthesis of L3 on L4 and retrolisthesis of L1 on L2   CT 2024 prominent compression fracture at T7 progressed since 3/21/2024 with mild paraspinal soft tissue swelling; there is also subtle interval erosion of anterior inferior aspect of the T6 vertebral body    She will continue to follow with Dr Almanzar and Dr Porras   She has done PT and chiropractor in the past and declines to do more   Recommend she look into a girdle to help with posture and back pain   She is scheduled for kyphoplasty with spine efrmín T6- 7 on 2024 with Dr Bowman       Vitamin D def: levels 15 on labs in 3/2024  Continue scripted Vitamin D 50K UT daily       She has intermittent cold sores:  She is no longer taking acyclovir      Mammo in 2021 was normal. She declines mammo now due to extensive work up and imaging for SCC lung cancer.   She had PET/ CT in 2023.   Breast exam normal 2023     BD in 2023 T-3.1. Explained she is osteoporosis stage.   Recommend she begin OTC Ca 600 mg BID, scripted Vitamin D 50 K UT weekly and eat 2 servings of calcium enriched foods daily.   Discussed importance of weight bearing exercises.    Continue Fosamax 70 mg weekly   Prescription sent in for calcitonin NS to use daily. Explained the proper way to use calcitonin      Colonoscopy with Dr Jain in  was normal.  She knows a couple of people who  during colonoscopy and she is afraid to repeat it. She declines Cologuard because her sister had false positive results.  She declines colonoscopy and Cologuard 2021     Ophth: She is going to make an appt with ophthalmology for an eye exam 3/2024  Seen in 2024 for corneal abrasion left eye. She had cataract extraction in 2018. She is not wearing glasses except for night driving. No glaucoma or MD.     She has a LW and MPOA.  Copy of her Advanced Directives scanned in her chart 3/2023     Hep C  -  FLU  1/12/2023  TDAP 2013, 6/7/2023 with injury   Arexvy recommended and will consider   Prevnar 5/17  Pneumo 1/2011  Zostavax never and declines   Shingrix recommend and declines   SARS CVD vaccine declines    Disability placard given 5/2023 for 5 years      Some elements in the chart were copied from Dr. Warren's last office visit with patient.   Notes have been updated where appropriate, and reflect my current medical decision making from today.       RTC in 4 months for follow up or sooner if needed   (MCR due 3/2025)     Scribe Attestation  By signing my name below, IJessica , Scribe   attest that this documentation has been prepared under the direction and in the presence of Sunshine Warren, MDPatient was identified as a fall risk. Risk prevention instructions provided

## 2024-06-20 ENCOUNTER — APPOINTMENT (OUTPATIENT)
Dept: PRIMARY CARE | Facility: CLINIC | Age: 79
End: 2024-06-20
Payer: MEDICARE

## 2024-06-20 VITALS
WEIGHT: 106 LBS | OXYGEN SATURATION: 96 % | TEMPERATURE: 97.3 F | HEIGHT: 63 IN | SYSTOLIC BLOOD PRESSURE: 100 MMHG | HEART RATE: 74 BPM | BODY MASS INDEX: 18.78 KG/M2 | DIASTOLIC BLOOD PRESSURE: 60 MMHG

## 2024-06-20 DIAGNOSIS — S22.060G COMPRESSION FRACTURE OF T7 VERTEBRA WITH DELAYED HEALING: ICD-10-CM

## 2024-06-20 DIAGNOSIS — C34.31 MALIGNANT NEOPLASM OF LOWER LOBE OF RIGHT LUNG (MULTI): ICD-10-CM

## 2024-06-20 DIAGNOSIS — I73.9 PAD (PERIPHERAL ARTERY DISEASE) (CMS-HCC): ICD-10-CM

## 2024-06-20 DIAGNOSIS — N18.4 CHRONIC RENAL DISEASE, STAGE IV (MULTI): ICD-10-CM

## 2024-06-20 DIAGNOSIS — E78.5 DYSLIPIDEMIA: ICD-10-CM

## 2024-06-20 DIAGNOSIS — R23.3 BRUISING TENDENCY: ICD-10-CM

## 2024-06-20 DIAGNOSIS — R00.1 JUNCTIONAL BRADYCARDIA: ICD-10-CM

## 2024-06-20 DIAGNOSIS — I48.0 PAROXYSMAL A-FIB (MULTI): ICD-10-CM

## 2024-06-20 DIAGNOSIS — I10 BENIGN ESSENTIAL HYPERTENSION: Primary | ICD-10-CM

## 2024-06-20 DIAGNOSIS — J43.9 PULMONARY EMPHYSEMA, UNSPECIFIED EMPHYSEMA TYPE (MULTI): ICD-10-CM

## 2024-06-20 DIAGNOSIS — M79.2 NEUROPATHIC PAIN: ICD-10-CM

## 2024-06-20 DIAGNOSIS — N18.31 STAGE 3A CHRONIC KIDNEY DISEASE (MULTI): ICD-10-CM

## 2024-06-20 DIAGNOSIS — I73.9 CLAUDICATION, INTERMITTENT (CMS-HCC): ICD-10-CM

## 2024-06-20 PROCEDURE — 99214 OFFICE O/P EST MOD 30 MIN: CPT | Performed by: INTERNAL MEDICINE

## 2024-06-20 PROCEDURE — 3074F SYST BP LT 130 MM HG: CPT | Performed by: INTERNAL MEDICINE

## 2024-06-20 PROCEDURE — 1159F MED LIST DOCD IN RCRD: CPT | Performed by: INTERNAL MEDICINE

## 2024-06-20 PROCEDURE — 1160F RVW MEDS BY RX/DR IN RCRD: CPT | Performed by: INTERNAL MEDICINE

## 2024-06-20 PROCEDURE — 1157F ADVNC CARE PLAN IN RCRD: CPT | Performed by: INTERNAL MEDICINE

## 2024-06-20 PROCEDURE — 3078F DIAST BP <80 MM HG: CPT | Performed by: INTERNAL MEDICINE

## 2024-06-20 PROCEDURE — 1123F ACP DISCUSS/DSCN MKR DOCD: CPT | Performed by: INTERNAL MEDICINE

## 2024-06-20 PROCEDURE — 1036F TOBACCO NON-USER: CPT | Performed by: INTERNAL MEDICINE

## 2024-06-24 ENCOUNTER — APPOINTMENT (OUTPATIENT)
Dept: CARDIOLOGY | Facility: CLINIC | Age: 79
End: 2024-06-24
Payer: MEDICARE

## 2024-06-26 ENCOUNTER — APPOINTMENT (OUTPATIENT)
Dept: PHYSICAL THERAPY | Facility: CLINIC | Age: 79
End: 2024-06-26
Payer: MEDICARE

## 2024-06-30 DIAGNOSIS — L29.9 ITCHING: ICD-10-CM

## 2024-07-01 RX ORDER — HYDROXYZINE HYDROCHLORIDE 25 MG/1
25 TABLET, FILM COATED ORAL 2 TIMES DAILY
Qty: 90 TABLET | Refills: 0 | Status: SHIPPED | OUTPATIENT
Start: 2024-07-01

## 2024-07-08 ENCOUNTER — APPOINTMENT (OUTPATIENT)
Dept: PHYSICAL THERAPY | Facility: CLINIC | Age: 79
End: 2024-07-08
Payer: MEDICARE

## 2024-07-08 DIAGNOSIS — I48.91 ATRIAL FIBRILLATION WITH RVR (MULTI): ICD-10-CM

## 2024-07-08 DIAGNOSIS — S22.060G COMPRESSION FRACTURE OF T7 VERTEBRA WITH DELAYED HEALING, SUBSEQUENT ENCOUNTER: ICD-10-CM

## 2024-07-08 PROCEDURE — 97110 THERAPEUTIC EXERCISES: CPT | Mod: GP

## 2024-07-08 NOTE — PROGRESS NOTES
Physical Therapy    Physical Therapy Treatment    Patient Name: Edda Tee  MRN: [id number]    Today's Date: 2024  Time Calculation  Start Time: 1635  Stop Time: 1658  Time Calculation (min): 23 min     PT Therapeutic Procedures Time Entry  Therapeutic Exercise Time Entry: 23                 Visit #2  0% coins, no ded, $3000 oop ($480.38 met), $30 copay, bmn audrey yr, no PA     Assessment:  Pt showed good recall and performance of initial HEP. Issued new additions of paloff press and box breathing for progress with core strengthening and breathing performance. Patient will follow-up in 1-2 weeks before undergoing surgical kyphoplasty on 24.     Plan:   1x per week 20 v    Current Problem  1. Compression fracture of T7 vertebra with delayed healing, subsequent encounter  Follow Up In Physical Therapy              Subjective    Pt notes improvement in breathing with performance of diaphragmatic breathing and paced breathing with standing rows and extensions from previous PT session.       Pain   4/10 T-spine    Objective     Range of Motion    Thoracolumbar       Flexion: 25%. Flexion detail: pain and guarding.     Extension: 50%. Extension detail: pain and guarding.       Right      Lateral bendin%. Lateral bending detail: pain and guarding.       Lateral rotation: 50%. Lateral rotation detail: pain and guarding.       Left      Lateral bendin%. Lateral bending detail: pain and guarding.       Lateral rotation: 50%. Lateral rotation detail: pain and guarding.          Outcome Measures:  Other Measures  Oswestry Disablity Index (PIYUSH): 36%      Treatments:  Rows with YTB with paced breathing 2x10  -performed diaphragmatic breathing after  Shoudler extension with YTB 2x10  -performed diaphragmatic breathing after  Paloff Press wit  Hooklying diaphragmatic breathing 3 min  Standing diaphragmatic breathing 2 min   Seated Box Breathing  -4 sec inhale  -4 sec hold  -4 sec exhale   -4 sec hold  Repeat  steps for 3 min        Goals:  Patient will be independent with HEP.     Patient will improve PIYUSH score to      Patient will improve hip extension strength to >/=4+/5 for improved lumbopelvic stability with ambulation and ADL performance.     Patient will improve hip abduction strength to >/4+/5 for improved proximal hip stability and SL stability with ambulation.     Patient will demonstrate trunk AROM that is WNL and painless in all planes.     Patient will report 0/10 back pain with ADL performance.

## 2024-07-09 RX ORDER — APIXABAN 5 MG/1
2.5 TABLET, FILM COATED ORAL 2 TIMES DAILY
Qty: 30 TABLET | Refills: 3 | Status: SHIPPED | OUTPATIENT
Start: 2024-07-09

## 2024-07-10 DIAGNOSIS — L29.9 ITCHING: ICD-10-CM

## 2024-07-10 RX ORDER — HYDROXYZINE HYDROCHLORIDE 25 MG/1
25 TABLET, FILM COATED ORAL 2 TIMES DAILY
Qty: 90 TABLET | Refills: 0 | Status: SHIPPED | OUTPATIENT
Start: 2024-07-10

## 2024-07-11 ENCOUNTER — APPOINTMENT (OUTPATIENT)
Dept: PHYSICAL THERAPY | Facility: CLINIC | Age: 79
End: 2024-07-11
Payer: MEDICARE

## 2024-07-24 ENCOUNTER — HOSPITAL ENCOUNTER (OUTPATIENT)
Dept: RADIOLOGY | Facility: HOSPITAL | Age: 79
Discharge: HOME | End: 2024-07-24
Payer: MEDICARE

## 2024-07-24 ENCOUNTER — HOSPITAL ENCOUNTER (OUTPATIENT)
Dept: PAIN MEDICINE | Facility: CLINIC | Age: 79
Discharge: HOME | End: 2024-07-24
Payer: MEDICARE

## 2024-07-24 VITALS
TEMPERATURE: 96.4 F | RESPIRATION RATE: 18 BRPM | SYSTOLIC BLOOD PRESSURE: 140 MMHG | DIASTOLIC BLOOD PRESSURE: 78 MMHG | OXYGEN SATURATION: 96 % | HEART RATE: 72 BPM

## 2024-07-24 DIAGNOSIS — S22.060G COMPRESSION FRACTURE OF T7 VERTEBRA WITH DELAYED HEALING, SUBSEQUENT ENCOUNTER: ICD-10-CM

## 2024-07-24 PROCEDURE — 2500000005 HC RX 250 GENERAL PHARMACY W/O HCPCS: Performed by: PAIN MEDICINE

## 2024-07-24 PROCEDURE — 3700000012 HC SEDATION LEVEL 5+ TIME - INITIAL 15 MINUTES 5/> YEARS: Performed by: PAIN MEDICINE

## 2024-07-24 PROCEDURE — 99152 MOD SED SAME PHYS/QHP 5/>YRS: CPT | Performed by: PAIN MEDICINE

## 2024-07-24 PROCEDURE — A4649 SURGICAL SUPPLIES: HCPCS | Performed by: PAIN MEDICINE

## 2024-07-24 PROCEDURE — 2500000004 HC RX 250 GENERAL PHARMACY W/ HCPCS (ALT 636 FOR OP/ED): Performed by: PAIN MEDICINE

## 2024-07-24 PROCEDURE — 2780000003 HC OR 278 NO HCPCS: Performed by: PAIN MEDICINE

## 2024-07-24 PROCEDURE — 3700000013 HC SEDATION LEVEL 5+ TIME - EACH ADDITIONAL 15 MINUTES: Performed by: PAIN MEDICINE

## 2024-07-24 PROCEDURE — 22513 PERQ VERTEBRAL AUGMENTATION: CPT | Performed by: PAIN MEDICINE

## 2024-07-24 PROCEDURE — 2550000001 HC RX 255 CONTRASTS: Performed by: PAIN MEDICINE

## 2024-07-24 PROCEDURE — C1776 JOINT DEVICE (IMPLANTABLE): HCPCS | Performed by: PAIN MEDICINE

## 2024-07-24 RX ORDER — MIDAZOLAM HYDROCHLORIDE 1 MG/ML
2 INJECTION, SOLUTION INTRAMUSCULAR; INTRAVENOUS ONCE
Status: DISCONTINUED | OUTPATIENT
Start: 2024-07-24 | End: 2024-07-25 | Stop reason: HOSPADM

## 2024-07-24 RX ORDER — SODIUM CHLORIDE, SODIUM LACTATE, POTASSIUM CHLORIDE, CALCIUM CHLORIDE 600; 310; 30; 20 MG/100ML; MG/100ML; MG/100ML; MG/100ML
100 INJECTION, SOLUTION INTRAVENOUS CONTINUOUS
Status: DISCONTINUED | OUTPATIENT
Start: 2024-07-24 | End: 2024-07-25 | Stop reason: HOSPADM

## 2024-07-24 RX ORDER — FENTANYL CITRATE 50 UG/ML
25 INJECTION, SOLUTION INTRAMUSCULAR; INTRAVENOUS
Status: DISCONTINUED | OUTPATIENT
Start: 2024-07-24 | End: 2024-07-25 | Stop reason: HOSPADM

## 2024-07-24 RX ORDER — LIDOCAINE HYDROCHLORIDE 10 MG/ML
INJECTION, SOLUTION EPIDURAL; INFILTRATION; INTRACAUDAL; PERINEURAL AS NEEDED
Status: COMPLETED | OUTPATIENT
Start: 2024-07-24 | End: 2024-07-24

## 2024-07-24 RX ORDER — CEFAZOLIN 1 G/1
1 INJECTION, POWDER, FOR SOLUTION INTRAVENOUS ONCE
Status: SHIPPED | OUTPATIENT
Start: 2024-07-24

## 2024-07-24 RX ORDER — FENTANYL CITRATE 50 UG/ML
INJECTION, SOLUTION INTRAMUSCULAR; INTRAVENOUS AS NEEDED
Status: COMPLETED | OUTPATIENT
Start: 2024-07-24 | End: 2024-07-24

## 2024-07-24 RX ORDER — CEFAZOLIN SODIUM 2 G/100ML
2 INJECTION, SOLUTION INTRAVENOUS ONCE
Status: COMPLETED | OUTPATIENT
Start: 2024-07-24 | End: 2024-07-24

## 2024-07-24 RX ORDER — MIDAZOLAM HYDROCHLORIDE 1 MG/ML
INJECTION, SOLUTION INTRAMUSCULAR; INTRAVENOUS AS NEEDED
Status: COMPLETED | OUTPATIENT
Start: 2024-07-24 | End: 2024-07-24

## 2024-07-24 ASSESSMENT — COLUMBIA-SUICIDE SEVERITY RATING SCALE - C-SSRS
1. IN THE PAST MONTH, HAVE YOU WISHED YOU WERE DEAD OR WISHED YOU COULD GO TO SLEEP AND NOT WAKE UP?: NO
2. HAVE YOU ACTUALLY HAD ANY THOUGHTS OF KILLING YOURSELF?: NO
6. HAVE YOU EVER DONE ANYTHING, STARTED TO DO ANYTHING, OR PREPARED TO DO ANYTHING TO END YOUR LIFE?: NO

## 2024-07-24 ASSESSMENT — PAIN - FUNCTIONAL ASSESSMENT: PAIN_FUNCTIONAL_ASSESSMENT: 0-10

## 2024-07-24 ASSESSMENT — PAIN SCALES - GENERAL: PAINLEVEL_OUTOF10: 6

## 2024-07-24 NOTE — DISCHARGE INSTRUCTIONS
Post-Surgery instructions:    Your pain may not be gone immediately after the procedure--it usually takes few days to start working.  It is expected to have some incisional pain at the site of the procedure       Activity: Avoid strenuous activity for 1 to 2 days   Bandages: Keep the bandage intact for few days     Showering/Bathing: You may shower after bandage is removed prior to that you may take some sponge bath keep the area of the wound dry and clean    Follow up: CALL OFFICE to make an appointment to follow-up in 2 weeks    Call the doctor immediately: if you notice:     Excessive bleeding from procedure site (brisk bright red bleeding from the site or bleeding that soaks the bandages or does not stop)   Severe headache  Inability to walk, leg or arm weakness or numbness that is worse after the procedure   Uncontrolled pain   New urinary or fecal incontinence   Signs of infection: Fever above 101.5F, redness, swelling, pus or drainage from the site

## 2024-07-24 NOTE — H&P
"History Of Present Illness  Edda Tee \"Shannan\" is a 79 y.o. female presenting with thoracic pain due to a compression fracture at T7      Past Medical History  Past Medical History:   Diagnosis Date   • Cellulitis of right lower limb 06/29/2020    Cellulitis of both feet   • ERRONEOUS ENCOUNTER--DISREGARD    • Low back pain, unspecified 08/18/2022    Acute bilateral low back pain without sciatica   • Nicotine dependence, cigarettes, uncomplicated 07/26/2022    Tobacco dependence due to cigarettes   • Other vascular myelopathies (Multi) 12/08/2021    Neurogenic claudication   • Pain in unspecified ankle and joints of unspecified foot 08/29/2020    Ankle pain   • Personal history of other drug therapy     History of anticoagulant therapy   • Personal history of other endocrine, nutritional and metabolic disease 08/18/2022    History of hyperkalemia   • Personal history of other infectious and parasitic diseases 01/29/2018    History of herpes labialis   • Personal history of other specified conditions 09/04/2020    History of palpitations   • Radiculopathy, lumbar region 12/08/2021    Lumbar radiculopathy   • Radiculopathy, lumbar region 07/02/2021    Left lumbar radiculopathy   • Tobacco abuse counseling 05/06/2022    Tobacco abuse counseling   • Transient cerebral ischemic attack, unspecified     TIA on medication   • Unspecified mononeuropathy of unspecified lower limb 10/01/2021    Lower extremity neuropathy   • Urinary tract infection, site not specified 08/31/2020    Acute UTI     Surgical History  Past Surgical History:   Procedure Laterality Date   • CT AORTA AND BILATERAL ILIOFEMORAL RUNOFF ANGIOGRAM W AND/OR WO IV CONTRAST  3/8/2023    CT AORTA AND BILATERAL ILIOFEMORAL RUNOFF ANGIOGRAM W AND/OR WO IV CONTRAST STJ CT   • OTHER SURGICAL HISTORY  11/22/2022    Loop recorder insertion     Social History  She reports that she quit smoking about 18 months ago. Her smoking use included cigarettes. She has been " exposed to tobacco smoke. She has never used smokeless tobacco. She reports that she does not drink alcohol and does not use drugs.    Family History  Family History   Problem Relation Name Age of Onset   • Hypertension Mother     • Diabetes Mother     • Stroke Father          Allergies  Allergies   Allergen Reactions   • Gabapentin Confusion     Was a little confused    • Sulfa (Sulfonamide Antibiotics) Hives   • Sulfanilamide (Bulk) Hives   • Sulfasalazine Hives     Review of Systems   All 13 systems were reviewed and are within normal levels except as noted below or per HPI. Positive and pertinent negative responses are noted below or in the HPI   Denied any fever or chills. No weight loss and no night sweats. No cough or sputum production. No diarrhea   No constipation  No bladder and bowel incontinence and no other changes in bladder and bowel. No skin changes.   Denied opioids diversion and abuse and denies alcoholism. Denies overuse of  pain medications.    Physical Exam       Past medical history no interval changes has been noted    On physical examination    General   Alert, oriented x3 pleasant and cooperative. Does not look in any major distress.    HEENT  Pupils normal in size. Ears, nose, mouth, and throat appear to be in normal condition.  Head atraumatic      No signs of sedation or signs of withdrawal apparent.    Psychiatric   No signs of depression apparent.    Neuro   No focal neurological deficit apparent. Ambulation at baseline.      Respiratory  No respiratory distress     Abdomen  no distention     Skin  No skin markings supportive of recent IV drug usage .    Cardiovascular  Regular rate and rhythm     Last Recorded Vitals  Blood pressure 166/77, pulse 67, temperature 35.8 °C (96.4 °F), temperature source Temporal, resp. rate 18, SpO2 95%.    Assessment/Plan   Here for kyphoplasty .     Matthew Bowman MD

## 2024-08-12 ENCOUNTER — OFFICE VISIT (OUTPATIENT)
Dept: PAIN MEDICINE | Facility: CLINIC | Age: 79
End: 2024-08-12
Payer: MEDICARE

## 2024-08-12 DIAGNOSIS — M51.36 LUMBAR DEGENERATIVE DISC DISEASE: Primary | ICD-10-CM

## 2024-08-12 PROCEDURE — 1157F ADVNC CARE PLAN IN RCRD: CPT | Performed by: PAIN MEDICINE

## 2024-08-12 PROCEDURE — 99214 OFFICE O/P EST MOD 30 MIN: CPT | Performed by: PAIN MEDICINE

## 2024-08-12 PROCEDURE — G2211 COMPLEX E/M VISIT ADD ON: HCPCS | Performed by: PAIN MEDICINE

## 2024-08-12 PROCEDURE — 1036F TOBACCO NON-USER: CPT | Performed by: PAIN MEDICINE

## 2024-08-12 PROCEDURE — 1125F AMNT PAIN NOTED PAIN PRSNT: CPT | Performed by: PAIN MEDICINE

## 2024-08-12 PROCEDURE — 1123F ACP DISCUSS/DSCN MKR DOCD: CPT | Performed by: PAIN MEDICINE

## 2024-08-12 RX ORDER — TRAMADOL HYDROCHLORIDE 50 MG/1
50 TABLET ORAL 2 TIMES DAILY PRN
Qty: 45 TABLET | Refills: 0 | Status: SHIPPED | OUTPATIENT
Start: 2024-08-12 | End: 2024-09-11

## 2024-08-12 ASSESSMENT — PAIN SCALES - GENERAL: PAINLEVEL_OUTOF10: 7

## 2024-08-12 ASSESSMENT — PAIN DESCRIPTION - DESCRIPTORS: DESCRIPTORS: ACHING

## 2024-08-12 ASSESSMENT — PAIN - FUNCTIONAL ASSESSMENT: PAIN_FUNCTIONAL_ASSESSMENT: 0-10

## 2024-08-12 NOTE — H&P
"History Of Present Illness  Edda Tee \"Shannan\" is a 79 y.o. female presenting with thoracic pain s/p T7 kyphoplasty   She continues to describe some pain when she stands up and walk and in a sitting position it seems her pain improved some she also improve her pain when she leans forward she is rating her pain today at a level of 4 out of 10 described some slight improvement with the kyphoplasty but she continues to describe herself as not being comfortable.     Past Medical History  Past Medical History:   Diagnosis Date    Cellulitis of right lower limb 06/29/2020    Cellulitis of both feet    ERRONEOUS ENCOUNTER--DISREGARD     Low back pain, unspecified 08/18/2022    Acute bilateral low back pain without sciatica    Nicotine dependence, cigarettes, uncomplicated 07/26/2022    Tobacco dependence due to cigarettes    Other vascular myelopathies (Multi) 12/08/2021    Neurogenic claudication    Pain in unspecified ankle and joints of unspecified foot 08/29/2020    Ankle pain    Personal history of other drug therapy     History of anticoagulant therapy    Personal history of other endocrine, nutritional and metabolic disease 08/18/2022    History of hyperkalemia    Personal history of other infectious and parasitic diseases 01/29/2018    History of herpes labialis    Personal history of other specified conditions 09/04/2020    History of palpitations    Radiculopathy, lumbar region 12/08/2021    Lumbar radiculopathy    Radiculopathy, lumbar region 07/02/2021    Left lumbar radiculopathy    Tobacco abuse counseling 05/06/2022    Tobacco abuse counseling    Transient cerebral ischemic attack, unspecified     TIA on medication    Unspecified mononeuropathy of unspecified lower limb 10/01/2021    Lower extremity neuropathy    Urinary tract infection, site not specified 08/31/2020    Acute UTI     Surgical History  Past Surgical History:   Procedure Laterality Date    CT AORTA AND BILATERAL ILIOFEMORAL RUNOFF " ANGIOGRAM W AND/OR WO IV CONTRAST  3/8/2023    CT AORTA AND BILATERAL ILIOFEMORAL RUNOFF ANGIOGRAM W AND/OR WO IV CONTRAST STJ CT    OTHER SURGICAL HISTORY  11/22/2022    Loop recorder insertion     Social History  She reports that she quit smoking about 19 months ago. Her smoking use included cigarettes. She has been exposed to tobacco smoke. She has never used smokeless tobacco. She reports that she does not drink alcohol and does not use drugs.    Family History  Family History   Problem Relation Name Age of Onset    Hypertension Mother      Diabetes Mother      Stroke Father          Allergies  Allergies   Allergen Reactions    Gabapentin Confusion     Was a little confused     Sulfa (Sulfonamide Antibiotics) Hives    Sulfanilamide (Bulk) Hives    Sulfasalazine Hives     Review of Systems   All 13 systems were reviewed and are within normal levels except as noted below or per HPI. Positive and pertinent negative responses are noted below or in the HPI   Denied any fever or chills. No weight loss and no night sweats. No cough or sputum production. No diarrhea   No constipation  No bladder and bowel incontinence and no other changes in bladder and bowel. No skin changes.   Denied opioids diversion and abuse and denies alcoholism. Denies overuse of  pain medications.    Physical Exam       Past medical history no interval changes has been noted    On physical examination    General   Alert, oriented x3 pleasant and cooperative. Does not look in any major distress.    HEENT  Pupils normal in size. Ears, nose, mouth, and throat appear to be in normal condition.  Head atraumatic      No signs of sedation or signs of withdrawal apparent.    Psychiatric   No signs of depression apparent.    Neuro   No focal neurological deficit apparent. Ambulation at baseline.      Respiratory  No respiratory distress     Abdomen  no distention     Skin  No skin markings supportive of recent IV drug usage .    Cardiovascular  Regular  rate and rhythm     Last Recorded Vitals  There were no vitals taken for this visit.    Assessment/Plan   79 years old with history and physical examination supportive of compression fracture at the level of the T7 with multiple level degenerative disc disease and disc displacement in the thoracic spine area and multiple level of facet arthropathy in the lower lumbar spine area     Plan  Knowing that the patient currently is continuing to describe significant pain that is affecting her day-to-day activity I recommended for her to be tried on tramadol 50 mg 1 to 2 pills maximum a day she will keep a pain diary and follow-up with the pain clinic within 1 month to reassess her improvement and determine the merits of being maintained on the opiate therapy benefits and risk were discussed with the patient and she would like to proceed.      The above clinical summary has been dictated with voice recognition software. It has not been proofread for grammatical errors, typographical mistakes, or other semantic inconsistencies.    Thank you for visiting our office today. It was our pleasure to take part in your healthcare.     Please do not hesitate to contact the pain clinic after your visit with any questions or concerns at  M-F 8-4 pm       Matthew Bowman M.D.  Medical Director , Division of Pain Medicine [facility]   of Anesthesiology and Pain Medicine  [facility] [place] [address]     Office: [phone]  Fax: [phone]      Matthew Bowman MD

## 2024-08-12 NOTE — PROGRESS NOTES
Subjective   Shannan Tee is a 79 y.o. female who presents for evaluation of her  pain.  The pt is here today for her 2 week follow up post kyphoplasty.     Past Medical History:   Diagnosis Date    Cellulitis of right lower limb 06/29/2020    Cellulitis of both feet    ERRONEOUS ENCOUNTER--DISREGARD     Low back pain, unspecified 08/18/2022    Acute bilateral low back pain without sciatica    Nicotine dependence, cigarettes, uncomplicated 07/26/2022    Tobacco dependence due to cigarettes    Other vascular myelopathies (Multi) 12/08/2021    Neurogenic claudication    Pain in unspecified ankle and joints of unspecified foot 08/29/2020    Ankle pain    Personal history of other drug therapy     History of anticoagulant therapy    Personal history of other endocrine, nutritional and metabolic disease 08/18/2022    History of hyperkalemia    Personal history of other infectious and parasitic diseases 01/29/2018    History of herpes labialis    Personal history of other specified conditions 09/04/2020    History of palpitations    Radiculopathy, lumbar region 12/08/2021    Lumbar radiculopathy    Radiculopathy, lumbar region 07/02/2021    Left lumbar radiculopathy    Tobacco abuse counseling 05/06/2022    Tobacco abuse counseling    Transient cerebral ischemic attack, unspecified     TIA on medication    Unspecified mononeuropathy of unspecified lower limb 10/01/2021    Lower extremity neuropathy    Urinary tract infection, site not specified 08/31/2020    Acute UTI     Past Surgical History:   Procedure Laterality Date    CT AORTA AND BILATERAL ILIOFEMORAL RUNOFF ANGIOGRAM W AND/OR WO IV CONTRAST  3/8/2023    CT AORTA AND BILATERAL ILIOFEMORAL RUNOFF ANGIOGRAM W AND/OR WO IV CONTRAST STJ CT    OTHER SURGICAL HISTORY  11/22/2022    Loop recorder insertion       I have reviewed the nurses notes and am aware of family/social history.     Review of Systems    Objective   Physical Exam    ASSESSMENT:     PLAN:   Discussed  treatment plan with patient.   Call or return to clinic prn if these symptoms worsen or fail to improve as anticipated.     Liz Lott RN

## 2024-09-09 ENCOUNTER — APPOINTMENT (OUTPATIENT)
Dept: PAIN MEDICINE | Facility: CLINIC | Age: 79
End: 2024-09-09
Payer: MEDICARE

## 2024-09-09 VITALS
HEART RATE: 64 BPM | TEMPERATURE: 97.5 F | SYSTOLIC BLOOD PRESSURE: 127 MMHG | DIASTOLIC BLOOD PRESSURE: 64 MMHG | OXYGEN SATURATION: 91 % | RESPIRATION RATE: 18 BRPM

## 2024-09-09 DIAGNOSIS — M47.816 SPONDYLOSIS WITHOUT MYELOPATHY OR RADICULOPATHY, LUMBAR REGION: Primary | ICD-10-CM

## 2024-09-09 PROCEDURE — 1157F ADVNC CARE PLAN IN RCRD: CPT | Performed by: PAIN MEDICINE

## 2024-09-09 PROCEDURE — 99214 OFFICE O/P EST MOD 30 MIN: CPT | Performed by: PAIN MEDICINE

## 2024-09-09 PROCEDURE — 3078F DIAST BP <80 MM HG: CPT | Performed by: PAIN MEDICINE

## 2024-09-09 PROCEDURE — 1123F ACP DISCUSS/DSCN MKR DOCD: CPT | Performed by: PAIN MEDICINE

## 2024-09-09 PROCEDURE — 3074F SYST BP LT 130 MM HG: CPT | Performed by: PAIN MEDICINE

## 2024-09-09 PROCEDURE — G2211 COMPLEX E/M VISIT ADD ON: HCPCS | Performed by: PAIN MEDICINE

## 2024-09-09 RX ORDER — SODIUM CHLORIDE, SODIUM LACTATE, POTASSIUM CHLORIDE, CALCIUM CHLORIDE 600; 310; 30; 20 MG/100ML; MG/100ML; MG/100ML; MG/100ML
20 INJECTION, SOLUTION INTRAVENOUS CONTINUOUS
OUTPATIENT
Start: 2024-09-09

## 2024-09-09 ASSESSMENT — COLUMBIA-SUICIDE SEVERITY RATING SCALE - C-SSRS
6. HAVE YOU EVER DONE ANYTHING, STARTED TO DO ANYTHING, OR PREPARED TO DO ANYTHING TO END YOUR LIFE?: NO
1. IN THE PAST MONTH, HAVE YOU WISHED YOU WERE DEAD OR WISHED YOU COULD GO TO SLEEP AND NOT WAKE UP?: NO
2. HAVE YOU ACTUALLY HAD ANY THOUGHTS OF KILLING YOURSELF?: NO

## 2024-09-09 ASSESSMENT — PAIN DESCRIPTION - DESCRIPTORS
DESCRIPTORS: DULL;ACHING
DESCRIPTORS: DULL;ACHING

## 2024-09-09 ASSESSMENT — PAIN SCALES - GENERAL
PAINLEVEL_OUTOF10: 5 - MODERATE PAIN
PAINLEVEL_OUTOF10: 5 - MODERATE PAIN
PAINLEVEL: 5

## 2024-09-09 ASSESSMENT — PAIN - FUNCTIONAL ASSESSMENT: PAIN_FUNCTIONAL_ASSESSMENT: 0-10

## 2024-09-09 NOTE — PROGRESS NOTES
Kyphoplasty 2 wks ago, here for follow up. Pain is resolved where kypho was done, but has pain in the lower back still

## 2024-09-09 NOTE — H&P
"History Of Present Illness  Edda Tee \"Carrie" is a 79 y.o. female presenting with low back pain   Patient is here today complaining of pain localized in the lower lumbar spine area she received recently a kyphoplasty at the level of T7 and she is not too concerned about the upper part but concerned about the lower part she had a recent x-ray confirming scoliosis and advanced degenerative disc disease she had physical therapy recently with no significant improvement  Was tried on ibuprofen Advil Aleve over-the-counter nonsteroidal anti-inflammatory without any significant improved.  Writing today her pain at a level of 5 out of 10 describing some improvement if she leans a little bit forward and pain is aggravated by standing.  Patient filled a Oswestry low back disability questionnaire and it was scored at 27  Past Medical History  Past Medical History:   Diagnosis Date    Cellulitis of right lower limb 06/29/2020    Cellulitis of both feet    ERRONEOUS ENCOUNTER--DISREGARD     Low back pain, unspecified 08/18/2022    Acute bilateral low back pain without sciatica    Nicotine dependence, cigarettes, uncomplicated 07/26/2022    Tobacco dependence due to cigarettes    Other vascular myelopathies (Multi) 12/08/2021    Neurogenic claudication    Pain in unspecified ankle and joints of unspecified foot 08/29/2020    Ankle pain    Personal history of other drug therapy     History of anticoagulant therapy    Personal history of other endocrine, nutritional and metabolic disease 08/18/2022    History of hyperkalemia    Personal history of other infectious and parasitic diseases 01/29/2018    History of herpes labialis    Personal history of other specified conditions 09/04/2020    History of palpitations    Radiculopathy, lumbar region 12/08/2021    Lumbar radiculopathy    Radiculopathy, lumbar region 07/02/2021    Left lumbar radiculopathy    Tobacco abuse counseling 05/06/2022    Tobacco abuse counseling    " Transient cerebral ischemic attack, unspecified     TIA on medication    Unspecified mononeuropathy of unspecified lower limb 10/01/2021    Lower extremity neuropathy    Urinary tract infection, site not specified 08/31/2020    Acute UTI     Surgical History  Past Surgical History:   Procedure Laterality Date    CT AORTA AND BILATERAL ILIOFEMORAL RUNOFF ANGIOGRAM W AND/OR WO IV CONTRAST  3/8/2023    CT AORTA AND BILATERAL ILIOFEMORAL RUNOFF ANGIOGRAM W AND/OR WO IV CONTRAST STJ CT    OTHER SURGICAL HISTORY  11/22/2022    Loop recorder insertion     Social History  She reports that she quit smoking about 20 months ago. Her smoking use included cigarettes. She has been exposed to tobacco smoke. She has never used smokeless tobacco. She reports that she does not drink alcohol and does not use drugs.    Family History  Family History   Problem Relation Name Age of Onset    Hypertension Mother      Diabetes Mother      Stroke Father          Allergies  Allergies   Allergen Reactions    Gabapentin Confusion     Was a little confused     Sulfa (Sulfonamide Antibiotics) Hives    Sulfanilamide (Bulk) Hives    Sulfasalazine Hives     Review of Systems   All 13 systems were reviewed and are within normal levels except as noted below or per HPI. Positive and pertinent negative responses are noted below or in the HPI   Denied any fever or chills. No weight loss and no night sweats. No cough or sputum production. No diarrhea   No constipation  No bladder and bowel incontinence and no other changes in bladder and bowel. No skin changes.   Denied opioids diversion and abuse and denies alcoholism. Denies overuse of  pain medications.   Physical Exam       Past medical history no interval changes has been noted    On physical examination    General   Alert, oriented x3 pleasant and cooperative. Does not look in any major distress.    HEENT  Pupils normal in size. Ears, nose, mouth, and throat appear to be in normal condition.  Head  atraumatic      No signs of sedation or signs of withdrawal apparent.    Psychiatric   No signs of depression apparent.    Neuro   No focal neurological deficit apparent. Ambulation at baseline.  Palpation of the lumbar spine area created mild tenderness around the facet joints bilateral    Respiratory  No respiratory distress     Abdomen  no distention     Skin  No skin markings supportive of recent IV drug usage .    Cardiovascular  Regular rate and rhythm    Last Recorded Vitals  Blood pressure 127/64, pulse 64, temperature 36.4 °C (97.5 °F), resp. rate 18, SpO2 91%.  XR lumbar spine 2-3 views    Result Date: 5/23/2024  Interpreted By:  Esvin Pina, STUDY: XR LUMBAR SPINE 2-3 VIEWS   INDICATION: Signs/Symptoms:pain.   COMPARISON: August 28, 2020   ACCESSION NUMBER(S): [id number]   ORDERING CLINICIAN: RORY LEWIS   FINDINGS: Advanced lumbar degenerative changes at all levels.   Mild S shaped scoliosis unchanged.   No evidence of fracture or lesion.       Advanced lumbar degenerative changes with scoliosis similar to the previous exam.   Signed by: Esvin Pina 5/23/2024 7:20 AM Dictation workstation:   [id number]     Assessment/Plan   79 years old with history and physical examination supportive of advanced degenerative disc disease lumbago lumbar spondylosis and scoliosis tried and failed conservative management with physical therapy and nonsteroidal anti-inflammatory    Plan  Discussed with the patient the different modalities available for the treatment of her condition I recommended for her diagnostic medial nerve branch block to be performed bilaterally at the level of the L3-L4 L4-L5 under fluoroscopic guidance to confirm needle placement if the patient described positive response at that time could proceed with the denervation with a radiofrequency ablation of the medial nerve branches bilaterally L3-L4 L4-L5 to be performed under fluoroscopic guidance currently the patient is anticoagulated on the  Eliquis and she understand the risk it would be advisable to hold the Eliquis for few days to minimize the risk of any hematoma patient will be confirming with the prescriber of the Eliquis the safety of holding it and will be proceeding accordingly      The above clinical summary has been dictated with voice recognition software. It has not been proofread for grammatical errors, typographical mistakes, or other semantic inconsistencies.    Thank you for visiting our office today. It was our pleasure to take part in your healthcare.     Please do not hesitate to contact the pain clinic after your visit with any questions or concerns at  M-F 8-4 pm       Matthew Bowman M.D.  Medical Director , Division of Pain Medicine [facility]   of Anesthesiology and Pain Medicine  [facility]     [address] [address]     Office: [phone]  Fax: [phone]      Matthew Bowman MD

## 2024-09-11 ENCOUNTER — APPOINTMENT (OUTPATIENT)
Dept: PRIMARY CARE | Facility: CLINIC | Age: 79
End: 2024-09-11
Payer: MEDICARE

## 2024-10-08 ENCOUNTER — HOSPITAL ENCOUNTER (OUTPATIENT)
Dept: PAIN MEDICINE | Facility: CLINIC | Age: 79
Discharge: HOME | End: 2024-10-08
Payer: MEDICARE

## 2024-10-08 VITALS
RESPIRATION RATE: 18 BRPM | TEMPERATURE: 96.1 F | SYSTOLIC BLOOD PRESSURE: 136 MMHG | DIASTOLIC BLOOD PRESSURE: 79 MMHG | HEART RATE: 67 BPM | OXYGEN SATURATION: 92 %

## 2024-10-08 DIAGNOSIS — M47.816 SPONDYLOSIS WITHOUT MYELOPATHY OR RADICULOPATHY, LUMBAR REGION: ICD-10-CM

## 2024-10-08 PROCEDURE — 64494 INJ PARAVERT F JNT L/S 2 LEV: CPT | Performed by: PAIN MEDICINE

## 2024-10-08 PROCEDURE — 64494 INJ PARAVERT F JNT L/S 2 LEV: CPT | Mod: 50 | Performed by: PAIN MEDICINE

## 2024-10-08 PROCEDURE — 64493 INJ PARAVERT F JNT L/S 1 LEV: CPT | Mod: 50 | Performed by: PAIN MEDICINE

## 2024-10-08 PROCEDURE — 64493 INJ PARAVERT F JNT L/S 1 LEV: CPT | Performed by: PAIN MEDICINE

## 2024-10-08 PROCEDURE — 2500000004 HC RX 250 GENERAL PHARMACY W/ HCPCS (ALT 636 FOR OP/ED): Performed by: PAIN MEDICINE

## 2024-10-08 RX ORDER — LIDOCAINE HYDROCHLORIDE 10 MG/ML
INJECTION, SOLUTION EPIDURAL; INFILTRATION; INTRACAUDAL; PERINEURAL AS NEEDED
Status: DISCONTINUED | OUTPATIENT
Start: 2024-10-08 | End: 2024-10-09 | Stop reason: HOSPADM

## 2024-10-08 RX ORDER — LIDOCAINE HYDROCHLORIDE 20 MG/ML
INJECTION, SOLUTION EPIDURAL; INFILTRATION; INTRACAUDAL; PERINEURAL AS NEEDED
Status: DISCONTINUED | OUTPATIENT
Start: 2024-10-08 | End: 2024-10-09 | Stop reason: HOSPADM

## 2024-10-08 ASSESSMENT — ENCOUNTER SYMPTOMS: LOSS OF SENSATION IN FEET: 0

## 2024-10-08 ASSESSMENT — PATIENT HEALTH QUESTIONNAIRE - PHQ9
2. FEELING DOWN, DEPRESSED OR HOPELESS: NOT AT ALL
SUM OF ALL RESPONSES TO PHQ9 QUESTIONS 1 AND 2: 0
1. LITTLE INTEREST OR PLEASURE IN DOING THINGS: NOT AT ALL

## 2024-10-08 ASSESSMENT — PAIN DESCRIPTION - DESCRIPTORS: DESCRIPTORS: ACHING

## 2024-10-08 ASSESSMENT — PAIN SCALES - GENERAL: PAINLEVEL_OUTOF10: 2

## 2024-10-08 NOTE — DISCHARGE INSTRUCTIONS
Post-injection instructions FOR FACET BLOCK      Pay attention to how much pain relief (what percentage compared to before the procedure) you get and for how long it lasts.     THIS IS A TEMPORARY NUMBING OF PAIN THIS IS BEING USED AS A DIAGNOSTIC INDICATOR IF THIS IS THE SOURCE OF YOUR PAIN    Activity:  RETURN TO NORMAL ACTIVITY  SEE IF YOU CAN APPRECIATE AN IMPROVEMENT IN THE PAIN    Bandages: Remove after 24 hours     Showering/Bathing: You may shower after bandage is removed     Follow up: CALL OFFICE NEXT BUSINESS [phone] LEAVE MESSAGE ABOUT THE % OF RELIEF THAT WAS OBTAINED AND FOR HOW MANY HOURS      Call the OFFICE immediately: if you notice:     Excessive bleeding from procedure site (brisk bright red bleeding from the site or bleeding that soaks the bandages or does not stop)   Severe headache  Inability to walk, leg or arm weakness or numbness that is worse after the procedure   Uncontrolled pain   New urinary or fecal incontinence   Signs of infection: Fever above 101.5F, redness, swelling, pus or drainage from the site

## 2024-10-10 ENCOUNTER — TELEPHONE (OUTPATIENT)
Dept: PAIN MEDICINE | Facility: CLINIC | Age: 79
End: 2024-10-10
Payer: MEDICARE

## 2024-10-10 DIAGNOSIS — M47.812 CERVICAL SPONDYLOSIS WITHOUT MYELOPATHY: Primary | ICD-10-CM

## 2024-10-10 NOTE — TELEPHONE ENCOUNTER
Calls in to report at least 80% improvement from the pain following the procedure lasting 3 hours;  he would like to proceed with the next step please place order

## 2024-10-13 NOTE — PROGRESS NOTES
Subjective   Patient ID: Shannan Tee is a 79 y.o. female who presents for her 4 month follow up multiple medical conditions      She would like to have the influenza vaccine while she is in the office today     -s/p kyphoplasty with spine fermín T6- 7 on 7/24/2024 with Dr Bowman     She is not sure if the surgery helped. She did not really notice much difference   Her back bothers her most when she is standing. She can stand maybe 5 minutes at the most and then has to sit.  She develops weakness in her legs the longer she tries to  place   She has been looking up exercises she can do on the Internet   She can no longer play tennis.  She is able to do her gardening. She crawls around in the garden on her hands and knees pulling weeds    She does not think she is sleeping well.  She is a mouth breather and wakes with dry mucosa  She has no pain when sleeping and she only gets up once a night   She is fatigued     She quit smoking 5/12/2024 for 2 months but has restarted smoking   She gets terrible cramping in her feet when walking and she has to sit or she will fall.      HEALTH:  PAP no longer needs to repeat   Mammo 1/08, 2016, 5/19, 8/20, 8/21, no longer needs to repeat   BD 4/16 T-1.9, 5/19 T-2.1, 8/21 T-2.5, 8/23 T-3.1.   Colon 6/08 adenoma, 5/12 and declines repeat. Declines Cologuard   ECHO 9/17, 5/2021, 3/23   Carotids 9/17 <50%    EKG 6/19, 9/20, 5/21, 7/21, 5/22, 8/22, 4/23, 3/24, 5/24    Urine 2017, 1/19   Hep C 8/18 -  FLU 1/23, 10/24   TDAP 2013, 6/23 with injury   Arexvy recommended and will consider   Prevnar 5/17  Pneumo 1/2011  Shingrix recommend and declines   SARS CVD vaccine declines   Ophth Seen in 2/2024 for corneal abrasion left eye. She had cataract extraction in 2018. She is not wearing glasses except for night driving. No glaucoma or MD.  Copy of her Advanced Directives scanned in her chart 3/2023       Review of Systems  All systems negative except those listed in the HPI       Objective   Visit Vitals  /60 (BP Location: Left arm, Patient Position: Sitting, BP Cuff Size: Adult)   Pulse 76   Temp 36.3 °C (97.4 °F) (Temporal)   Resp 16    Body mass index is 18.78 kg/m².     Physical Exam  Vitals reviewed.   Constitutional:       Appearance: Normal appearance.      Comments: Low normal weight    HENT:      Head: Normocephalic.      Right Ear: Tympanic membrane, ear canal and external ear normal.      Left Ear: Tympanic membrane, ear canal and external ear normal.      Nose: Nose normal.      Mouth/Throat:      Pharynx: Oropharynx is clear.   Eyes:      Conjunctiva/sclera: Conjunctivae normal.   Cardiovascular:      Rate and Rhythm: Normal rate and regular rhythm.      Pulses: Normal pulses.      Heart sounds: Normal heart sounds.      Comments: she is not in a- fib  Pulmonary:      Effort: Pulmonary effort is normal.      Breath sounds: Normal breath sounds.   Abdominal:      General: Bowel sounds are normal.      Palpations: Abdomen is soft.   Musculoskeletal:         General: Normal range of motion.      Cervical back: Normal range of motion and neck supple.   Skin:     General: Skin is warm.   Neurological:      General: No focal deficit present.      Mental Status: She is alert and oriented to person, place, and time.   Psychiatric:         Mood and Affect: Mood normal.         Behavior: Behavior normal.         Thought Content: Thought content normal.         Judgment: Judgment normal.       Assessment/Plan        Follow up completed      She is  with 2 sons.   Her son (Oliver) passed on 6/27/2023 from cirrhosis at age 42.      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  She is a mouth breather and wakes with dry mucosa  She has no pain when sleeping and she only gets up once a night   She is fatigued   Recommend she  consider a sleep study for further evaluation      Seen in the ED on 3/21/2024 for pneumonia and T7 compression fracture :  She is not sure if the surgery helped. She did not really notice much difference. Her back bothers her most when she is standing. She can stand maybe 5 minutes at the most and then has to sit. She develops weakness in her legs the longer she tries to  place. She has been looking up exercises she can do on the Internet. Recommend she make slow mindful movements when exercising. She can no longer play tennis. She is able to do her gardening. She crawls around in the garden on her hands and knees pulling weeds. Recommend she get a seat for gardening. Explained she is shifting her lower back while she is crawling and she is adding pressure to her knees. Demonstrated exercises in exam room that she can do at home 10/24.    CT angio chest and CT abdomen pelvis 3/24 aneurysmal dilatation of the ascending thoracic aorta was noted as well as a descending thoracic aortic aneurysm and interstitial edema small bilateral effusions and patchy airspace density right upper lobe as well as mediastinal adenopathy and pulmonary nodule noted   Treated for pneumonia with Augmentin and doxycycline   Xray L/S 5/24 Advanced lumbar degenerative changes/scoliosis similar to previous   Continue Fosamax 70 mg weekly and calcitonin NS daily.   -s/p kyphoplasty with spine fermín T6- 7 on 7/24/2024 with Dr Bowman     She aw Dr Bowman in 9/24            She quit smoking 5/12/2024 for 2 months but has restarted smoking        Her son did laser treatment for smoking.        Nicotine dependence I spent more than 3 minutes counseling patient about the need        for smoking cessation and how I can support efforts when patient is ready to quit.        Discussed nicotine replacement therapy, Varenicline, Buproprion, hypnosis, support        groups and acupuncture as potential options. Patient currently has no signs or         symptoms of tobacco related disease.       (Dx code F17.2 or Z87.891)(Billing code 69946 or 31211)        Her hearing is not as good as it used to be   She declines appt with audiology for now 1/2023      Her weight/ BMI is in normal range in office, recommend she maintain  Her weight is 106 and BMI 18.78 IO 10/2024  She is not eating well but had a nice meal last night  She sometimes has emesis postprandial       HTN: BP normal.    Recommend when she stand she count to 10 prior to taking her first steps. Recommend increased sodium in her diet   Continue atenolol 25 mg daily and hydroxyzine 25 mg TID   ECHO 5/2021 was normal but poor functional capacity   Stress test normal 5/2021  EKG 5/24 a- fib with RVR   Recommend she monitor her BP at home and call with elevated readings.    She cancelled her appt with Dr Edward on 5/17/2024 and rescheduled for 11/7/2024     Acute respiratory failure and cardiac tamponade.   Admitted on 3/30/2023 and discharged on 4/4/2023  She presented on 3/30/2023 for elective outpatient ICD placement which was complicated by cardiac tamponade and acute hypoxic respiratory failure.    - S/p percutaneous pericardiocentesis on 3/30/2023.   Approximately 400 to 500 cc of blood removed from the pericardial sac.  She saw Dr Robles in 4/2023. Recommend seeing Dr Edward, Dr Cohen or Dr Perez    Recommend and orders placed for cardiology 3/2024- recommend Dr Edward    She cancelled her appt with Dr Edward on 5/17/2024 and rescheduled for 11/7/2024     Long standing atrial Fib with prior TIA in 9/2017: On exam: she is not in a- fib 10/24.   Continue carvedilol 6.25 mg BID and amiodarone 200 mg daily  Continue spironolactone 25 mg daily and atenolol 25 mg daily  Continue Eliquis 2.5 mg BID and hydroxyzine 25 mg BID  - EP did an implantable loop on 11/18/2022     - S/p DC shock and dual chamber ICD interrogation and reporgramming on 4/20/2023 with Dr Perez    EKG 5/24 a- fib with RVR    Carotid duplex:  <50% stenosis bilateral vertebra arteries patent with antegrade flow  She was on Pradaxa then Eliquis since 9/17 after she was admitted with TIA 9/17   She cancelled her appt with Dr Edward on 5/17/2024 and rescheduled for 11/7/2024      Middle cerebral aneurysm right side :  She needs CT angio and has to schedule since 2 mm only      We spent 15 minutes face to face discussing her cardiac risks.   We discussed the patients cardiovascular risk. If needed, lifestyle modifications recommended including: behavioral therapies of nutrition choices, exercise and eliminate habits that are contributing to their cardiac risk. We agreed to a plan to decrease his cardiovascular risks. Discussed ASA. Reviewed Guidelines and approved recommendations made to patient.   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   Dr Pina did EBUS on 12/19/2022 showed Interval increase in size of right middle lobe mass when compared with the previous CT from 08/23/2021, concerning for neoplastic etiology. Interval increase in size of multiple mediastinal lymph nodes as detailed above, concerning for metastatic lymphadenopathy. Moderate upper lobe predominant emphysematous changes.   Pathology results 12/2022 showed malignant cells derived from SCC   -treated with SBRT (5 treatments) from 1/30 to 2/3/2022  -5/8/2023 PET showed interval decrease in size of RML mass with mild  residual activity  CT 5/14/2024 reviewed-mild to moderate emphysematous changes in the lungs with upper lobe predominance; previous bilateral pleural effusions have resolved; previous interstitial edema has resolved; areas of infiltrative density in anteromedial mid to lower right upper lobe have resolved; there is persistent pleural and parenchymal density with mild bronchiectasis in anteromedial mid to lower right middle lobe unchanged; mild and grossly stable central mediastinal adenopathy with lymph nodes measuring 10 mm or less;   [facility] for Rt lung cancer. RTC in 6 months    She Dr Gilbert in 5/2024     CKD: Cr 1.6 on labs in 3/2024.   Recommend she avoid all NSAIDs   We will continue to monitor      GERD and emesis:  Recommend she consider adding omeprazole 20 mg daily      LE edema:  Continue furosemide 40 mg daily   Monitor daily sodium intake  Elevate legs for 45 minutes in the afternoons   Elevate legs as much as possible during the day      Situational depression:    Her son (Oliver) passed on 6/27/2023 from cirrhosis at age 42.       She admits she has depression about her sons passing.    She is happy when she gets to see her grandchildren  She declines medications for treatment      Insomnia:  She stopped trazodone.     Constipation:   Explained systemic medications that are contributing to her constipation   She is to avoid straining with bowel movement   Continue Mg 200 mg daily.  She can add a stool softener when needed      Neurogenic claudication BLE: she gets terrible cramping in her feet when walking and she has to sit or she will fall. Warned patient smoking is making her vascular issues worse. Recommend she stop smoking. She quit smoking in 5/24 for 2 months but restarted   Aortogram 7/2021 with lower extremity angiography showed critical proximal right iliac, renal artery stenosis of about 90% or more with heavy calcifications, completely occluded left SFA throughout its  length, and a focal mid right SFA stenosis of about 90% had iliac artery stenting via the right femoral approach without complication   MRI of L/S 11/2021 did not show stenosis   EMG studies 11/2021 were normal   She failed gabapentin   Continue cilostazol 50 mg daily  She restarted PT but on the 3 rd session she pulled a hamstring     She saw Dr Mota in 11/2021 and feels she has PAD versus peripheral neuropathy     She saw Dr Winn in 2/2022 and recommend continued medical management   Recommend and orders placed for Dr Zack Das for evaluation 3/2024   She has an appt with Dr Zack Haider on 11/5/24     Spinal Stenosis: her back pain is chronic   She should wait on any spine surgery until pain is consistent and intolerable.   Xray L/S 8/2020 showed severe multilevel spondylosis worse at L4- 5 and mild anterolisthesis of L3 on L4 and retrolisthesis of L1 on L2   CT 5/2024 prominent compression fracture at T7 progressed since 3/21/2024 with mild paraspinal soft tissue swelling; there is also subtle interval erosion of anterior inferior aspect of the T6 vertebral body    She will continue to follow with Dr Almanzar and Dr Porras   She has done PT and chiropractor in the past and declines to do more   Recommend she look into a girdle to help with posture and back pain   She is scheduled for kyphoplasty with spine fermín T6- 7 on 7/24/2024 with Dr Bowman    She saw Dr Bowman in 9/24      Vitamin D def: levels 15 on labs in 3/2024  Discontinue scripted Vitamin D due to concerns for bone loss with higher doses  Recommend she begin OTC Vitamin D3 2000 UT daily      Mammo in 8/2021 was normal and no longer needs to repeat   She is getting routine CT scans of the chest with oncology  Breast exam normal 3/24     BD in 8/2023 T-3.1. Explained she is osteoporosis stage.   Continue Fosamax 70 mg weekly and calcitonin NS to use daily.     Continue OTC Ca 600 mg BID, OTC Vitamin D3 2000 UT daily and eat 2  servings of calcium enriched foods daily.   Discussed importance of weight bearing exercises.       Colonoscopy with Dr Jain in  was normal.  She knows a couple of people who  during colonoscopy and she is afraid to repeat it. She declines Cologuard because her sister had false positive results.  She declines colonoscopy and Cologuard 2021     Ophth:    Seen in 2024 for corneal abrasion left eye. She had cataract extraction in 2018. She is not wearing glasses except for night driving. No glaucoma or MD.     She has a LW and MPOA.  Copy of her Advanced Directives scanned in her chart 3/2023       Hep C  -  FLU , 10/24   TDAP ,  with injury   Arexvy recommended and will consider   Prevnar   Pneumo 2011  Shingrix recommend and declines   SARS CVD vaccine declines   Disability placard given 2023 for 5 years      Some elements in the chart were copied from Dr. Warren's last office visit with patient.   Notes have been updated where appropriate, and reflect my current medical decision making from today.       RTC in 3/25 for MCR or sooner if needed   (MCR due 3/2025)     Scribe Attestation  By signing my name below, I, Jessica Blackman , Scribe   attest that this documentation has been prepared under the direction and in the presence of Sunshine Warren, MDPatient was identified as a fall risk. Risk prevention instructions provided

## 2024-10-14 ENCOUNTER — APPOINTMENT (OUTPATIENT)
Dept: PRIMARY CARE | Facility: CLINIC | Age: 79
End: 2024-10-14
Payer: MEDICARE

## 2024-10-14 VITALS
HEART RATE: 76 BPM | WEIGHT: 106 LBS | SYSTOLIC BLOOD PRESSURE: 110 MMHG | BODY MASS INDEX: 18.78 KG/M2 | DIASTOLIC BLOOD PRESSURE: 60 MMHG | TEMPERATURE: 97.4 F | OXYGEN SATURATION: 97 % | HEIGHT: 63 IN | RESPIRATION RATE: 16 BRPM

## 2024-10-14 DIAGNOSIS — Z95.810 ICD (IMPLANTABLE CARDIOVERTER-DEFIBRILLATOR) IN PLACE: ICD-10-CM

## 2024-10-14 DIAGNOSIS — I42.0 DILATED CARDIOMYOPATHY (MULTI): ICD-10-CM

## 2024-10-14 DIAGNOSIS — R06.02 EXERTIONAL SHORTNESS OF BREATH: ICD-10-CM

## 2024-10-14 DIAGNOSIS — E78.5 DYSLIPIDEMIA: ICD-10-CM

## 2024-10-14 DIAGNOSIS — I10 BENIGN ESSENTIAL HYPERTENSION: ICD-10-CM

## 2024-10-14 DIAGNOSIS — G45.9 TRANSIENT ISCHEMIC ATTACK: ICD-10-CM

## 2024-10-14 DIAGNOSIS — E46 PROTEIN-CALORIE MALNUTRITION, UNSPECIFIED SEVERITY (MULTI): ICD-10-CM

## 2024-10-14 DIAGNOSIS — I48.0 PAROXYSMAL A-FIB (MULTI): Primary | ICD-10-CM

## 2024-10-14 DIAGNOSIS — S22.060S COMPRESSION FRACTURE OF T7 VERTEBRA, SEQUELA: ICD-10-CM

## 2024-10-14 DIAGNOSIS — I67.1 ANEURYSM OF MIDDLE CEREBRAL ARTERY (HHS-HCC): ICD-10-CM

## 2024-10-14 DIAGNOSIS — J43.9 PULMONARY EMPHYSEMA, UNSPECIFIED EMPHYSEMA TYPE (MULTI): ICD-10-CM

## 2024-10-14 DIAGNOSIS — C34.31 MALIGNANT NEOPLASM OF LOWER LOBE OF RIGHT LUNG (MULTI): ICD-10-CM

## 2024-10-14 DIAGNOSIS — I50.22 CHRONIC SYSTOLIC CONGESTIVE HEART FAILURE: ICD-10-CM

## 2024-10-14 DIAGNOSIS — G95.19: ICD-10-CM

## 2024-10-14 DIAGNOSIS — N18.31 STAGE 3A CHRONIC KIDNEY DISEASE (MULTI): ICD-10-CM

## 2024-10-14 DIAGNOSIS — I73.9 PAD (PERIPHERAL ARTERY DISEASE) (CMS-HCC): ICD-10-CM

## 2024-10-14 PROBLEM — R09.02 HYPOXIA: Status: RESOLVED | Noted: 2023-04-08 | Resolved: 2024-10-14

## 2024-10-14 PROCEDURE — 1123F ACP DISCUSS/DSCN MKR DOCD: CPT | Performed by: INTERNAL MEDICINE

## 2024-10-14 PROCEDURE — 1036F TOBACCO NON-USER: CPT | Performed by: INTERNAL MEDICINE

## 2024-10-14 PROCEDURE — 1160F RVW MEDS BY RX/DR IN RCRD: CPT | Performed by: INTERNAL MEDICINE

## 2024-10-14 PROCEDURE — 3078F DIAST BP <80 MM HG: CPT | Performed by: INTERNAL MEDICINE

## 2024-10-14 PROCEDURE — 3074F SYST BP LT 130 MM HG: CPT | Performed by: INTERNAL MEDICINE

## 2024-10-14 PROCEDURE — 1159F MED LIST DOCD IN RCRD: CPT | Performed by: INTERNAL MEDICINE

## 2024-10-14 PROCEDURE — 1157F ADVNC CARE PLAN IN RCRD: CPT | Performed by: INTERNAL MEDICINE

## 2024-10-14 PROCEDURE — G0008 ADMIN INFLUENZA VIRUS VAC: HCPCS | Performed by: INTERNAL MEDICINE

## 2024-10-14 PROCEDURE — 99215 OFFICE O/P EST HI 40 MIN: CPT | Performed by: INTERNAL MEDICINE

## 2024-10-14 PROCEDURE — 90662 IIV NO PRSV INCREASED AG IM: CPT | Performed by: INTERNAL MEDICINE

## 2024-10-14 PROCEDURE — 99406 BEHAV CHNG SMOKING 3-10 MIN: CPT | Performed by: INTERNAL MEDICINE

## 2024-10-21 DIAGNOSIS — I48.0 PAROXYSMAL ATRIAL FIBRILLATION (MULTI): ICD-10-CM

## 2024-10-21 RX ORDER — AMIODARONE HYDROCHLORIDE 200 MG/1
TABLET ORAL
Qty: 90 TABLET | Refills: 0 | Status: SHIPPED | OUTPATIENT
Start: 2024-10-21

## 2024-11-04 DIAGNOSIS — I48.91 ATRIAL FIBRILLATION WITH RVR (MULTI): ICD-10-CM

## 2024-11-04 RX ORDER — APIXABAN 5 MG/1
2.5 TABLET, FILM COATED ORAL 2 TIMES DAILY
Qty: 30 TABLET | Refills: 0 | Status: SHIPPED | OUTPATIENT
Start: 2024-11-04 | End: 2024-11-05 | Stop reason: ALTCHOICE

## 2024-11-05 ENCOUNTER — APPOINTMENT (OUTPATIENT)
Dept: CARDIOLOGY | Facility: CLINIC | Age: 79
End: 2024-11-05
Payer: MEDICARE

## 2024-11-05 VITALS
DIASTOLIC BLOOD PRESSURE: 68 MMHG | WEIGHT: 109 LBS | HEART RATE: 74 BPM | SYSTOLIC BLOOD PRESSURE: 102 MMHG | OXYGEN SATURATION: 94 % | BODY MASS INDEX: 19.31 KG/M2 | HEIGHT: 63 IN

## 2024-11-05 DIAGNOSIS — I10 BENIGN ESSENTIAL HYPERTENSION: ICD-10-CM

## 2024-11-05 DIAGNOSIS — I50.22 CHRONIC SYSTOLIC CONGESTIVE HEART FAILURE: ICD-10-CM

## 2024-11-05 DIAGNOSIS — I48.0 PAROXYSMAL A-FIB (MULTI): ICD-10-CM

## 2024-11-05 DIAGNOSIS — E78.5 DYSLIPIDEMIA: ICD-10-CM

## 2024-11-05 DIAGNOSIS — I73.9 PAD (PERIPHERAL ARTERY DISEASE) (CMS-HCC): Primary | ICD-10-CM

## 2024-11-05 PROCEDURE — 4004F PT TOBACCO SCREEN RCVD TLK: CPT | Performed by: STUDENT IN AN ORGANIZED HEALTH CARE EDUCATION/TRAINING PROGRAM

## 2024-11-05 PROCEDURE — 99214 OFFICE O/P EST MOD 30 MIN: CPT | Performed by: STUDENT IN AN ORGANIZED HEALTH CARE EDUCATION/TRAINING PROGRAM

## 2024-11-05 PROCEDURE — 99417 PROLNG OP E/M EACH 15 MIN: CPT | Performed by: STUDENT IN AN ORGANIZED HEALTH CARE EDUCATION/TRAINING PROGRAM

## 2024-11-05 PROCEDURE — 3078F DIAST BP <80 MM HG: CPT | Performed by: STUDENT IN AN ORGANIZED HEALTH CARE EDUCATION/TRAINING PROGRAM

## 2024-11-05 PROCEDURE — 1123F ACP DISCUSS/DSCN MKR DOCD: CPT | Performed by: STUDENT IN AN ORGANIZED HEALTH CARE EDUCATION/TRAINING PROGRAM

## 2024-11-05 PROCEDURE — 1159F MED LIST DOCD IN RCRD: CPT | Performed by: STUDENT IN AN ORGANIZED HEALTH CARE EDUCATION/TRAINING PROGRAM

## 2024-11-05 PROCEDURE — 3074F SYST BP LT 130 MM HG: CPT | Performed by: STUDENT IN AN ORGANIZED HEALTH CARE EDUCATION/TRAINING PROGRAM

## 2024-11-05 PROCEDURE — 1157F ADVNC CARE PLAN IN RCRD: CPT | Performed by: STUDENT IN AN ORGANIZED HEALTH CARE EDUCATION/TRAINING PROGRAM

## 2024-11-05 RX ORDER — HYDROCHLOROTHIAZIDE 25 MG/1
TABLET ORAL
COMMUNITY
End: 2024-11-05 | Stop reason: ALTCHOICE

## 2024-11-05 NOTE — PROGRESS NOTES
Chief complaint:   Chief Complaint   Patient presents with    Follow-up        History of Present Illness  Edda Tee is a 79 y.o. year old female with history of lung cancer, atrial fibrillation on amiodarone and Eliquis, nonischemic cardiomyopathy HFrEF LVEF 25%, status post ICD, moderate coronary artery disease, COPD, PAD status post prior iliac and SFA stents who is presenting for cardiovascular follow-up    Patient reports to be overall doing well.  She denies chest pain, no shortness of breath, no palpitations, or syncope. Does have some mild dizziness  Today reports cramps in feet when standing or when walking.    Social History     Tobacco Use    Smoking status: Some Days     Current packs/day: 0.00     Types: Cigarettes     Last attempt to quit:      Years since quittin.8     Passive exposure: Past    Smokeless tobacco: Never   Vaping Use    Vaping status: Never Used   Substance Use Topics    Alcohol use: Never    Drug use: Never       Outpatient Medications:  Current Outpatient Medications   Medication Instructions    alendronate (FOSAMAX) 70 mg, oral, Every 7 days, Take in the morning with a full glass of water, on an empty stomach, and do not take anything else by mouth or lie down for the next 30 min.    amiodarone (Pacerone) 200 mg tablet TAKE ONE TABLET BY MOUTH EVERY DAY    apixaban (ELIQUIS) 2.5 mg, oral, 2 times daily    calcitonin, salmon, (Miacalcin) 200 unit/actuation nasal spray 1 spray, One Nostril, Daily    carvedilol (COREG) 3.125 mg, oral, 2 times daily (morning and late afternoon)    ergocalciferol (VITAMIN D-2) 50,000 Units, oral, Once Weekly    hydrOXYzine HCL (ATARAX) 25 mg, oral, 2 times daily    lidocaine (Lidoderm) 5 % patch 1 patch, transdermal, Daily, Apply to painful area 12 hours per day, remove for 12 hours.    MAGNESIUM GLUCONATE ORAL 1 tablet, Daily    spironolactone (ALDACTONE) 12.5 mg, oral, Daily    vit C/E/Zn/coppr/lutein/zeaxan (PRESERVISION AREDS-2 ORAL)  1 capsule, Daily    vitamin E 400 Units, Daily         Vitals:  Vitals:    11/05/24 1406   BP: 102/68   Pulse: 74   SpO2: 94%       Physical Exam:  General: NAD, well-appearing, elderly  HEENT: moist mucous membranes, no jaundice  Neck: No JVD, no carotid bruit  Lungs: CTA kathryn, no wheezing or rales  Cardiac: RRR, no murmurs  Abdomen: soft, non-tender, non-distended  Extremities: 2+ radial pulses, no edema, no wounds, nonpalpable pedal pulses  Skin: warm, dry  Neurologic: AAOx3,  no focal deficits      Assessment/Plan       # Nonischemic cardiomyopathy  -Previously assessed as LVEF 20 to 25%, likely related to tachycardia induced CM  -Most recent echo with recovered LVEF to 60-65%  -She is currently euvolemic on exam.  -Continue GDMT with carvedilol 3.125 mg twice daily and Aldactone    # Coronary artery disease  -Prior left heart cath with moderate CAD in the RCA, to be managed medically  -Patient is asymptomatic.  Patient is currently on Eliquis.  Will add statin therapy    # Atrial fibrillation  -On reduced dose Eliquis 2.5 mg twice daily  -She is scheduled to follow-up with EP    # Peripheral artery disease  -With history of prior lower extremity interventions.  She reports pain in her feet, but no significant thigh or calf claudication symptoms  -Prior MACKENZIE/TBI reviewed with evidence of  disease bilaterally.  Will continue to manage medically in the absence of foot wounds or rest pain.  Discussed with patient importance of regular footcare and follow-up with podiatry      Lety Dixon MD [facility]  Interventional Cardiology  Endovascular Interventions  [email or web address]    **Disclaimer: This note was dictated by speech recognition, and every effort has been made to prevent any error in transcription, however minor errors may be present**

## 2024-11-07 ENCOUNTER — APPOINTMENT (OUTPATIENT)
Dept: CARDIOLOGY | Facility: CLINIC | Age: 79
End: 2024-11-07
Payer: MEDICARE

## 2024-11-07 ENCOUNTER — LAB (OUTPATIENT)
Dept: LAB | Facility: LAB | Age: 79
End: 2024-11-07
Payer: MEDICARE

## 2024-11-07 VITALS
HEART RATE: 75 BPM | OXYGEN SATURATION: 95 % | BODY MASS INDEX: 19.31 KG/M2 | SYSTOLIC BLOOD PRESSURE: 132 MMHG | WEIGHT: 109 LBS | HEIGHT: 63 IN | DIASTOLIC BLOOD PRESSURE: 72 MMHG

## 2024-11-07 DIAGNOSIS — Z79.899 LONG TERM CURRENT USE OF AMIODARONE: ICD-10-CM

## 2024-11-07 DIAGNOSIS — C34.2 MALIGNANT NEOPLASM OF MIDDLE LOBE OF RIGHT LUNG (MULTI): ICD-10-CM

## 2024-11-07 DIAGNOSIS — I48.0 PAROXYSMAL A-FIB (MULTI): Primary | ICD-10-CM

## 2024-11-07 DIAGNOSIS — E87.5 HYPERKALEMIA: Primary | ICD-10-CM

## 2024-11-07 DIAGNOSIS — I48.0 PAROXYSMAL ATRIAL FIBRILLATION (MULTI): ICD-10-CM

## 2024-11-07 DIAGNOSIS — I48.91 ATRIAL FIBRILLATION WITH RVR (MULTI): ICD-10-CM

## 2024-11-07 DIAGNOSIS — I48.0 PAROXYSMAL A-FIB (MULTI): ICD-10-CM

## 2024-11-07 LAB
ALBUMIN SERPL BCP-MCNC: 3.9 G/DL (ref 3.4–5)
ALP SERPL-CCNC: 151 U/L (ref 33–136)
ALT SERPL W P-5'-P-CCNC: 20 U/L (ref 7–45)
ANION GAP SERPL CALC-SCNC: 10 MMOL/L (ref 10–20)
AST SERPL W P-5'-P-CCNC: 21 U/L (ref 9–39)
BASOPHILS # BLD AUTO: 0.07 X10*3/UL (ref 0–0.1)
BASOPHILS NFR BLD AUTO: 0.9 %
BILIRUB SERPL-MCNC: 0.4 MG/DL (ref 0–1.2)
BUN SERPL-MCNC: 33 MG/DL (ref 6–23)
CALCIUM SERPL-MCNC: 9.1 MG/DL (ref 8.6–10.3)
CHLORIDE SERPL-SCNC: 107 MMOL/L (ref 98–107)
CO2 SERPL-SCNC: 30 MMOL/L (ref 21–32)
CREAT SERPL-MCNC: 1.96 MG/DL (ref 0.5–1.05)
EGFRCR SERPLBLD CKD-EPI 2021: 26 ML/MIN/1.73M*2
EOSINOPHIL # BLD AUTO: 0.21 X10*3/UL (ref 0–0.4)
EOSINOPHIL NFR BLD AUTO: 2.8 %
ERYTHROCYTE [DISTWIDTH] IN BLOOD BY AUTOMATED COUNT: 14.9 % (ref 11.5–14.5)
GLUCOSE SERPL-MCNC: 84 MG/DL (ref 74–99)
HCT VFR BLD AUTO: 41.8 % (ref 36–46)
HGB BLD-MCNC: 12.9 G/DL (ref 12–16)
IMM GRANULOCYTES # BLD AUTO: 0.02 X10*3/UL (ref 0–0.5)
IMM GRANULOCYTES NFR BLD AUTO: 0.3 % (ref 0–0.9)
LYMPHOCYTES # BLD AUTO: 2.13 X10*3/UL (ref 0.8–3)
LYMPHOCYTES NFR BLD AUTO: 28 %
MCH RBC QN AUTO: 30.9 PG (ref 26–34)
MCHC RBC AUTO-ENTMCNC: 30.9 G/DL (ref 32–36)
MCV RBC AUTO: 100 FL (ref 80–100)
MONOCYTES # BLD AUTO: 0.95 X10*3/UL (ref 0.05–0.8)
MONOCYTES NFR BLD AUTO: 12.5 %
NEUTROPHILS # BLD AUTO: 4.23 X10*3/UL (ref 1.6–5.5)
NEUTROPHILS NFR BLD AUTO: 55.5 %
NRBC BLD-RTO: 0 /100 WBCS (ref 0–0)
PLATELET # BLD AUTO: 159 X10*3/UL (ref 150–450)
POTASSIUM SERPL-SCNC: 6.2 MMOL/L (ref 3.5–5.3)
PROT SERPL-MCNC: 7.4 G/DL (ref 6.4–8.2)
RBC # BLD AUTO: 4.17 X10*6/UL (ref 4–5.2)
SODIUM SERPL-SCNC: 141 MMOL/L (ref 136–145)
TSH SERPL-ACNC: 1.65 MIU/L (ref 0.44–3.98)
WBC # BLD AUTO: 7.6 X10*3/UL (ref 4.4–11.3)

## 2024-11-07 PROCEDURE — 84443 ASSAY THYROID STIM HORMONE: CPT

## 2024-11-07 PROCEDURE — 4004F PT TOBACCO SCREEN RCVD TLK: CPT | Performed by: STUDENT IN AN ORGANIZED HEALTH CARE EDUCATION/TRAINING PROGRAM

## 2024-11-07 PROCEDURE — 1159F MED LIST DOCD IN RCRD: CPT | Performed by: STUDENT IN AN ORGANIZED HEALTH CARE EDUCATION/TRAINING PROGRAM

## 2024-11-07 PROCEDURE — 1157F ADVNC CARE PLAN IN RCRD: CPT | Performed by: STUDENT IN AN ORGANIZED HEALTH CARE EDUCATION/TRAINING PROGRAM

## 2024-11-07 PROCEDURE — 99214 OFFICE O/P EST MOD 30 MIN: CPT | Performed by: STUDENT IN AN ORGANIZED HEALTH CARE EDUCATION/TRAINING PROGRAM

## 2024-11-07 PROCEDURE — 82378 CARCINOEMBRYONIC ANTIGEN: CPT

## 2024-11-07 PROCEDURE — 80053 COMPREHEN METABOLIC PANEL: CPT

## 2024-11-07 PROCEDURE — 3078F DIAST BP <80 MM HG: CPT | Performed by: STUDENT IN AN ORGANIZED HEALTH CARE EDUCATION/TRAINING PROGRAM

## 2024-11-07 PROCEDURE — 1123F ACP DISCUSS/DSCN MKR DOCD: CPT | Performed by: STUDENT IN AN ORGANIZED HEALTH CARE EDUCATION/TRAINING PROGRAM

## 2024-11-07 PROCEDURE — 85025 COMPLETE CBC W/AUTO DIFF WBC: CPT

## 2024-11-07 PROCEDURE — 93000 ELECTROCARDIOGRAM COMPLETE: CPT | Performed by: STUDENT IN AN ORGANIZED HEALTH CARE EDUCATION/TRAINING PROGRAM

## 2024-11-07 PROCEDURE — 3075F SYST BP GE 130 - 139MM HG: CPT | Performed by: STUDENT IN AN ORGANIZED HEALTH CARE EDUCATION/TRAINING PROGRAM

## 2024-11-07 PROCEDURE — 36415 COLL VENOUS BLD VENIPUNCTURE: CPT

## 2024-11-07 RX ORDER — AMIODARONE HYDROCHLORIDE 200 MG/1
TABLET ORAL
Qty: 90 TABLET | Refills: 0 | Status: SHIPPED | OUTPATIENT
Start: 2024-11-07

## 2024-11-07 NOTE — PROGRESS NOTES
"    Cardiac Electrophysiology Office Visit     Referred by Dr. Villalobos ref. provider found for   No chief complaint on file.    HPI:  Edda Tee is a 79 y.o. year old female patient with h/o (R) Squamous lung CA s/p Radx, pAF, HTN, HFrEF s/p ICD (MDT 2023), TIA, CAD (no h/o PCI) presenting today for follow up    Objective  Current Outpatient Medications   Medication Instructions    alendronate (FOSAMAX) 70 mg, oral, Every 7 days, Take in the morning with a full glass of water, on an empty stomach, and do not take anything else by mouth or lie down for the next 30 min.    amiodarone (Pacerone) 200 mg tablet TAKE ONE TABLET BY MOUTH EVERY DAY    apixaban (ELIQUIS) 2.5 mg, oral, 2 times daily    calcitonin, salmon, (Miacalcin) 200 unit/actuation nasal spray 1 spray, One Nostril, Daily    carvedilol (COREG) 3.125 mg, oral, 2 times daily (morning and late afternoon)    ergocalciferol (VITAMIN D-2) 50,000 Units, oral, Once Weekly    hydrOXYzine HCL (ATARAX) 25 mg, oral, 2 times daily    lidocaine (Lidoderm) 5 % patch 1 patch, transdermal, Daily, Apply to painful area 12 hours per day, remove for 12 hours.    MAGNESIUM GLUCONATE ORAL 1 tablet, Daily    spironolactone (ALDACTONE) 12.5 mg, oral, Daily    vit C/E/Zn/coppr/lutein/zeaxan (PRESERVISION AREDS-2 ORAL) 1 capsule, Daily    vitamin E 400 Units, Daily         Visit Vitals  /72 (BP Location: Right arm, Patient Position: Sitting)   Pulse 75   Ht 1.6 m (5' 3\")   Wt 49.4 kg (109 lb)   SpO2 95%   BMI 19.31 kg/m²   Smoking Status Some Days   BSA 1.48 m²      Physical Exam  Constitutional:       Appearance: Normal appearance.   HENT:      Head: Normocephalic.   Eyes:      Pupils: Pupils are equal, round, and reactive to light.   Cardiovascular:      Rate and Rhythm: Normal rate and regular rhythm.      Pulses: Normal pulses.      Comments: left sided implant healed well, no ecchymosis, hematoma or drainage noted  Pulmonary:      Effort: Pulmonary effort is normal. No " respiratory distress.      Breath sounds: Normal breath sounds.   Skin:     General: Skin is warm and dry.      Capillary Refill: Capillary refill takes less than 2 seconds.   Neurological:      Mental Status: She is alert.         My Interpretation of Reviewed Study(s):  Transesophageal echo (April 2023): Mildly reduced LV function with an EF of 45% mildly noted left atrium no PFO.  Mild MR.  No pericardial effusion  [id number] Score  Age >= 75: 2   Sex Female: 1   CHF History Yes: 1   HTN Yes: 1   Stroke/TIA/Thromboembolism Yes: 2   Vascular Dz: CAD/PAD/Aortic Plaque Yes: 1   DM No: 0   Total Score 8     Assessment/Plan   #Paroxysmal atrial fibrillation  #s/p ILR (2023)  AF Dx History: 2022; h/o Cardioversion: Yes; AAD Use: Amiodarone 200mg (current); Anticoagulation use: Apixaban 2.5mg BID (current); h/o Ablation: None; [id number] Score: 8  Patient in the past and had RVR and has been on amiodarone with rhythm control  c/w AC: Apixaban 2.5mg BID (wt and sCr criteria)  Amiodarone 200mg daily    #Amiodarone - High risk medication  Labs:  Recent Labs     05/24/24  0917 03/26/24  1502 03/21/24  1132 03/11/24  1613 11/16/23  0935 09/05/23  1150 03/13/23  1040 02/26/23  0400 11/14/22  0543 11/13/22  1430 08/18/22  1735 08/18/22  1454   AST 19 21 33  --  22 26   < >  --    < > 34   < > 36   ALT 17 36 33  --  25 20   < >  --    < > 49*   < > 48*   TSH  --   --   --  2.09  --  1.51  --  2.98  --  2.63  --  2.09    < > = values in this interval not displayed.   Follow up:  TSH (Every 6 months): Dated 03/21/24; Within normal limits / No change compared to prior  LFTs (Every 6 months): Dated 05/24/24; Within normal limits / No change compared to prior  ECG (Annual): ECG Dated 11/7/24; QT/Qtc Stable  Continue with Amiodarone 200mg once a day       #HFrEF s/p ICD ([employer] March 2023)  Medtronic dual-chamber ICD implanted due to history of cardiomyopathy EF as low as 20% in the last 2 years.  Patient currently on  goal-directed medical therapy to maximally tolerated doses.  Patient has a [employer]/RevolightsM Dual chamber ICD.  Anticipated battery longevity 5.9-6.2yrs (as of 8/14/24).    RA pacing 96%, RV pacing <1%.   Arrhythmias noted on interrogation: None  Lead parameters stable with steady impedance and thresholds noted: Stable  c/t follow with device clinic as scheduled    Return to Clinic: Patient should return to the EP Clinic in 6 months    Keenan Edward MD [facility]  Cardiac Electrophysiology  [email or web address]    **Disclaimer: This note was dictated by speech recognition, and every effort has been made to prevent any error in transcription, however minor errors may be present**

## 2024-11-07 NOTE — PROGRESS NOTES
Called the phone to discuss the results with her.  Patient did not  left a voice message.  Ask her to discontinue her spironolactone and repeat BMP tomorrow  to make sure hyperkalemia is not persistent.

## 2024-11-08 LAB — CEA SERPL-MCNC: 5.2 UG/L

## 2024-11-11 ENCOUNTER — TELEPHONE (OUTPATIENT)
Dept: CARDIOLOGY | Facility: HOSPITAL | Age: 79
End: 2024-11-11

## 2024-11-11 ENCOUNTER — TELEPHONE (OUTPATIENT)
Dept: PRIMARY CARE | Facility: CLINIC | Age: 79
End: 2024-11-11
Payer: MEDICARE

## 2024-11-11 ENCOUNTER — LAB (OUTPATIENT)
Dept: LAB | Facility: LAB | Age: 79
End: 2024-11-11
Payer: MEDICARE

## 2024-11-11 DIAGNOSIS — E87.5 HYPERKALEMIA: ICD-10-CM

## 2024-11-11 LAB
ANION GAP SERPL CALC-SCNC: 12 MMOL/L (ref 10–20)
BUN SERPL-MCNC: 42 MG/DL (ref 6–23)
CALCIUM SERPL-MCNC: 9 MG/DL (ref 8.6–10.3)
CHLORIDE SERPL-SCNC: 105 MMOL/L (ref 98–107)
CO2 SERPL-SCNC: 28 MMOL/L (ref 21–32)
CREAT SERPL-MCNC: 1.91 MG/DL (ref 0.5–1.05)
EGFRCR SERPLBLD CKD-EPI 2021: 27 ML/MIN/1.73M*2
GLUCOSE SERPL-MCNC: 84 MG/DL (ref 74–99)
POTASSIUM SERPL-SCNC: 6.1 MMOL/L (ref 3.5–5.3)
SODIUM SERPL-SCNC: 139 MMOL/L (ref 136–145)

## 2024-11-11 PROCEDURE — 80048 BASIC METABOLIC PNL TOTAL CA: CPT

## 2024-11-11 PROCEDURE — 36415 COLL VENOUS BLD VENIPUNCTURE: CPT

## 2024-11-11 NOTE — TELEPHONE ENCOUNTER
Pt called- She was told to stop taking lactone by Deisi this morning   She wants to check if the spironolactone is what Deisi was referring to as the medication to stop taking

## 2024-11-12 ENCOUNTER — APPOINTMENT (OUTPATIENT)
Dept: CARDIOLOGY | Facility: HOSPITAL | Age: 79
End: 2024-11-12
Payer: MEDICARE

## 2024-11-12 ENCOUNTER — TELEPHONE (OUTPATIENT)
Dept: PRIMARY CARE | Facility: CLINIC | Age: 79
End: 2024-11-12
Payer: MEDICARE

## 2024-11-12 ENCOUNTER — HOSPITAL ENCOUNTER (EMERGENCY)
Facility: HOSPITAL | Age: 79
Discharge: HOME | End: 2024-11-12
Attending: EMERGENCY MEDICINE
Payer: MEDICARE

## 2024-11-12 VITALS
HEART RATE: 68 BPM | OXYGEN SATURATION: 93 % | RESPIRATION RATE: 25 BRPM | HEIGHT: 63 IN | SYSTOLIC BLOOD PRESSURE: 109 MMHG | DIASTOLIC BLOOD PRESSURE: 62 MMHG | BODY MASS INDEX: 19.31 KG/M2 | WEIGHT: 109 LBS | TEMPERATURE: 98.2 F

## 2024-11-12 DIAGNOSIS — R89.9 ABNORMAL LABORATORY TEST RESULT: Primary | ICD-10-CM

## 2024-11-12 LAB
ALBUMIN SERPL BCP-MCNC: 3.8 G/DL (ref 3.4–5)
ANION GAP SERPL CALC-SCNC: 12 MMOL/L (ref 10–20)
ATRIAL RATE: 70 BPM
BUN SERPL-MCNC: 39 MG/DL (ref 6–23)
CALCIUM SERPL-MCNC: 9.2 MG/DL (ref 8.6–10.3)
CHLORIDE SERPL-SCNC: 103 MMOL/L (ref 98–107)
CO2 SERPL-SCNC: 28 MMOL/L (ref 21–32)
CREAT SERPL-MCNC: 2.03 MG/DL (ref 0.5–1.05)
EGFRCR SERPLBLD CKD-EPI 2021: 25 ML/MIN/1.73M*2
GLUCOSE SERPL-MCNC: 111 MG/DL (ref 74–99)
MAGNESIUM SERPL-MCNC: 2.18 MG/DL (ref 1.6–2.4)
PHOSPHATE SERPL-MCNC: 3.9 MG/DL (ref 2.5–4.9)
POTASSIUM SERPL-SCNC: 4.6 MMOL/L (ref 3.5–5.3)
PR INTERVAL: 152 MS
Q ONSET: 225 MS
QRS COUNT: 12 BEATS
QRS DURATION: 90 MS
QT INTERVAL: 448 MS
QTC CALCULATION(BAZETT): 483 MS
QTC FREDERICIA: 471 MS
R AXIS: -20 DEGREES
SODIUM SERPL-SCNC: 138 MMOL/L (ref 136–145)
T AXIS: 53 DEGREES
T OFFSET: 449 MS
VENTRICULAR RATE: 70 BPM

## 2024-11-12 PROCEDURE — 80069 RENAL FUNCTION PANEL: CPT | Performed by: EMERGENCY MEDICINE

## 2024-11-12 PROCEDURE — 36415 COLL VENOUS BLD VENIPUNCTURE: CPT | Performed by: EMERGENCY MEDICINE

## 2024-11-12 PROCEDURE — 93010 ELECTROCARDIOGRAM REPORT: CPT | Performed by: EMERGENCY MEDICINE

## 2024-11-12 PROCEDURE — 99284 EMERGENCY DEPT VISIT MOD MDM: CPT | Performed by: EMERGENCY MEDICINE

## 2024-11-12 PROCEDURE — 83735 ASSAY OF MAGNESIUM: CPT | Performed by: EMERGENCY MEDICINE

## 2024-11-12 PROCEDURE — 99283 EMERGENCY DEPT VISIT LOW MDM: CPT

## 2024-11-12 PROCEDURE — 93005 ELECTROCARDIOGRAM TRACING: CPT

## 2024-11-12 ASSESSMENT — LIFESTYLE VARIABLES
TOTAL SCORE: 0
HAVE YOU EVER FELT YOU SHOULD CUT DOWN ON YOUR DRINKING: NO
EVER FELT BAD OR GUILTY ABOUT YOUR DRINKING: NO
EVER HAD A DRINK FIRST THING IN THE MORNING TO STEADY YOUR NERVES TO GET RID OF A HANGOVER: NO
HAVE PEOPLE ANNOYED YOU BY CRITICIZING YOUR DRINKING: NO

## 2024-11-12 ASSESSMENT — PAIN SCALES - GENERAL: PAINLEVEL_OUTOF10: 2

## 2024-11-12 ASSESSMENT — PAIN DESCRIPTION - LOCATION: LOCATION: BACK

## 2024-11-12 ASSESSMENT — PAIN - FUNCTIONAL ASSESSMENT: PAIN_FUNCTIONAL_ASSESSMENT: 0-10

## 2024-11-12 ASSESSMENT — PAIN DESCRIPTION - PAIN TYPE: TYPE: CHRONIC PAIN

## 2024-11-12 NOTE — TELEPHONE ENCOUNTER
Philippe   I got a hold of her this morning and sent her to the emergency room per your orders and I saw Dr. Edward's note as well and I looked at her potassium level, she was bummed out because she had an appointment with Dr. Petit so I reached out to him too, I let her know it was very important she needs to go and she agreed.

## 2024-11-12 NOTE — DISCHARGE INSTRUCTIONS
Your potassium level has more normalized to 4.2.  It is recommended to continue holding your spironolactone.  And follow with your primary care doctor and return the emergency room as directed.

## 2024-11-12 NOTE — TELEPHONE ENCOUNTER
Called patient regarding her High potassium level, unable to get hold of pt despite multiple calls. Left VM urging pt to go to ER.   Tried callign sisters's # as well, no answer either

## 2024-11-13 ENCOUNTER — HOSPITAL ENCOUNTER (OUTPATIENT)
Dept: CARDIOLOGY | Facility: CLINIC | Age: 79
Discharge: HOME | End: 2024-11-13
Payer: MEDICARE

## 2024-11-13 DIAGNOSIS — I47.29 PAROXYSMAL VENTRICULAR TACHYCARDIA (MULTI): ICD-10-CM

## 2024-11-13 DIAGNOSIS — Z95.810 ICD (IMPLANTABLE CARDIOVERTER-DEFIBRILLATOR) IN PLACE: ICD-10-CM

## 2024-11-13 PROCEDURE — 93296 REM INTERROG EVL PM/IDS: CPT

## 2024-11-14 ENCOUNTER — HOSPITAL ENCOUNTER (OUTPATIENT)
Dept: RADIOLOGY | Facility: CLINIC | Age: 79
Discharge: HOME | End: 2024-11-14
Payer: MEDICARE

## 2024-11-14 ENCOUNTER — TELEPHONE (OUTPATIENT)
Dept: PRIMARY CARE | Facility: CLINIC | Age: 79
End: 2024-11-14
Payer: MEDICARE

## 2024-11-14 DIAGNOSIS — C34.31 MALIGNANT NEOPLASM OF LOWER LOBE OF RIGHT LUNG (MULTI): ICD-10-CM

## 2024-11-14 PROCEDURE — 71250 CT THORAX DX C-: CPT | Performed by: RADIOLOGY

## 2024-11-14 PROCEDURE — 71250 CT THORAX DX C-: CPT

## 2024-11-15 ENCOUNTER — OFFICE VISIT (OUTPATIENT)
Dept: HEMATOLOGY/ONCOLOGY | Facility: CLINIC | Age: 79
End: 2024-11-15
Payer: MEDICARE

## 2024-11-15 ENCOUNTER — TELEPHONE (OUTPATIENT)
Dept: CARDIOLOGY | Facility: CLINIC | Age: 79
End: 2024-11-15
Payer: MEDICARE

## 2024-11-15 ENCOUNTER — NURSE TRIAGE (OUTPATIENT)
Dept: VASCULAR SURGERY | Facility: HOSPITAL | Age: 79
End: 2024-11-15
Payer: MEDICARE

## 2024-11-15 VITALS
OXYGEN SATURATION: 95 % | RESPIRATION RATE: 16 BRPM | HEART RATE: 75 BPM | SYSTOLIC BLOOD PRESSURE: 110 MMHG | BODY MASS INDEX: 19.8 KG/M2 | DIASTOLIC BLOOD PRESSURE: 72 MMHG | WEIGHT: 111.77 LBS | TEMPERATURE: 96.3 F

## 2024-11-15 DIAGNOSIS — I48.0 PAROXYSMAL A-FIB (MULTI): ICD-10-CM

## 2024-11-15 DIAGNOSIS — C34.2 MALIGNANT NEOPLASM OF MIDDLE LOBE OF RIGHT LUNG (MULTI): Primary | ICD-10-CM

## 2024-11-15 DIAGNOSIS — I10 BENIGN ESSENTIAL HYPERTENSION: ICD-10-CM

## 2024-11-15 DIAGNOSIS — J43.9 PULMONARY EMPHYSEMA, UNSPECIFIED EMPHYSEMA TYPE (MULTI): ICD-10-CM

## 2024-11-15 PROCEDURE — 1123F ACP DISCUSS/DSCN MKR DOCD: CPT | Performed by: INTERNAL MEDICINE

## 2024-11-15 PROCEDURE — 99214 OFFICE O/P EST MOD 30 MIN: CPT | Performed by: INTERNAL MEDICINE

## 2024-11-15 PROCEDURE — 3078F DIAST BP <80 MM HG: CPT | Performed by: INTERNAL MEDICINE

## 2024-11-15 PROCEDURE — 1159F MED LIST DOCD IN RCRD: CPT | Performed by: INTERNAL MEDICINE

## 2024-11-15 PROCEDURE — 1157F ADVNC CARE PLAN IN RCRD: CPT | Performed by: INTERNAL MEDICINE

## 2024-11-15 PROCEDURE — 1126F AMNT PAIN NOTED NONE PRSNT: CPT | Performed by: INTERNAL MEDICINE

## 2024-11-15 PROCEDURE — 3074F SYST BP LT 130 MM HG: CPT | Performed by: INTERNAL MEDICINE

## 2024-11-15 PROCEDURE — 99417 PROLNG OP E/M EACH 15 MIN: CPT | Performed by: INTERNAL MEDICINE

## 2024-11-15 ASSESSMENT — PAIN SCALES - GENERAL: PAINLEVEL_OUTOF10: 0-NO PAIN

## 2024-11-15 NOTE — TELEPHONE ENCOUNTER
Patient will like for Dr.Castro Das to give permission to Dr. Matthew Bowman MD for patient to stop taking her blood thinners for two days.  is requesting a fax, fax number is [phone] please contact patient for additional information thank you.

## 2024-11-15 NOTE — TELEPHONE ENCOUNTER
Copied from CRM #[id number]. Topic: Information Request - Doctor, Hospital, or Provider  >> Nov 6, 2024  1:33 PM Rea JOHNSON wrote:   Good afternoon.  Patient will like for Dr.Castro Das to give permission to Dr. Matthew Bowman MD for patient to stop taking her blood thinners for two days.  is requesting a fax, fax number is [phone] please contact patient for additional information thank you.    Dr. Dixon   I have  received this communication this morning.   I do not know  the type of procedure to be completed   - there is nothing  scheduled in EPIC   I have  called the patient , however  she has not returned my calls.   HOANG Oneil

## 2024-11-15 NOTE — TELEPHONE ENCOUNTER
Do you want her to take the Alendronate? She has never picked that up, if you want her to start it she will we probably have to call it in again to her pharmacy. Please advise

## 2024-11-15 NOTE — TELEPHONE ENCOUNTER
"Copied from CRM #[id number]. Topic: Information Request - Doctor, Hospital, or Provider  >> Nov 6, 2024  1:33 PM Rea JOHNSON wrote:   Good afternoon.  Patient will like for Dr.Castro Das to give permission to Dr. Matthew Bowman MD for patient to stop taking her blood thinners for two days.  is requesting a fax, fax number is [phone] please contact patient for additional information thank you.      I have had the pleasure of speaking with Ms. Tee.   The patient states that  she is going to have \" a  shot in my back to numb the nerves>'  The procedure  has been cancelled due to a recent hospitalization.  A new  procedure date has not been determined.   HOANG Oneil   "

## 2024-11-15 NOTE — PROGRESS NOTES
Patient ID: Shannan Tee is a 79 y.o. female.  Referring Physician: Mango Gilbert MD  [address]  [place] [address]  Primary Care Provider: Sunshine Warren MD  Visit Type: Follow Up      Subjective    HPI How was my CT scan?    Review of Systems   Constitutional: Negative.    HENT:  Negative.     Eyes: Negative.    Respiratory: Negative.     Cardiovascular: Negative.    Gastrointestinal: Negative.    Endocrine: Negative.    Genitourinary: Negative.     Musculoskeletal: Negative.    Skin: Negative.    Neurological: Negative.    Hematological: Negative.    Psychiatric/Behavioral: Negative.          Objective   BSA: There is no height or weight on file to calculate BSA.  There were no vitals taken for this visit.     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), Pain in unspecified ankle and joints of unspecified foot (08/29/2020), Personal history of other drug therapy, Personal history of other endocrine, nutritional and metabolic disease (08/18/2022), Personal history of other infectious and parasitic diseases (01/29/2018), Personal history of other specified conditions (09/04/2020), Radiculopathy, lumbar region (12/08/2021), Radiculopathy, lumbar region (07/02/2021), Tobacco abuse counseling (05/06/2022), Transient cerebral ischemic attack, unspecified, Unspecified mononeuropathy of unspecified lower limb (10/01/2021), and Urinary tract infection, site not specified (08/31/2020).   has a past surgical history that includes Other surgical history (11/22/2022) and CT angio aorta and bilateral iliofemoral runoff including without contrast if performed (3/8/2023).  Family History   Problem Relation Name Age of Onset    Hypertension Mother      Diabetes Mother      Stroke Father    Patient's mother has some concerns regarding the patient's health/cares and is looking for some recommendation. Patient will be having some kind of biopsy next week and other appointments for his health issues. Mother would like a return on her cell phone.   "    Oncology History    No history exists.       Edda Tee \"Shannan\"  reports that she has been smoking cigarettes. She has been exposed to tobacco smoke. She has never used smokeless tobacco.  She  reports no history of alcohol use.  She  reports no history of drug use.    Physical Exam  Vitals reviewed.   Constitutional:       Appearance: Normal appearance.   HENT:      Head: Normocephalic.      Mouth/Throat:      Mouth: Mucous membranes are moist.   Eyes:      Extraocular Movements: Extraocular movements intact.      Pupils: Pupils are equal, round, and reactive to light.   Cardiovascular:      Rate and Rhythm: Normal rate and regular rhythm.      Pulses: Normal pulses.      Heart sounds: Normal heart sounds.   Pulmonary:      Effort: Pulmonary effort is normal.      Breath sounds: Normal breath sounds.   Abdominal:      General: Bowel sounds are normal.      Palpations: Abdomen is soft.   Musculoskeletal:         General: Normal range of motion.      Cervical back: Normal range of motion and neck supple.   Skin:     General: Skin is warm.   Neurological:      General: No focal deficit present.      Mental Status: She is alert and oriented to person, place, and time.   Psychiatric:         Mood and Affect: Mood normal.         Behavior: Behavior normal.         WBC   Date/Time Value Ref Range Status   11/07/2024 03:34 PM 7.6 4.4 - 11.3 x10*3/uL Final   05/24/2024 09:17 AM 7.9 4.4 - 11.3 x10*3/uL Final   03/26/2024 03:02 PM 10.4 4.4 - 11.3 x10*3/uL Final     nRBC   Date Value Ref Range Status   11/07/2024 0.0 0.0 - 0.0 /100 WBCs Final   03/26/2024 0.0 0.0 - 0.0 /100 WBCs Final   03/21/2024 0.0 0.0 - 0.0 /100 WBCs Final     RBC   Date Value Ref Range Status   11/07/2024 4.17 4.00 - 5.20 x10*6/uL Final   05/24/2024 4.42 4.00 - 5.20 x10*6/uL Final   03/26/2024 4.53 4.00 - 5.20 x10*6/uL Final     Hemoglobin   Date Value Ref Range Status   11/07/2024 12.9 12.0 - 16.0 g/dL Final   05/24/2024 13.8 12.0 - 16.0 g/dL " "Final   03/26/2024 14.3 12.0 - 16.0 g/dL Final     Hematocrit   Date Value Ref Range Status   11/07/2024 41.8 36.0 - 46.0 % Final   05/24/2024 42.7 36.0 - 46.0 % Final   03/26/2024 44.9 36.0 - 46.0 % Final     MCV   Date/Time Value Ref Range Status   11/07/2024 03:34  80 - 100 fL Final   05/24/2024 09:17 AM 97 80 - 100 fL Final   03/26/2024 03:02 PM 99 80 - 100 fL Final     MCH   Date/Time Value Ref Range Status   11/07/2024 03:34 PM 30.9 26.0 - 34.0 pg Final   05/24/2024 09:17 AM 31.2 26.0 - 34.0 pg Final   03/26/2024 03:02 PM 31.6 26.0 - 34.0 pg Final     MCHC   Date/Time Value Ref Range Status   11/07/2024 03:34 PM 30.9 (L) 32.0 - 36.0 g/dL Final   05/24/2024 09:17 AM 32.3 32.0 - 36.0 g/dL Final   03/26/2024 03:02 PM 31.8 (L) 32.0 - 36.0 g/dL Final     RDW   Date/Time Value Ref Range Status   11/07/2024 03:34 PM 14.9 (H) 11.5 - 14.5 % Final   05/24/2024 09:17 AM 14.4 11.5 - 14.5 % Final   03/26/2024 03:02 PM 15.5 (H) 11.5 - 14.5 % Final     Platelets   Date/Time Value Ref Range Status   11/07/2024 03:34  150 - 450 x10*3/uL Final   05/24/2024 09:17  150 - 450 x10*3/uL Final   03/26/2024 03:02  150 - 450 x10*3/uL Final     No results found for: \"MPV\"  Neutrophils %   Date/Time Value Ref Range Status   11/07/2024 03:34 PM 55.5 40.0 - 80.0 % Final   05/24/2024 09:17 AM 62.2 40.0 - 80.0 % Final   03/26/2024 03:02 PM 66.4 40.0 - 80.0 % Final     Immature Granulocytes %, Automated   Date/Time Value Ref Range Status   11/07/2024 03:34 PM 0.3 0.0 - 0.9 % Final     Comment:     Immature Granulocyte Count (IG) includes promyelocytes, myelocytes and metamyelocytes but does not include bands. Percent differential counts (%) should be interpreted in the context of the absolute cell counts (cells/UL).   05/24/2024 09:17 AM 0.1 0.0 - 0.9 % Final     Comment:     Immature Granulocyte Count (IG) includes promyelocytes, myelocytes and metamyelocytes but does not include bands. Percent differential counts (%) " should be interpreted in the context of the absolute cell counts (cells/UL).   03/26/2024 03:02 PM 1.4 (H) 0.0 - 0.9 % Final     Comment:     Immature Granulocyte Count (IG) includes promyelocytes, myelocytes and metamyelocytes but does not include bands. Percent differential counts (%) should be interpreted in the context of the absolute cell counts (cells/UL).     Lymphocytes %   Date/Time Value Ref Range Status   11/07/2024 03:34 PM 28.0 13.0 - 44.0 % Final   05/24/2024 09:17 AM 21.8 13.0 - 44.0 % Final   03/26/2024 03:02 PM 19.9 13.0 - 44.0 % Final     Monocytes %   Date/Time Value Ref Range Status   11/07/2024 03:34 PM 12.5 2.0 - 10.0 % Final   05/24/2024 09:17 AM 12.1 2.0 - 10.0 % Final   03/26/2024 03:02 PM 10.5 2.0 - 10.0 % Final     Eosinophils %   Date/Time Value Ref Range Status   11/07/2024 03:34 PM 2.8 0.0 - 6.0 % Final   05/24/2024 09:17 AM 3.3 0.0 - 6.0 % Final   03/26/2024 03:02 PM 1.4 0.0 - 6.0 % Final     Basophils %   Date/Time Value Ref Range Status   11/07/2024 03:34 PM 0.9 0.0 - 2.0 % Final   05/24/2024 09:17 AM 0.5 0.0 - 2.0 % Final   03/26/2024 03:02 PM 0.4 0.0 - 2.0 % Final     Neutrophils Absolute   Date/Time Value Ref Range Status   11/07/2024 03:34 PM 4.23 1.60 - 5.50 x10*3/uL Final     Comment:     Percent differential counts (%) should be interpreted in the context of the absolute cell counts (cells/uL).   05/24/2024 09:17 AM 4.94 1.60 - 5.50 x10*3/uL Final     Comment:     Percent differential counts (%) should be interpreted in the context of the absolute cell counts (cells/uL).   03/26/2024 03:02 PM 6.86 (H) 1.60 - 5.50 x10*3/uL Final     Comment:     Percent differential counts (%) should be interpreted in the context of the absolute cell counts (cells/uL).     Immature Granulocytes Absolute, Automated   Date/Time Value Ref Range Status   11/07/2024 03:34 PM 0.02 0.00 - 0.50 x10*3/uL Final   05/24/2024 09:17 AM 0.01 0.00 - 0.50 x10*3/uL Final   03/26/2024 03:02 PM 0.15 0.00 - 0.50  "x10*3/uL Final     Lymphocytes Absolute   Date/Time Value Ref Range Status   11/07/2024 03:34 PM 2.13 0.80 - 3.00 x10*3/uL Final   05/24/2024 09:17 AM 1.73 0.80 - 3.00 x10*3/uL Final   03/26/2024 03:02 PM 2.06 0.80 - 3.00 x10*3/uL Final     Monocytes Absolute   Date/Time Value Ref Range Status   11/07/2024 03:34 PM 0.95 (H) 0.05 - 0.80 x10*3/uL Final   05/24/2024 09:17 AM 0.96 (H) 0.05 - 0.80 x10*3/uL Final   03/26/2024 03:02 PM 1.09 (H) 0.05 - 0.80 x10*3/uL Final     Eosinophils Absolute   Date/Time Value Ref Range Status   11/07/2024 03:34 PM 0.21 0.00 - 0.40 x10*3/uL Final   05/24/2024 09:17 AM 0.26 0.00 - 0.40 x10*3/uL Final   03/26/2024 03:02 PM 0.15 0.00 - 0.40 x10*3/uL Final     Basophils Absolute   Date/Time Value Ref Range Status   11/07/2024 03:34 PM 0.07 0.00 - 0.10 x10*3/uL Final   05/24/2024 09:17 AM 0.04 0.00 - 0.10 x10*3/uL Final   03/26/2024 03:02 PM 0.04 0.00 - 0.10 x10*3/uL Final       No components found for: \"PT\"  aPTT   Date/Time Value Ref Range Status   03/30/2023 09:19 PM 28 26 - 39 sec Final     Comment:       THE APTT IS NO LONGER USED FOR MONITORING     UNFRACTIONATED HEPARIN THERAPY.    FOR MONITORING HEPARIN THERAPY,     USE THE HEPARIN ASSAY.     11/13/2022 02:30 PM 32 26 - 39 sec Final     Comment:       THE APTT IS NO LONGER USED FOR MONITORING     UNFRACTIONATED HEPARIN THERAPY.    FOR MONITORING HEPARIN THERAPY,     USE THE HEPARIN ASSAY.       Medication Documentation Review Audit       Reviewed by Na Bustillo MA (Medical Assistant) on 11/15/24 at 1025      Medication Order Taking? Sig Documenting Provider Last Dose Status   alendronate (Fosamax) 70 mg tablet [id number] No Take 1 tablet (70 mg) by mouth every 7 days. Take in the morning with a full glass of water, on an empty stomach, and do not take anything else by mouth or lie down for the next 30 min.   Patient not taking: Reported on 11/15/2024    Sunshine Warren MD Taking Active   amiodarone (Pacerone) 200 mg tablet " [id number] Yes TAKE ONE TABLET BY MOUTH EVERY DAY Keenan Edward MD  Active   apixaban (Eliquis) 2.5 mg tablet [id number] Yes Take 1 tablet (2.5 mg) by mouth 2 times a day. Keenan Edward MD  Active   calcitonin, salmon, (Miacalcin) 200 unit/actuation nasal spray [id number] Yes Administer 1 spray into one nostril once daily. Sunshine Warren MD Taking Active   carvedilol (Coreg) 6.25 mg tablet [id number] Yes Take 0.5 tablets (3.125 mg) by mouth 2 times a day with meals. Sunshine Warren MD Taking Active   ceFAZolin (Ancef) injection 1 g [id number]   Matthew Bowman MD  Active   ergocalciferol (Vitamin D-2) 1.25 MG (84843 UT) capsule [id number] Yes Take 1 capsule (50,000 Units) by mouth 1 (one) time per week. Sunshine Warren MD Taking Active   hydrOXYzine HCL (Atarax) 25 mg tablet [id number] Yes TAKE ONE TABLET BY MOUTH TWO TIMES A DAY Sunshine Warren MD Taking Active   lidocaine (Lidoderm) 5 % patch [id number] Yes Place 1 patch over 12 hours on the skin once daily. Apply to painful area 12 hours per day, remove for 12 hours. Sunshine Warren MD Taking Active   MAGNESIUM GLUCONATE ORAL [id number] Yes Take 1 tablet by mouth once daily. 500 MG Oral Tablet Historical Provider, MD Taking Active   spironolactone (Aldactone) 25 mg tablet [id number] Yes TAKE ONE-HALF TABLET BY MOUTH DAILY Benedicto Perez MD Taking Active   vit C/E/Zn/coppr/lutein/zeaxan (PRESERVISION AREDS-2 ORAL) [id number] Yes Take 1 capsule by mouth once daily. Historical Provider, MD Taking Active   vitamin E 180 mg (400 unit) capsule [id number] Yes Take 1 capsule (400 Units) by mouth once daily. Historical Provider, MD Taking Active                   Assessment/Plan    1) NSCLC  -here for interval followup  -brain MRI was negative  -PET scan showed no extrathoracic disease  -reviewed in thoracic tumor bd --pt was recommended surgery vs SBRT vs  [place] [id number] study  -pt declined surgery  -[facility] - study entails SBRT followed by 1 year of immunotherapy, but  study is conducted at [facility]--pt declined going to [facility]  -treated with SBRT (5 treatments) from 1/30 to 2/3/2022  -5/8/2023 PET showed interval decrease in size of RML mass with mild residual activity  -11/10/2023 chest CT showed interval decrease in size of subcarinal node 1.2 cm, interval decrease in size of right lower paratracheal node 0.7 cm; similar size of consolidative airspace opacity within RML 3.3 x 1.6 cm  with surrounding bandlike opacities with associated traction bronchiectasis and volume loss compatible with postradiation fibrosis  -chest CT done on 5/14/2024 reviewed-mild to moderate emphysematous changes in the lungs with upper lobe predominance; previous bilateral pleural effusions have resolved; previous interstitial edema has resolved; areas of infiltrative density in anteromedial mid to lower right upper lobe have resolved; there is persistent pleural and parenchymal density with mild bronchiectasis in anteromedial mid to lower right middle lobe unchanged; mild and grossly stable central mediastinal adenopathy with lymph nodes measuring 10 mm or less; prominent compression fracture at T7 progressed since 3/21/2024 with mild paraspinal soft tissue swelling; there is also subtle interval erosion of anterior inferior aspect of the T6 vertebral body  -here for interval followup  -labs done on 11/7/2024  included CBC, COMP and CEA  -results reviewed; wbc 7.6, hgb 12.9, plt 159,000, creatinine 1.96,  potassium 6.2, AST 21, ALT 20, CEA 5.2  -pt was advised by her cardiologist to go to the ED; redraw in ED showed normal potassium, was told to hold her aldactone  -CT chest done on 11/14/2024 reviewed-severe emphysema; RML nodule with adjacent atelectasis similar 27 x 19 mm; RUL spiculated nodule along minor fissure enlarged from 5 mm to 8 mm; a few scattered additional tiny nodules are not changed; no new nodules or areas of consolidation; bronchiectasis involving RML persists; enlarged mediastinal lymph  nodes not significantly changed (12 mm in subcarinal region, 10mm in precarinal region); unchanged severe anterior wedging at T7 with interval vertebroplasty changes; saccular aneurysm of the distal descending thoracic aorta 5.4 cm increased from 4.0 cm  -will let her cardiovascular doctor (Dr Zack Rojas) know about the saccular aneurysm  -will see her again in 6 months        2) hypertension  -on lasix  -on coreg  -on lisinopril     3) COPD  -on spiriva  -quit smoking in 11/2022     4) atrial fibrillation  -on eliquis    -on amiodarone           Problem List Items Addressed This Visit             ICD-10-CM    Lung cancer (Multi) C34.90    Relevant Orders    Clinic Appointment Request Follow Up; [facility]; [id number]    CBC and Auto Differential    Comprehensive metabolic panel    CEA            Mango Funez MD

## 2024-11-18 ENCOUNTER — TELEPHONE (OUTPATIENT)
Dept: RADIATION ONCOLOGY | Facility: HOSPITAL | Age: 79
End: 2024-11-18
Payer: MEDICARE

## 2024-11-18 NOTE — TELEPHONE ENCOUNTER
11/19/2024 at 2:00 Pt CONFIRMED and will be present.    Pt is aware that the appointment is a phone/virtual visit, pt. understands that he/she doesn't have to show up in office.

## 2024-11-19 ENCOUNTER — HOSPITAL ENCOUNTER (OUTPATIENT)
Dept: RADIATION ONCOLOGY | Facility: HOSPITAL | Age: 79
Setting detail: RADIATION/ONCOLOGY SERIES
Discharge: HOME | End: 2024-11-19
Payer: MEDICARE

## 2024-11-19 DIAGNOSIS — C34.31 MALIGNANT NEOPLASM OF LOWER LOBE OF RIGHT LUNG (MULTI): Primary | ICD-10-CM

## 2024-11-19 PROCEDURE — 99214 OFFICE O/P EST MOD 30 MIN: CPT | Mod: 95 | Performed by: NURSE PRACTITIONER

## 2024-11-19 PROCEDURE — 99417 PROLNG OP E/M EACH 15 MIN: CPT | Mod: 95 | Performed by: NURSE PRACTITIONER

## 2024-11-19 PROCEDURE — 99214 OFFICE O/P EST MOD 30 MIN: CPT | Performed by: NURSE PRACTITIONER

## 2024-11-19 ASSESSMENT — ENCOUNTER SYMPTOMS
GASTROINTESTINAL NEGATIVE: 1
ACTIVITY CHANGE: 1
FEVER: 0
APNEA: 0
SHORTNESS OF BREATH: 1
HEMATOLOGIC/LYMPHATIC NEGATIVE: 1
UNEXPECTED WEIGHT CHANGE: 0
CARDIOVASCULAR NEGATIVE: 1
APPETITE CHANGE: 0
BACK PAIN: 1
CHILLS: 0
CHOKING: 0
DIAPHORESIS: 0
CHEST TIGHTNESS: 0
COUGH: 0
WEAKNESS: 1
FATIGUE: 0
PSYCHIATRIC NEGATIVE: 1

## 2024-11-19 NOTE — PROGRESS NOTES
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 Patient is a 79 y.o. female who presents today for follow up 2 years and 9 months s/p SBRT to the right lung with Dr. Lundy. Patient's biggest complaint is pain around the rib cage. Last visit she had two fractures. She has been following with pain management. She had one injection. Was not sure if it helped. Was scheduled for a second injection but ended up in hospital with high potassium. She needs to also get clearance to hold Eliquis for 2 days. 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 It was not clearly characterized due to lack of contrast. She is waiting to hear from covering cardiologist for an appointment as hers is on maternity leave. Appetite is good. Weight stable. Breathing is baseline. Gets SOB with exertion but feels that is more related to her inactivity from the pain. Denies cough, chest pain or fevers. No oxygen or inhaler requirements. Smokes 4-5 cigarettes a day. Down from 1ppd. No neurological symptoms. Sprained ankle two weeks ago. States she got up to fast and her slipper got caught on rug. Slowly recovering.  She had follow up with Dr. Gilbert on 11/15/24.     Review of systems:  Review of Systems   Constitutional:  Positive for activity change. Negative for appetite change, chills, diaphoresis, fatigue, fever and unexpected weight change.   HENT: Negative.     Respiratory:  Positive for shortness of breath (with exertion). Negative for " apnea, cough, choking and chest tightness.    Cardiovascular: Negative.    Gastrointestinal: Negative.    Musculoskeletal:  Positive for back pain.   Skin: Negative.    Neurological:  Positive for weakness.   Hematological: Negative.    Psychiatric/Behavioral: Negative.         Past Medical history  She  has a past surgical history that includes Other surgical history (11/22/2022) and CT angio aorta and bilateral iliofemoral runoff including without contrast if performed (3/8/2023).    Radiology results:  CT chest wo IV contrast 11/14/2024    Narrative  Interpreted By:  Jeff Mireles,  STUDY:  CT CHEST WO IV CONTRAST;  11/14/2024 10:38 am    INDICATION:  Signs/Symptoms:history of lung cancer s/p SBRT.    COMPARISON:  05/14/2024    ACCESSION NUMBER(S):  [id number]    ORDERING CLINICIAN:  ANGELINA STRONG    TECHNIQUE:  Helical data acquisition of the chest was obtained  without IV  contrast.  Images were reformatted in axial, coronal, and sagittal  planes.    FINDINGS:  LUNGS and AIRWAYS:  Severe emphysema. Right middle lobe nodule with adjacent atelectasis  is similar prior exam measuring 27 x 19 mm. Right upper lobe  spiculated nodule along the minor fissure is enlarged from 5 mm to 8  mm on lung window axial image 152/334. A few scattered additional  tiny nodules are otherwise not significantly changed.    No new nodules or areas of consolidation. No pleural effusion or  pneumothorax. Central airways are patent. Bronchiectasis involving  the right middle lobe persists.    MEDIASTINUM and ADY, LOWER NECK AND AXILLA:  The visualized thyroid gland is grossly unremarkable.    Enlarged mediastinal lymph nodes are not significantly changed for  example measuring 12 mm short axis in the subcarinal region at 10 mm  in the precarinal region.    Esophagus is not dilated.    HEART and VESSELS:  Mild cardiomegaly. No pericardial effusion.  Severe coronary atherosclerosis.  Severe thoracic aortic atherosclerosis. Saccular  aneurysm of the  distal descending thoracic aorta measures 5.4 cm in diameter on  coronal images, increased from 4.0 cm on 11/13/2022. Please note  accurate measurements are difficult to obtain due to the tortuosity  of the aorta at this level and lack of IV contrast.    UPPER ABDOMEN:  Numerous bilateral renal cysts are noted, few of which appear to  demonstrate internal hemorrhage.    CHEST WALL and OSSEOUS STRUCTURES:  Unchanged severe anterior wedging at T7 with interval vertebroplasty  changes. Degenerative changes of the thoracic spine. No new  suspicious skeletal lesions identified. Partially imaged lumbar  levoscoliosis.    Impression  1.  Right middle lobe nodule with adjacent atelectasis not  significantly changed compared to 6 months ago.  2. Right upper lobe nodule has enlarged from 5 mm to 8 mm however. No  new primary neoplasm or metastatic lesion are not excluded.  3. Mild mediastinal lymphadenopathy is not significantly changed.  4. Difficult to characterize without IV contrast, there appears to be  progressive enlargement of saccular aneurysm of the descending  thoracic aorta, from approximately 4.0 cm to 5.4 cm over a 2 year  span. Consider CT angiography of the chest for more accurate  measurements.      MACRO:  None.    Signed by: Jeff Mireles 11/14/2024 4:10 PM  Dictation workstation:   [id number]       Plan:  Assessment/Plan     79 year old female 2 years and 9 months s/p SBRT to the right lung. Doing well from a radiation stand point. Breathing stable. CT of the chest done 11/14/24. Scan showed right middle lobe nodule with adjacent atelectasis not significantly changed compared to 6 months ago. Right upper lobe nodule has enlarged from 5 mm to 8 mm however. No new primary neoplasm or metastatic lesion are not excluded. Mild mediastinal lymphadenopathy is not significantly changed.  Difficult to characterize without IV contrast, there appears to be  progressive enlargement of saccular  aneurysm of the descending  thoracic aorta, from approximately 4.0 cm to 5.4 cm over a 2 year  span. Patient awaiting appt for evaluation of aneurysm. Continue follow up with pain management and cardiology as scheduled. Continue follow up with Dr. Gilbert as scheduled. Explained that the RUL nodule as grown but may be to small for PET to . Will order short interval scan to assess stability of nodule. If continued growth will order PET CT. Patient to return to clinic in 3 months with CT of the chest prior unless needs to be seen sooner. Instructed to call with any questions or concerns.     I performed this visit using real- time telehealth tools , including an audio/video or telephone connection between Edda Tee , who is in their home and Aracelis Tony CNP at [facility].

## 2024-11-19 NOTE — TELEPHONE ENCOUNTER
She is aware, they are referring her to another cardiologist because of the aneurysm, and her cardiologist is on Maternity leave but right now she will not take the alendronate\.

## 2024-11-20 ENCOUNTER — TELEPHONE (OUTPATIENT)
Dept: PAIN MEDICINE | Facility: CLINIC | Age: 79
End: 2024-11-20
Payer: MEDICARE

## 2024-11-20 ENCOUNTER — TELEPHONE (OUTPATIENT)
Dept: PRIMARY CARE | Facility: CLINIC | Age: 79
End: 2024-11-20
Payer: MEDICARE

## 2024-11-20 DIAGNOSIS — I73.9 PAD (PERIPHERAL ARTERY DISEASE) (CMS-HCC): Primary | ICD-10-CM

## 2024-11-20 NOTE — TELEPHONE ENCOUNTER
Pt is asking for a call back to discuss a referral for Dr Dixon. States she cannot go downtown and would like to get a new referral. Please advise. Thank you!

## 2024-11-22 NOTE — TELEPHONE ENCOUNTER
So left pt a message that will put in for DR Winn since she is at [facility]   Also aware there maybe a few months wait for appointment and has to reach out to me with her pain issues and if we are stable still

## 2024-11-23 ASSESSMENT — ENCOUNTER SYMPTOMS
NEUROLOGICAL NEGATIVE: 1
MUSCULOSKELETAL NEGATIVE: 1
RESPIRATORY NEGATIVE: 1
CARDIOVASCULAR NEGATIVE: 1
EYES NEGATIVE: 1
GASTROINTESTINAL NEGATIVE: 1
HEMATOLOGIC/LYMPHATIC NEGATIVE: 1
CONSTITUTIONAL NEGATIVE: 1
PSYCHIATRIC NEGATIVE: 1
ENDOCRINE NEGATIVE: 1

## 2024-12-03 DIAGNOSIS — L29.9 ITCHING: ICD-10-CM

## 2024-12-03 RX ORDER — HYDROXYZINE HYDROCHLORIDE 25 MG/1
25 TABLET, FILM COATED ORAL 2 TIMES DAILY
Qty: 90 TABLET | Refills: 0 | Status: SHIPPED | OUTPATIENT
Start: 2024-12-03

## 2024-12-10 ENCOUNTER — HOSPITAL ENCOUNTER (OUTPATIENT)
Dept: PAIN MEDICINE | Facility: CLINIC | Age: 79
Discharge: HOME | End: 2024-12-10
Payer: MEDICARE

## 2024-12-10 ENCOUNTER — TELEPHONE (OUTPATIENT)
Dept: PRIMARY CARE | Facility: CLINIC | Age: 79
End: 2024-12-10

## 2024-12-10 VITALS
SYSTOLIC BLOOD PRESSURE: 130 MMHG | DIASTOLIC BLOOD PRESSURE: 66 MMHG | HEART RATE: 88 BPM | RESPIRATION RATE: 18 BRPM | TEMPERATURE: 97.3 F | OXYGEN SATURATION: 92 %

## 2024-12-10 DIAGNOSIS — M47.812 CERVICAL SPONDYLOSIS WITHOUT MYELOPATHY: ICD-10-CM

## 2024-12-10 DIAGNOSIS — R05.1 ACUTE COUGH: Primary | ICD-10-CM

## 2024-12-10 DIAGNOSIS — R06.02 SOB (SHORTNESS OF BREATH): ICD-10-CM

## 2024-12-10 PROCEDURE — 64493 INJ PARAVERT F JNT L/S 1 LEV: CPT | Mod: 50 | Performed by: PAIN MEDICINE

## 2024-12-10 PROCEDURE — 64494 INJ PARAVERT F JNT L/S 2 LEV: CPT | Mod: 50 | Performed by: PAIN MEDICINE

## 2024-12-10 PROCEDURE — 64493 INJ PARAVERT F JNT L/S 1 LEV: CPT | Performed by: PAIN MEDICINE

## 2024-12-10 PROCEDURE — 64494 INJ PARAVERT F JNT L/S 2 LEV: CPT | Performed by: PAIN MEDICINE

## 2024-12-10 ASSESSMENT — PAIN SCALES - GENERAL: PAINLEVEL_OUTOF10: 5 - MODERATE PAIN

## 2024-12-10 ASSESSMENT — PAIN - FUNCTIONAL ASSESSMENT: PAIN_FUNCTIONAL_ASSESSMENT: 0-10

## 2024-12-10 NOTE — DISCHARGE INSTRUCTIONS
Post-injection instructions FOR FACET BLOCK      Pay attention to how much pain relief (what percentage compared to before the procedure) you get and for how long it lasts.     THIS IS A TEMPORARY NUMBING OF PAIN THIS IS BEING USED AS A DIAGNOSTIC INDICATOR IF THIS IS THE SOURCE OF YOUR PAIN    Activity:  RETURN TO NORMAL ACTIVITY  SEE IF YOU CAN APPRECIATE AN IMPROVEMENT IN THE PAIN    Bandages: Remove after 24 hours     Showering/Bathing: You may shower after bandage is removed     Follow up: CALL OFFICE NEXT BUSINESS -[phone] LEAVE MESSAGE ABOUT THE % OF RELIEF THAT WAS OBTAINED AND FOR HOW MANY HOURS      Call the OFFICE immediately: if you notice:     Excessive bleeding from procedure site (brisk bright red bleeding from the site or bleeding that soaks the bandages or does not stop)   Severe headache  Inability to walk, leg or arm weakness or numbness that is worse after the procedure   Uncontrolled pain   New urinary or fecal incontinence   Signs of infection: Fever above 101.5F, redness, swelling, pus or drainage from the site

## 2024-12-10 NOTE — TELEPHONE ENCOUNTER
Pt of Dr Warren's. Calling in with concern's for walking pneumonia and she would like a possible chest xray ordered. Pt declined UC/ER. Please advise. Thank you!

## 2024-12-11 ENCOUNTER — HOSPITAL ENCOUNTER (OUTPATIENT)
Dept: RADIOLOGY | Facility: HOSPITAL | Age: 79
Discharge: HOME | End: 2024-12-11
Payer: MEDICARE

## 2024-12-11 DIAGNOSIS — R05.1 ACUTE COUGH: ICD-10-CM

## 2024-12-11 DIAGNOSIS — R06.02 SOB (SHORTNESS OF BREATH): ICD-10-CM

## 2024-12-11 PROCEDURE — 71046 X-RAY EXAM CHEST 2 VIEWS: CPT

## 2024-12-11 PROCEDURE — 71046 X-RAY EXAM CHEST 2 VIEWS: CPT | Performed by: RADIOLOGY

## 2024-12-11 NOTE — PROGRESS NOTES
Subjective   Patient ID: Shannan Tee is a 79 y.o. female who presents for UR Sx       She complains of a persistent cough  She has mild wheezing at night. The cough interferes with her sleep  The cough is sometimes productive   She is not taking Mucinex   She has not been around sick contacts         HEALTH:  PAP no longer needs to repeat   Mammo 1/08, 2016, 5/19, 8/20, 8/21, no longer needs to repeat   BD 4/16 T-1.9, 5/19 T-2.1, 8/21 T-2.5, 8/23 T-3.1.   Colon 6/08 adenoma, 5/12 and declines repeat. Declines Cologuard   ECHO 9/17, 5/2021, 3/23   Carotids 9/17 <50%    EKG 7/21, 5/22, 8/22, 4/23, 3/24, 5/24, 11/24 ED     Urine 2017, 1/19   Hep C 8/18 -  FLU 1/23, 10/24   TDAP 2013, 6/23 with injury   Arexvy recommended and will consider   Prevnar 5/17  Pneumo 1/2011  Shingrix recommend and declines   SARS CVD vaccine declines   Ophth Seen in 2/2024 for corneal abrasion left eye. She had cataract extraction in 2018. She is not wearing glasses except for night driving. No glaucoma or MD.  Copy of her Advanced Directives scanned in her chart 3/2023       Review of Systems  All systems negative except those listed in the HPI      Objective   Visit Vitals  /73   Pulse 58   Temp 36.2 °C (97.2 °F)    Body mass index is 19.84 kg/m².      Physical Exam  Vitals reviewed.   Constitutional:       Appearance: Normal appearance.   HENT:      Head: Normocephalic.      Right Ear: Tympanic membrane, ear canal and external ear normal.      Left Ear: Tympanic membrane, ear canal and external ear normal.      Nose: Nose normal.      Mouth/Throat:      Pharynx: Oropharynx is clear.   Eyes:      Conjunctiva/sclera: Conjunctivae normal.   Cardiovascular:      Rate and Rhythm: Normal rate and regular rhythm.      Pulses: Normal pulses.      Heart sounds: Normal heart sounds.      Comments: She is not in a- fib  Pulmonary:      Effort: Pulmonary effort is normal.      Comments: no wheezing, she has a lot of rhonchi noted    Abdominal:      General: Bowel sounds are normal.      Palpations: Abdomen is soft.   Musculoskeletal:         General: Normal range of motion.      Cervical back: Normal range of motion.   Skin:     General: Skin is warm.   Neurological:      General: No focal deficit present.      Mental Status: She is alert and oriented to person, place, and time.   Psychiatric:         Mood and Affect: Mood normal.         Behavior: Behavior normal.         Thought Content: Thought content normal.         Judgment: Judgment normal.       Assessment/Plan   Problem List Items Addressed This Visit       Junctional bradycardia    Paroxysmal A-fib (Multi)     Other Visit Diagnoses       COPD exacerbation (Multi)    -  Primary              Follow up completed  Reviewed her labs from 11/24     She is  with 2 sons.   Her son (Oliver) passed on 6/27/2023 from cirrhosis at age 42.      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 Reviewed results of CXR from 12/24 with patient today 12/24.   She complains of a persistent cough.   She has mild wheezing at night. The cough interferes with her sleep  The cough is sometimes productive   She is not taking Mucinex   She has not been around sick contacts   CX 12/24 No active disease in the chest identified.   Recommend she begin OTC Mucinex BID until Sx resolve  Prescription sent in for Augmentin 875 mg BID for 10 days, possible side effects discussed with medication   Prescription sent in for Tessalon Perles 200 mg to use up to TID/ cough  Encouraged rest and increased hydration with warm/ tepid fluids  Recommend humidifier use at night during winter months (Oct- Mar)   She will call if Sx persist or worsen     Seen in the ED 11/12/24 for high potassium   EKG 11/24 paced   Potassium improved  and patient discharged    Diuretic adjusted to not waste potassium       Seen in the ED on 3/21/2024 for pneumonia and T7 compression fracture :    CT angio chest and CT abdomen pelvis 3/24 aneurysmal dilatation of the ascending thoracic aorta was noted as well as a descending thoracic aortic aneurysm and interstitial edema small bilateral effusions and patchy airspace density right upper lobe as well as mediastinal adenopathy and pulmonary nodule noted   Treated for pneumonia with Augmentin and doxycycline   Xray L/S 5/24 Advanced lumbar degenerative changes/scoliosis similar to previous   Continue Fosamax 70 mg weekly and calcitonin NS daily.   -s/p kyphoplasty with spine fermín T6- 7 on 7/24/2024 with Dr Bowman     She aw Dr Bowman in 9/24            She quit smoking 5/12/2024 for 2 months but has restarted smoking        Her son did laser treatment for smoking.        Nicotine dependence I spent more than 3 minutes counseling patient about the need        for smoking cessation and how I can support efforts when patient is ready to quit.        Discussed nicotine replacement therapy, Varenicline, Buproprion, hypnosis, support        groups and acupuncture as potential options. Patient currently has no signs or        symptoms of tobacco related disease.       (Dx code F17.2 or Z87.891)(Billing code 64134 or 61693)        Her hearing is not as good as it used to be   She declines appt with audiology for now 1/2023      Her weight/ BMI is in normal range in office, recommend she maintain  Her weight is 106 and BMI 18.78 IO 10/2024  She is not eating well but had a nice meal last night  She sometimes has emesis postprandial       HTN: BP normal.    Recommend when she stand she count to 10 prior to taking her first steps. Recommend increased sodium in her diet   Continue atenolol 25 mg daily and hydroxyzine 25 mg TID   ECHO 5/2021 was normal but poor functional capacity   Stress test normal 5/2021  EKG 5/24 a- fib  with RVR   Recommend she monitor her BP at home and call with elevated readings.    She saw Dr Edward in 11/24     Long standing atrial Fib with prior TIA in 9/2017: On exam: she is not in a- fib 10/24.   Continue carvedilol 6.25 mg BID and amiodarone 200 mg daily  Continue spironolactone 25 mg daily and atenolol 25 mg daily  Continue Eliquis 2.5 mg BID and hydroxyzine 25 mg BID  - EP did an implantable loop on 11/18/2022     - S/p DC shock and dual chamber ICD interrogation and reporgramming on 4/20/2023 with Dr Perez    EKG 5/24 a- fib with RVR    Carotid duplex: <50% stenosis bilateral vertebra arteries patent with antegrade flow  She was on Pradaxa then Eliquis since 9/17 after she was admitted with TIA 9/17   She saw Dr Edward in 11/24       Acute respiratory failure and cardiac tamponade.   Admitted on 3/30/2023 and discharged on 4/4/2023  She presented on 3/30/2023 for elective outpatient ICD placement which was complicated by cardiac tamponade and acute hypoxic respiratory failure.    - S/p percutaneous pericardiocentesis on 3/30/2023.   Approximately 400 to 500 cc of blood removed from the pericardial sac.  She saw Dr Robles in 4/2023. Recommend seeing Dr Edward, Dr Cohen or Dr Perez    Recommend and orders placed for cardiology 3/2024- recommend Dr Edward    She saw Dr Zack Das in 11/24     Middle cerebral aneurysm right side :  She needs CT angio and has to schedule since 2 mm only      We spent 15 minutes face to face discussing her cardiac risks.   We discussed the patients cardiovascular risk. If needed, lifestyle modifications recommended including: behavioral therapies of nutrition choices, exercise and eliminate habits that are contributing to their cardiac risk. We agreed to a plan to decrease his cardiovascular risks. Discussed ASA. Reviewed Guidelines and approved recommendations made to patient.   The patient's 10 yr CV risk was estimated at  22.1 % 6/2024      Elevated lipids:     Explained goal for LDL to be less than 100 and ideally less than 70   She stopped atorvastatin and does not want to resume it   Discussed making healthier food choices and increased exercise      Hyperparathyroid: .7 on labs in 3/2024  Recommend she begin calcium 600 mg BID      CKD: Cr 1.6 on labs in 3/2024.   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   Dr Pina did EBUS on 12/19/2022 showed Interval increase in size of right middle lobe mass when compared with the previous CT from 08/23/2021, concerning for neoplastic etiology. Interval increase in size of multiple mediastinal lymph nodes as detailed above, concerning for metastatic lymphadenopathy. Moderate upper lobe predominant emphysematous changes.   Pathology results 12/2022 showed malignant cells derived from SCC   -treated with SBRT (5 treatments) from 1/30 to 2/3/2022  -5/8/2023 PET showed interval decrease in size of RML mass with mild residual activity  CT chest done on 11/14/2024 reviewed-severe emphysema; RML nodule with adjacent atelectasis similar 27 x 19 mm; RUL spiculated nodule along minor fissure enlarged from 5 mm to 8 mm; a few scattered additional tiny nodules are not changed; no new nodules or areas of consolidation; bronchiectasis involving RML persists; enlarged mediastinal lymph nodes not significantly changed (12 mm in subcarinal region, 10mm in precarinal region); unchanged severe anterior wedging at T7 with interval vertebroplasty changes; saccular aneurysm of the distal descending thoracic aorta 5.4 cm increased from 4.0 cm   She saw CNP Cecily in 5/2024 for Rt lung cancer. RTC in 6 months    She Dr Gilbert in 11/24 -will let her cardiovascular doctor  (Dr Zack Rojas) know about the saccular aneurysm- She has an appt with Dr Winn 12/18/24     GERD and emesis:  Recommend she consider adding omeprazole 20 mg daily      LE edema:  No longer taking furosemide since potassium wasted   Monitor daily sodium intake  Elevate legs for 45 minutes in the afternoons   Elevate legs as much as possible during the day      Situational depression:    Her son (Oliver) passed on 6/27/2023 from cirrhosis at age 42.       She admits she has depression about her sons passing.    She is happy when she gets to see her grandchildren  She declines medications for treatment      Insomnia:  She stopped trazodone.     Constipation:   Explained systemic medications that are contributing to her constipation   She is to avoid straining with bowel movement   Continue Mg 200 mg daily.  She can add a stool softener when needed      Neurogenic claudication BLE/ PAD:   Aortogram 7/2021 with lower extremity angiography showed critical proximal right iliac, renal artery stenosis of about 90% or more with heavy calcifications, completely occluded left SFA throughout its length, and a focal mid right SFA stenosis of about 90% had iliac artery stenting via the right femoral approach without complication   MRI of L/S 11/2021 did not show stenosis   EMG studies 11/2021 were normal   She failed gabapentin   Continue cilostazol 50 mg daily  She restarted PT but on the 3 rd session she pulled a hamstring     She saw Dr Zack Haider  11/24 Will continue to manage medically      Spinal Stenosis: her back pain is chronic   She should wait on any spine surgery until pain is consistent and intolerable.   Xray L/S 8/2020 showed severe multilevel spondylosis worse at L4- 5 and mild anterolisthesis of L3 on L4 and retrolisthesis of L1 on L2   CT 5/2024 prominent compression fracture at T7 progressed since 3/21/2024 with mild paraspinal soft tissue swelling; there is also subtle interval erosion of  anterior inferior aspect of the T6 vertebral body    She will continue to follow with Dr Almanzar and Dr Porras   She has done PT and chiropractor in the past and declines to do more   Recommend she look into a girdle to help with posture and back pain   She is scheduled for kyphoplasty with spine fermín T6- 7 on 2024 with Dr Bowman    She saw Dr Bowman in       Vitamin D def: levels 15 on labs in 3/2024  Discontinue scripted Vitamin D due to concerns for bone loss with higher doses  Recommend she begin OTC Vitamin D3 2000 UT daily      Mammo in 2021 was normal and no longer needs to repeat   She is getting routine CT scans of the chest with oncology  Breast exam normal 3/24     BD in 2023 T-3.1. Explained she is osteoporosis stage.   Continue Fosamax 70 mg weekly and calcitonin NS to use daily.     Continue OTC Ca 600 mg BID, OTC Vitamin D3 2000 UT daily and eat 2 servings of calcium enriched foods daily.   Discussed importance of weight bearing exercises.       Colonoscopy with Dr Jain in  was normal.  She knows a couple of people who  during colonoscopy and she is afraid to repeat it. She declines Cologuard because her sister had false positive results.  She declines colonoscopy and Cologuard 2021     Ophth:    Seen in 2024 for corneal abrasion left eye. She had cataract extraction in 2018. She is not wearing glasses except for night driving. No glaucoma or MD.     She has a LW and MPOA.  Copy of her Advanced Directives scanned in her chart 3/2023        Hep C  -  FLU , 10/24   TDAP ,  with injury   Arexvy recommended and will consider   Prevnar   Pneumo 2011  Shingrix recommend and declines   SARS CVD vaccine declines   Disability placard given 2023 for 5 years      Some elements in the chart were copied from Dr. Warren's last office visit with patient.   Notes have been updated where appropriate, and reflect my current medical decision making from today.        RTC in 3/25 for MCR or sooner if needed   (MCR due 3/2025)     Scribe Attestation  By signing my name below, I, Jessica Blackman , Scribe   attest that this documentation has been prepared under the direction and in the presence of Sunshine Warren, MDPatient was identified as a fall risk. Risk prevention instructions provided

## 2024-12-11 NOTE — RESULT ENCOUNTER NOTE
Hi ...good news the CXR was normal and no pneumonia or other nodules in the lung and the heart is normal size

## 2024-12-12 ENCOUNTER — APPOINTMENT (OUTPATIENT)
Dept: CARDIAC SURGERY | Facility: CLINIC | Age: 79
End: 2024-12-12
Payer: MEDICARE

## 2024-12-12 ENCOUNTER — OFFICE VISIT (OUTPATIENT)
Dept: PRIMARY CARE | Facility: CLINIC | Age: 79
End: 2024-12-12
Payer: MEDICARE

## 2024-12-12 ENCOUNTER — TELEPHONE (OUTPATIENT)
Dept: PAIN MEDICINE | Facility: CLINIC | Age: 79
End: 2024-12-12

## 2024-12-12 VITALS
TEMPERATURE: 97.2 F | HEART RATE: 58 BPM | HEIGHT: 63 IN | BODY MASS INDEX: 19.84 KG/M2 | SYSTOLIC BLOOD PRESSURE: 134 MMHG | DIASTOLIC BLOOD PRESSURE: 73 MMHG | WEIGHT: 112 LBS | OXYGEN SATURATION: 96 %

## 2024-12-12 DIAGNOSIS — R00.1 JUNCTIONAL BRADYCARDIA: ICD-10-CM

## 2024-12-12 DIAGNOSIS — I48.0 PAROXYSMAL A-FIB (MULTI): ICD-10-CM

## 2024-12-12 DIAGNOSIS — C34.31 MALIGNANT NEOPLASM OF LOWER LOBE OF RIGHT LUNG (MULTI): ICD-10-CM

## 2024-12-12 DIAGNOSIS — N18.31 STAGE 3A CHRONIC KIDNEY DISEASE (MULTI): ICD-10-CM

## 2024-12-12 DIAGNOSIS — M47.816 LUMBAR SPONDYLOSIS: Primary | ICD-10-CM

## 2024-12-12 DIAGNOSIS — J44.1 COPD EXACERBATION (MULTI): Primary | ICD-10-CM

## 2024-12-12 DIAGNOSIS — J43.9 PULMONARY EMPHYSEMA, UNSPECIFIED EMPHYSEMA TYPE (MULTI): ICD-10-CM

## 2024-12-12 PROCEDURE — 1159F MED LIST DOCD IN RCRD: CPT | Performed by: INTERNAL MEDICINE

## 2024-12-12 PROCEDURE — 1157F ADVNC CARE PLAN IN RCRD: CPT | Performed by: INTERNAL MEDICINE

## 2024-12-12 PROCEDURE — 3078F DIAST BP <80 MM HG: CPT | Performed by: INTERNAL MEDICINE

## 2024-12-12 PROCEDURE — 99214 OFFICE O/P EST MOD 30 MIN: CPT | Performed by: INTERNAL MEDICINE

## 2024-12-12 PROCEDURE — 1123F ACP DISCUSS/DSCN MKR DOCD: CPT | Performed by: INTERNAL MEDICINE

## 2024-12-12 PROCEDURE — G2211 COMPLEX E/M VISIT ADD ON: HCPCS | Performed by: INTERNAL MEDICINE

## 2024-12-12 PROCEDURE — 1160F RVW MEDS BY RX/DR IN RCRD: CPT | Performed by: INTERNAL MEDICINE

## 2024-12-12 PROCEDURE — 4004F PT TOBACCO SCREEN RCVD TLK: CPT | Performed by: INTERNAL MEDICINE

## 2024-12-12 PROCEDURE — 3075F SYST BP GE 130 - 139MM HG: CPT | Performed by: INTERNAL MEDICINE

## 2024-12-12 RX ORDER — BENZONATATE 200 MG/1
200 CAPSULE ORAL 3 TIMES DAILY PRN
Qty: 42 CAPSULE | Refills: 0 | Status: SHIPPED | OUTPATIENT
Start: 2024-12-12 | End: 2025-01-11

## 2024-12-12 RX ORDER — AMOXICILLIN AND CLAVULANATE POTASSIUM 875; 125 MG/1; MG/1
875 TABLET, FILM COATED ORAL 2 TIMES DAILY
Qty: 20 TABLET | Refills: 0 | Status: SHIPPED | OUTPATIENT
Start: 2024-12-12 | End: 2024-12-22

## 2024-12-12 NOTE — TELEPHONE ENCOUNTER
The pt called in to say she had 90% relief for 8 hours. She felt very good and was able to complete more without discomfort.    show

## 2024-12-16 LAB
ATRIAL RATE: 70 BPM
PR INTERVAL: 152 MS
Q ONSET: 225 MS
QRS COUNT: 12 BEATS
QRS DURATION: 90 MS
QT INTERVAL: 448 MS
QTC CALCULATION(BAZETT): 483 MS
QTC FREDERICIA: 471 MS
R AXIS: -20 DEGREES
T AXIS: 53 DEGREES
T OFFSET: 449 MS
VENTRICULAR RATE: 70 BPM

## 2024-12-18 ENCOUNTER — OFFICE VISIT (OUTPATIENT)
Dept: VASCULAR SURGERY | Facility: CLINIC | Age: 79
End: 2024-12-18
Payer: MEDICARE

## 2024-12-18 VITALS
SYSTOLIC BLOOD PRESSURE: 106 MMHG | WEIGHT: 110 LBS | BODY MASS INDEX: 19.49 KG/M2 | HEIGHT: 63 IN | HEART RATE: 56 BPM | DIASTOLIC BLOOD PRESSURE: 60 MMHG

## 2024-12-18 DIAGNOSIS — I73.9 PAD (PERIPHERAL ARTERY DISEASE) (CMS-HCC): ICD-10-CM

## 2024-12-18 PROCEDURE — 3074F SYST BP LT 130 MM HG: CPT | Performed by: SURGERY

## 2024-12-18 PROCEDURE — 1160F RVW MEDS BY RX/DR IN RCRD: CPT | Performed by: SURGERY

## 2024-12-18 PROCEDURE — 1157F ADVNC CARE PLAN IN RCRD: CPT | Performed by: SURGERY

## 2024-12-18 PROCEDURE — 1159F MED LIST DOCD IN RCRD: CPT | Performed by: SURGERY

## 2024-12-18 PROCEDURE — 3078F DIAST BP <80 MM HG: CPT | Performed by: SURGERY

## 2024-12-18 PROCEDURE — 1123F ACP DISCUSS/DSCN MKR DOCD: CPT | Performed by: SURGERY

## 2024-12-18 PROCEDURE — 99214 OFFICE O/P EST MOD 30 MIN: CPT | Performed by: SURGERY

## 2024-12-18 PROCEDURE — 4004F PT TOBACCO SCREEN RCVD TLK: CPT | Performed by: SURGERY

## 2024-12-18 ASSESSMENT — ENCOUNTER SYMPTOMS
GASTROINTESTINAL NEGATIVE: 1
WEAKNESS: 0
PSYCHIATRIC NEGATIVE: 1
WOUND: 0
ENDOCRINE NEGATIVE: 1
HEADACHES: 0
EYES NEGATIVE: 1
CONSTITUTIONAL NEGATIVE: 1
BRUISES/BLEEDS EASILY: 0
BACK PAIN: 1
COLOR CHANGE: 0
SHORTNESS OF BREATH: 0
COUGH: 0
NUMBNESS: 0
DIZZINESS: 0
SPEECH DIFFICULTY: 0

## 2024-12-18 NOTE — PROGRESS NOTES
"History Of Present Illness  Edda Tee \"Carrie" is a 79 y.o. female presenting for PAD evaluation.  She has known history of PAD.  She has been seen in the vascular office before.  However her last office visit was in February 2022.  She reports some calf claudication, but mild and tolerable.  She denies any rest pain.  Her main question today is whether or not she can have an ablation surgery for her chronic back issues.  Her last MACKENZIE study from February 2023 reveals evidence of moderate disease with MACKENZIE of 0.72 on the right and 0.42 on the left.     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), Pain in unspecified ankle and joints of unspecified foot (08/29/2020), Personal history of other drug therapy, Personal history of other endocrine, nutritional and metabolic disease (08/18/2022), Personal history of other infectious and parasitic diseases (01/29/2018), Personal history of other specified conditions (09/04/2020), Radiculopathy, lumbar region (12/08/2021), Radiculopathy, lumbar region (07/02/2021), Tobacco abuse counseling (05/06/2022), Transient cerebral ischemic attack, unspecified, Unspecified mononeuropathy of unspecified lower limb (10/01/2021), and Urinary tract infection, site not specified (08/31/2020).    Surgical History  She has a past surgical history that includes Other surgical history (11/22/2022) and CT angio aorta and bilateral iliofemoral runoff including without contrast if performed (3/8/2023).     Social History  She reports that she has been smoking cigarettes. She has been exposed to tobacco smoke. She has never used smokeless tobacco. She reports that she does not drink alcohol and does not use drugs.    Family History  Family History   Problem Relation " Name Age of Onset    Hypertension Mother      Diabetes Mother      Stroke Father          Allergies  Gabapentin, Sulfa (sulfonamide antibiotics), Sulfanilamide (bulk), and Sulfasalazine    Review of Systems   Constitutional: Negative.    HENT: Negative.     Eyes: Negative.    Respiratory:  Negative for cough and shortness of breath.    Cardiovascular:  Negative for chest pain and leg swelling.   Gastrointestinal: Negative.    Endocrine: Negative.    Genitourinary: Negative.    Musculoskeletal:  Positive for back pain. Negative for gait problem.   Skin:  Negative for color change, pallor and wound.   Neurological:  Negative for dizziness, syncope, speech difficulty, weakness, numbness and headaches.   Hematological:  Does not bruise/bleed easily.   Psychiatric/Behavioral: Negative.          Physical Exam  Vitals reviewed.   Constitutional:       General: She is not in acute distress.     Appearance: Normal appearance. She is normal weight.   HENT:      Head: Normocephalic and atraumatic.   Eyes:      Extraocular Movements: Extraocular movements intact.      Conjunctiva/sclera: Conjunctivae normal.   Cardiovascular:      Rate and Rhythm: Normal rate and regular rhythm.      Pulses:           Femoral pulses are 2+ on the right side and 2+ on the left side.       Dorsalis pedis pulses are detected w/ Doppler on the right side and detected w/ Doppler on the left side.        Posterior tibial pulses are detected w/ Doppler on the right side and detected w/ Doppler on the left side.      Heart sounds: Normal heart sounds.   Pulmonary:      Effort: Pulmonary effort is normal.      Breath sounds: Normal breath sounds.   Abdominal:      Palpations: Abdomen is soft.   Musculoskeletal:         General: No swelling. Normal range of motion.      Cervical back: Normal range of motion.      Right lower leg: No edema.      Left lower leg: No edema.   Skin:     General: Skin is warm and dry.   Neurological:      General: No focal  "deficit present.      Mental Status: She is alert and oriented to person, place, and time.      Cranial Nerves: No cranial nerve deficit.      Sensory: No sensory deficit.      Motor: No weakness.   Psychiatric:         Mood and Affect: Mood normal.         Behavior: Behavior normal.          Last Recorded Vitals  Blood pressure 106/60, pulse 56, height 1.6 m (5' 3\"), weight 49.9 kg (110 lb).    Relevant Results      Current Outpatient Medications:     amiodarone (Pacerone) 200 mg tablet, TAKE ONE TABLET BY MOUTH EVERY DAY, Disp: 90 tablet, Rfl: 0    amoxicillin-pot clavulanate (Augmentin) 875-125 mg tablet, Take 1 tablet (875 mg) by mouth 2 times a day for 10 days., Disp: 20 tablet, Rfl: 0    apixaban (Eliquis) 2.5 mg tablet, Take 1 tablet (2.5 mg) by mouth 2 times a day., Disp: 180 tablet, Rfl: 3    benzonatate (Tessalon) 200 mg capsule, Take 1 capsule (200 mg) by mouth 3 times a day as needed for cough. Do not crush or chew., Disp: 42 capsule, Rfl: 0    calcitonin, salmon, (Miacalcin) 200 unit/actuation nasal spray, Administer 1 spray into one nostril once daily., Disp: 3.7 mL, Rfl: 11    carvedilol (Coreg) 6.25 mg tablet, Take 0.5 tablets (3.125 mg) by mouth 2 times a day with meals., Disp: 90 tablet, Rfl: 3    ergocalciferol (Vitamin D-2) 1.25 MG (61744 UT) capsule, Take 1 capsule (50,000 Units) by mouth 1 (one) time per week., Disp: 12 capsule, Rfl: 3    hydrOXYzine HCL (Atarax) 25 mg tablet, TAKE ONE TABLET BY MOUTH TWO TIMES A DAY, Disp: 90 tablet, Rfl: 0    MAGNESIUM GLUCONATE ORAL, Take 1 tablet by mouth once daily. 500 MG Oral Tablet, Disp: , Rfl:     vit C/E/Zn/coppr/lutein/zeaxan (PRESERVISION AREDS-2 ORAL), Take 1 capsule by mouth once daily., Disp: , Rfl:     lidocaine (Lidoderm) 5 % patch, Place 1 patch over 12 hours on the skin once daily. Apply to painful area 12 hours per day, remove for 12 hours. (Patient not taking: Reported on 12/18/2024), Disp: 30 patch, Rfl: 2    spironolactone (Aldactone) 25 " mg tablet, TAKE ONE-HALF TABLET BY MOUTH DAILY, Disp: 45 tablet, Rfl: 3    vitamin E 180 mg (400 unit) capsule, Take 1 capsule (400 Units) by mouth once daily. (Patient not taking: Reported on 12/18/2024), Disp: , Rfl:     Current Facility-Administered Medications:     ceFAZolin (Ancef) injection 1 g, 1 g, intramuscular, Once, Matthew Bowman MD          Assessment/Plan   Diagnoses and all orders for this visit:  PAD (peripheral artery disease) (CMS-[facility])  -     Referral to Vascular Surgery  -     Vascular US Ankle Brachial Index (MACKENZIE) Without Exercise; Future      80yo female with known history of PAD and previous interventions.  She does report claudication symptoms, however they are mild and tolerable at this time.  Her main concern currently is her chronic back pain issues.  I do not see any contraindication from a vascular standpoint to an ablation procedure for her back.  I have recommended that we repeat an MACKENZIE study at this time.  I will order has been placed.  I have also recommended that she continue with daily walking exercise and medical management.  She is to follow-up in 1 month to discuss results of the study and any further recommendations.       (This note was generated with voice recognition software and may contain errors including spelling, grammar, syntax and missed recognition of what was dictated, of which may not have been fully corrected)        Gabby Winn MD

## 2024-12-19 ENCOUNTER — APPOINTMENT (OUTPATIENT)
Dept: PAIN MEDICINE | Facility: CLINIC | Age: 79
End: 2024-12-19
Payer: MEDICARE

## 2024-12-31 DIAGNOSIS — Z95.810 ICD (IMPLANTABLE CARDIOVERTER-DEFIBRILLATOR) IN PLACE: ICD-10-CM

## 2024-12-31 DIAGNOSIS — I47.29 PAROXYSMAL VENTRICULAR TACHYCARDIA (MULTI): ICD-10-CM

## 2025-01-02 ENCOUNTER — DOCUMENTATION (OUTPATIENT)
Dept: PRIMARY CARE | Facility: CLINIC | Age: 80
End: 2025-01-02
Payer: MEDICARE

## 2025-01-02 ENCOUNTER — TELEPHONE (OUTPATIENT)
Dept: PRIMARY CARE | Facility: CLINIC | Age: 80
End: 2025-01-02
Payer: MEDICARE

## 2025-01-02 DIAGNOSIS — J01.91 ACUTE RECURRENT SINUSITIS, UNSPECIFIED LOCATION: Primary | ICD-10-CM

## 2025-01-02 RX ORDER — AMOXICILLIN AND CLAVULANATE POTASSIUM 875; 125 MG/1; MG/1
875 TABLET, FILM COATED ORAL 2 TIMES DAILY
Qty: 20 TABLET | Refills: 0 | Status: SHIPPED | OUTPATIENT
Start: 2025-01-02 | End: 2025-01-12

## 2025-01-02 NOTE — TELEPHONE ENCOUNTER
Talked with her and was good on the augmentin from 12/12 24 but than got the flu and now all the sinus pressure and pain and cough are back   Will call in another 10 day of the augmentin and warm fluids   Make a follow up appointment

## 2025-01-02 NOTE — TELEPHONE ENCOUNTER
Pt called- She requested to talk to [employer]. She would like to increase the dosage of one of her medications for bronchitis. Pt did not remember the name of her medication. She would like to speak to [employer] when available

## 2025-01-06 ENCOUNTER — APPOINTMENT (OUTPATIENT)
Dept: CARDIOLOGY | Facility: HOSPITAL | Age: 80
End: 2025-01-06
Payer: MEDICARE

## 2025-01-06 ENCOUNTER — HOSPITAL ENCOUNTER (OUTPATIENT)
Dept: CARDIOLOGY | Facility: CLINIC | Age: 80
Discharge: HOME | End: 2025-01-06
Payer: MEDICARE

## 2025-01-06 DIAGNOSIS — I47.29 PAROXYSMAL VENTRICULAR TACHYCARDIA (MULTI): ICD-10-CM

## 2025-01-06 DIAGNOSIS — Z95.810 ICD (IMPLANTABLE CARDIOVERTER-DEFIBRILLATOR) IN PLACE: ICD-10-CM

## 2025-01-13 ENCOUNTER — HOSPITAL ENCOUNTER (OUTPATIENT)
Dept: CARDIOLOGY | Facility: HOSPITAL | Age: 80
Discharge: HOME | End: 2025-01-13
Payer: MEDICARE

## 2025-01-13 DIAGNOSIS — I73.9 PAD (PERIPHERAL ARTERY DISEASE) (CMS-HCC): ICD-10-CM

## 2025-01-13 PROCEDURE — 93922 UPR/L XTREMITY ART 2 LEVELS: CPT

## 2025-01-13 PROCEDURE — 93922 UPR/L XTREMITY ART 2 LEVELS: CPT | Performed by: SURGERY

## 2025-01-28 ENCOUNTER — HOSPITAL ENCOUNTER (OUTPATIENT)
Dept: RADIOLOGY | Facility: CLINIC | Age: 80
Discharge: HOME | End: 2025-01-28
Payer: MEDICARE

## 2025-01-28 DIAGNOSIS — C34.31 MALIGNANT NEOPLASM OF LOWER LOBE OF RIGHT LUNG (MULTI): ICD-10-CM

## 2025-01-28 PROCEDURE — 71250 CT THORAX DX C-: CPT | Performed by: RADIOLOGY

## 2025-01-28 PROCEDURE — 71250 CT THORAX DX C-: CPT

## 2025-02-03 ENCOUNTER — TELEPHONE (OUTPATIENT)
Dept: RADIATION ONCOLOGY | Facility: HOSPITAL | Age: 80
End: 2025-02-03
Payer: MEDICARE

## 2025-02-03 NOTE — TELEPHONE ENCOUNTER
Called pt to remind of appointment on 2/4/2025 at 2:00. Pt's phone went to voicemail left number if needs to reschedule.      Pt is aware that the appointment is a phone/virtual visit, pt. understands that he/she doesn't have to show up in office.

## 2025-02-04 ENCOUNTER — APPOINTMENT (OUTPATIENT)
Dept: VASCULAR SURGERY | Facility: CLINIC | Age: 80
End: 2025-02-04
Payer: MEDICARE

## 2025-02-04 ENCOUNTER — HOSPITAL ENCOUNTER (OUTPATIENT)
Dept: RADIATION ONCOLOGY | Facility: HOSPITAL | Age: 80
Setting detail: RADIATION/ONCOLOGY SERIES
Discharge: HOME | End: 2025-02-04
Payer: MEDICARE

## 2025-02-04 DIAGNOSIS — C34.31 MALIGNANT NEOPLASM OF LOWER LOBE OF RIGHT LUNG (MULTI): Primary | ICD-10-CM

## 2025-02-04 PROCEDURE — 99214 OFFICE O/P EST MOD 30 MIN: CPT | Mod: 95 | Performed by: NURSE PRACTITIONER

## 2025-02-04 PROCEDURE — 99214 OFFICE O/P EST MOD 30 MIN: CPT | Performed by: NURSE PRACTITIONER

## 2025-02-04 PROCEDURE — G2211 COMPLEX E/M VISIT ADD ON: HCPCS | Performed by: NURSE PRACTITIONER

## 2025-02-04 ASSESSMENT — ENCOUNTER SYMPTOMS
COUGH: 1
PSYCHIATRIC NEGATIVE: 1
SPEECH DIFFICULTY: 0
CHEST TIGHTNESS: 0
CHOKING: 0
TREMORS: 0
BACK PAIN: 1
HEMATOLOGIC/LYMPHATIC NEGATIVE: 1
SHORTNESS OF BREATH: 1
FATIGUE: 0
APNEA: 0
GASTROINTESTINAL NEGATIVE: 1
LIGHT-HEADEDNESS: 0
CARDIOVASCULAR NEGATIVE: 1
HEADACHES: 0
ACTIVITY CHANGE: 0
FEVER: 0
SEIZURES: 0
FACIAL ASYMMETRY: 0
WEAKNESS: 0
DIZZINESS: 0
APPETITE CHANGE: 0
UNEXPECTED WEIGHT CHANGE: 0
NUMBNESS: 0
DIAPHORESIS: 0
CHILLS: 0

## 2025-02-04 NOTE — PROGRESS NOTES
"  Patient ID: [id number]      3.4 cm SCCA Rlung No clinically neg not interested in surgery [id number] Stage 1B     Technical Summary : Definitive SBRT using motion management  and image guidance     Treatment Area #1  Location : R lung  Dates : 1/30-2/3/22  Number of Treatments : 5  Dose : 50Gy  Modality : 6 FFF photons     Clinical Summary : Tolerated well    History of presenting illness    Telehealth visit with Edda Tee \"Shannan\" today. Patient is a 80 y.o. female who presents today for follow up 3 years s/p SBRT to the right lung with Dr. Lundy. Patient is doing ok. She was treated for Bronchitis a few weeks ago and still has a lingering cough, productive of clear phlegm. Continues to have pain around the rib cage intermittently.  Last visit she had two fractures. She has been following with pain management. She had one injection. Was not sure if it helped. States they discussed a nerve block. She needs to schedule an appointment.  Appetite is good. Weight stable. Breathing is baseline. Gets SOB with exertion. Denies  chest pain or fevers. No oxygen or inhaler requirements. Smokes 4-5 cigarettes a day. Down from 1ppd. No neurological symptoms. Follows with PCP, cardiologist, and vascular regularly. She has follow up with Dr. Gilbert scheduled in May.    Review of systems:  Review of Systems   Constitutional:  Negative for activity change, appetite change, chills, diaphoresis, fatigue, fever and unexpected weight change.   HENT: Negative.     Respiratory:  Positive for cough and shortness of breath (with exertion). Negative for apnea, choking and chest tightness.    Cardiovascular: Negative.    Gastrointestinal: Negative.    Musculoskeletal:  Positive for back pain.   Skin: Negative.    Neurological:  Negative for dizziness, tremors, seizures, syncope, facial asymmetry, speech difficulty, weakness, light-headedness, numbness and headaches.   Hematological: Negative.    Psychiatric/Behavioral: Negative.         Past " Medical history  She  has a past surgical history that includes Other surgical history (11/22/2022) and CT angio aorta and bilateral iliofemoral runoff including without contrast if performed (3/8/2023).    Radiology results:  CT chest wo IV contrast 01/28/2025    Narrative  Interpreted By:  Bill Lara,  STUDY:  CT CHEST WO IV CONTRAST;  1/28/2025 11:20 am    INDICATION:  Signs/Symptoms:history of lung cancer. S/p RT. growing RUL nodule on  CT from 11/14/24. Short interval scan to assess stability..    ,C34.31 Malignant neoplasm of lower lobe, right bronchus or lung    COMPARISON:  11/14/2024 unenhanced thoracic CT.    ACCESSION NUMBER(S):  [id number]    ORDERING CLINICIAN:  ANGELINA STRONG    TECHNIQUE:  Helical data acquisition of the chest was obtained without IV  contrast material.  Images were reformatted in axial, coronal, and  sagittal planes.    FINDINGS:  LUNGS AND AIRWAYS:    A 4 x 2 x 3 cm pleural-based medial right middle lobe soft tissue  density consistent with the history of lung cancer was 3.5 x 2 x 3 cm  previously. An 8 mm spiculated nodule along the superior minor  fissure (image 164 series 205 and sagittal image 26) is unchanged. A  pair of 3-4 mm posterior basal left lower lobe nodules on images  229-232 of series 205 are new. An extremely tiny central right lower  lobe nodule on image 190 series 205 is unchanged. Bandlike medial  middle lobe consolidation with air bronchograms. Marked emphysema  with centrilobular, panlobular and paraseptal components and  pulmonary hyperinflation. No pleural effusion.    MEDIASTINUM AND ADY, LOWER NECK AND AXILLA:  A 14 mm axial diameter and 32 mm tall subcarinal lymph node was 12 mm  axial diameter and 25 mm tall on the prior a few smaller mediastinal  nodes are without obvious change. The unenhanced nature of the study  limits evaluation of the ady.    The visualized thyroid gland is normal size and homogeneous.    Unremarkable  esophagus.    HEART AND VESSELS:  Sizable saccular aneurysm of the distal descending thoracic aorta  that measures at least 4.2 cm in AP diameter and roughly 5.9 cm in  width. Its size is difficult to precisely determine due to  tortuosity. Extensive vascular calcification.    Marked left main, LAD and right and mild circumflex coronary artery  calcification. The study is not optimized for evaluation of coronary  arteries.    The cardiac chambers are not enlarged. A left anterosuperior AICD has  right atrial and apical right ventricular leads.    No pericardial effusion.    UPPER ABDOMEN:  At least 3.6 cm wide infrarenal aortic aneurysm. The distal abdominal  aorta is not included. Bilateral renal cysts the largest of which is  a 2.6 cm left peripelvic cyst. A 6 mm left lateral renal hyperdense  lesion is likely to be a hemorrhagic cyst.    CHEST WALL AND OSSEOUS STRUCTURES:  There are no suspicious osseous lesions. Severe anterior wedge  compression and a vertebroplasty at the T7 vertebral body, unchanged.  Extensive spinal degenerative changes are marked at multiple levels.  Mild-to-moderate kyphoscoliosis. Left anteromedial subcutaneous loop  recorder.    Impression  1. A 4 x 2 x 3 cm pleural-based medial right middle lobe mass and a  mildly enlarged precarinal lymph node are minimally larger than in  November 2024.  2. Stable 8 mm spiculated minor fissure nodule.  3. New 3-4 mm left lower lobe nodules.  4. Marked emphysema.  5. Pronounced coronary artery calcification.  6. 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    MACRO:  None    Signed by: Bill Lara 1/30/2025 10:54 AM  Dictation workstation:   [id number]   Plan:  Assessment/Plan     79 year old female 3 years s/p SBRT to the right lung. Doing well from a radiation stand point. Breathing stable. CT of the chest done 1/30/25. Scan shows a 4 x 2 x 3 cm pleural-based medial right middle lobe mass and a mildly enlarged precarinal  lymph node are minimally larger than in November 2024. Stable 8 mm spiculated minor fissure nodule. New 3-4 mm left lower lobe nodules.  Marked emphysema. Pronounced coronary artery calcification.  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 Continue follow up with pain management, vascular, PCP and cardiology as scheduled. Continue follow up with Dr. Gilbert as scheduled.  Given multiple new 3-4 mm nodules will order short interval scan to assess stability of nodule. If continued growth will order PET CT. Patient to return to clinic in 3 months with CT of the chest prior unless needs to be seen sooner. Instructed to call with any questions or concerns.     I performed this visit using real- time telehealth tools , including an audio/video or telephone connection between Edda Tee , who is in their home and Aracelis Tony CNP at [facility].

## 2025-02-11 DIAGNOSIS — L29.9 ITCHING: ICD-10-CM

## 2025-02-11 RX ORDER — HYDROXYZINE HYDROCHLORIDE 25 MG/1
25 TABLET, FILM COATED ORAL 2 TIMES DAILY
Qty: 90 TABLET | Refills: 0 | Status: SHIPPED | OUTPATIENT
Start: 2025-02-11

## 2025-02-12 ENCOUNTER — HOSPITAL ENCOUNTER (OUTPATIENT)
Dept: CARDIOLOGY | Facility: CLINIC | Age: 80
Discharge: HOME | End: 2025-02-12
Payer: MEDICARE

## 2025-02-12 DIAGNOSIS — I47.29 PAROXYSMAL VENTRICULAR TACHYCARDIA (MULTI): ICD-10-CM

## 2025-02-12 DIAGNOSIS — Z95.810 ICD (IMPLANTABLE CARDIOVERTER-DEFIBRILLATOR) IN PLACE: ICD-10-CM

## 2025-02-12 PROCEDURE — 93296 REM INTERROG EVL PM/IDS: CPT

## 2025-03-04 ENCOUNTER — OFFICE VISIT (OUTPATIENT)
Dept: CARDIOLOGY | Facility: CLINIC | Age: 80
End: 2025-03-04
Payer: MEDICARE

## 2025-03-04 VITALS
DIASTOLIC BLOOD PRESSURE: 86 MMHG | OXYGEN SATURATION: 94 % | WEIGHT: 107 LBS | HEART RATE: 70 BPM | BODY MASS INDEX: 18.96 KG/M2 | SYSTOLIC BLOOD PRESSURE: 138 MMHG | HEIGHT: 63 IN

## 2025-03-04 DIAGNOSIS — I48.0 PAROXYSMAL A-FIB (MULTI): ICD-10-CM

## 2025-03-04 DIAGNOSIS — I50.22 CHRONIC SYSTOLIC CONGESTIVE HEART FAILURE: ICD-10-CM

## 2025-03-04 DIAGNOSIS — E78.5 DYSLIPIDEMIA: ICD-10-CM

## 2025-03-04 DIAGNOSIS — I48.91 ATRIAL FIBRILLATION WITH RVR (MULTI): ICD-10-CM

## 2025-03-04 DIAGNOSIS — Z95.810 ICD (IMPLANTABLE CARDIOVERTER-DEFIBRILLATOR) IN PLACE: ICD-10-CM

## 2025-03-04 DIAGNOSIS — I73.9 PAD (PERIPHERAL ARTERY DISEASE) (CMS-HCC): Primary | ICD-10-CM

## 2025-03-04 PROCEDURE — 1157F ADVNC CARE PLAN IN RCRD: CPT | Performed by: STUDENT IN AN ORGANIZED HEALTH CARE EDUCATION/TRAINING PROGRAM

## 2025-03-04 PROCEDURE — 3079F DIAST BP 80-89 MM HG: CPT | Performed by: STUDENT IN AN ORGANIZED HEALTH CARE EDUCATION/TRAINING PROGRAM

## 2025-03-04 PROCEDURE — 1123F ACP DISCUSS/DSCN MKR DOCD: CPT | Performed by: STUDENT IN AN ORGANIZED HEALTH CARE EDUCATION/TRAINING PROGRAM

## 2025-03-04 PROCEDURE — 99214 OFFICE O/P EST MOD 30 MIN: CPT | Performed by: STUDENT IN AN ORGANIZED HEALTH CARE EDUCATION/TRAINING PROGRAM

## 2025-03-04 PROCEDURE — G2211 COMPLEX E/M VISIT ADD ON: HCPCS | Performed by: STUDENT IN AN ORGANIZED HEALTH CARE EDUCATION/TRAINING PROGRAM

## 2025-03-04 PROCEDURE — 3075F SYST BP GE 130 - 139MM HG: CPT | Performed by: STUDENT IN AN ORGANIZED HEALTH CARE EDUCATION/TRAINING PROGRAM

## 2025-03-04 PROCEDURE — 1159F MED LIST DOCD IN RCRD: CPT | Performed by: STUDENT IN AN ORGANIZED HEALTH CARE EDUCATION/TRAINING PROGRAM

## 2025-03-04 RX ORDER — CARVEDILOL 3.12 MG/1
3.12 TABLET ORAL
Qty: 180 TABLET | Refills: 0 | Status: SHIPPED | OUTPATIENT
Start: 2025-03-04

## 2025-03-04 NOTE — PROGRESS NOTES
Chief complaint:   Chief Complaint   Patient presents with    Follow-up        History of Present Illness  Edda Tee is a 80 y.o. year old female with history of lung cancer, atrial fibrillation on amiodarone and Eliquis, nonischemic cardiomyopathy HFrEF LVEF 25%, status post ICD, moderate coronary artery disease, COPD, PAD status post prior iliac and SFA stents who is presenting for cardiovascular follow-up    Patient reports to be overall doing well.  She denies chest pain, no shortness of breath, no palpitations, or syncope. Does have some mild dizziness  She reports cramps in feet when standing or when walking. Denies pain at her calves or thighs. Overall reports symptoms are stable/unchanged compared to prior. No rest pain.     Social History     Tobacco Use    Smoking status: Some Days     Current packs/day: 0.00     Types: Cigarettes     Last attempt to quit:      Years since quittin.1     Passive exposure: Past    Smokeless tobacco: Never   Vaping Use    Vaping status: Never Used   Substance Use Topics    Alcohol use: Never    Drug use: Never       Outpatient Medications:  Current Outpatient Medications   Medication Instructions    amiodarone (Pacerone) 200 mg tablet TAKE ONE TABLET BY MOUTH EVERY DAY    apixaban (ELIQUIS) 2.5 mg, oral, 2 times daily    calcitonin, salmon, (Miacalcin) 200 unit/actuation nasal spray 1 spray, One Nostril, Daily    carvedilol (COREG) 3.125 mg, oral, 2 times daily (morning and late afternoon)    ergocalciferol (VITAMIN D-2) 50,000 Units, oral, Once Weekly    hydrOXYzine HCL (ATARAX) 25 mg, oral, 2 times daily    lidocaine (Lidoderm) 5 % patch 1 patch, transdermal, Daily, Apply to painful area 12 hours per day, remove for 12 hours.    MAGNESIUM GLUCONATE ORAL 1 tablet, Daily    spironolactone (ALDACTONE) 12.5 mg, oral, Daily    vit C/E/Zn/coppr/lutein/zeaxan (PRESERVISION AREDS-2 ORAL) 1 capsule, Daily    vitamin E 400 Units, Daily         Vitals:  Vitals:     03/04/25 1200   BP: 138/86   Pulse: 70   SpO2: 94%       Physical Exam:  General: NAD, well-appearing, elderly  HEENT: moist mucous membranes, no jaundice  Neck: No JVD, no carotid bruit  Lungs: CTA kathryn, no wheezing or rales  Cardiac: RRR, no murmurs  Abdomen: soft, non-tender, non-distended  Extremities: 2+ radial pulses, no edema, no wounds, 1+ palpable pedal pulses  Skin: warm, dry  Neurologic: AAOx3,  no focal deficits      Assessment/Plan       # Nonischemic cardiomyopathy  -Previously assessed as LVEF 20 to 25%, likely related to tachycardia induced CM  -Most recent echo with recovered LVEF to 60-65%  -Continue GDMT with carvedilol 3.125 mg twice daily and Aldactone    # Coronary artery disease  -Prior left heart cath with moderate CAD in the RCA, to be managed medically  -Patient is asymptomatic.  Patient is currently on Eliquis, not on antiplatelet  -Does not want to be on statin therapy    # Atrial fibrillation  -On reduced dose Eliquis 2.5 mg twice daily and amiodarone 200 mg daily  -Follows with EP    # Peripheral artery disease  -With history of prior lower extremity interventions.  She reports pain in her feet, but denies thigh or calf claudication symptoms  -Most recent MACKENZIE/TBI reviewed with evidence of  disease bilaterally, overall unchanged from prior. Continue to manage medically in the absence of foot wounds or rest pain.  Discussed with patient importance of regular footcare      Rafaela Dixon MD [facility]  Interventional Cardiology  Endovascular Interventions  [email or web address]    **Disclaimer: This note was dictated by speech recognition, and every effort has been made to prevent any error in transcription, however minor errors may be present**

## 2025-03-05 ENCOUNTER — APPOINTMENT (OUTPATIENT)
Dept: VASCULAR SURGERY | Facility: CLINIC | Age: 80
End: 2025-03-05
Payer: MEDICARE

## 2025-03-17 ENCOUNTER — APPOINTMENT (OUTPATIENT)
Dept: PRIMARY CARE | Facility: CLINIC | Age: 80
End: 2025-03-17
Payer: MEDICARE

## 2025-03-19 NOTE — PROGRESS NOTES
Subjective   Reason for Visit: Edda Tee is an 80 y.o. female here for her Subsequent Medicare Assessment and follow up             She is taking scripted Vitamin D weekly     Her hearing is awful but she declines hearing aids for now     She continues to smoke   When she tries to quit on her own she gets insomnia     She has had mild BLE edema around the ankles       HEALTH:  PAP no longer needs to repeat   Mammo 1/08, 2016, 5/19, 8/20, 8/21, no longer needs to repeat   BD 4/16 T-1.9, 5/19 T-2.1, 8/21 T-2.5, 8/23 T-3.1.   Colon 6/08 adenoma, 5/12 and declines repeat. Declines Cologuard   ECHO 9/17, 5/2021, 3/23   Carotids 9/17 <50%    EKG 8/22, 4/23, 3/24, 5/24, 11/24 ED     Urine 2017, 1/19   Hep C 8/18 -  FLU 1/23, 10/24   TDAP 2013, 6/23 with injury   Arexvy discussed   Prevnar 5/17  Pneumo 1/2011  Shingrix recommend and declines   SARS CVD vaccine declines   Ophth Seen in 2/2024 for corneal abrasion left eye. She had cataract extraction in 2018. She is not wearing glasses except for night driving. No glaucoma or MD.  Copy of her Advanced Directives scanned in her chart 3/2023      Patient Care Team:  Sunshine Warren MD as PCP - General  Sunshine Warren MD as PCP - O Medicare Advantage PCP  Mango Gilbert MD as Consulting Physician (Hematology and Oncology)     Review of Systems  All systems negative except those listed in the HPI      Past Medical, Surgical, and Family History reviewed and updated in chart.  Reviewed all medications by prescribing practitioner or clinical pharmacist   (such as prescriptions, OTCs, herbal therapies and supplements) and documented in the medical record       Objective   Vitals:  Visit Vitals  /60 (BP Location: Left arm, Patient Position: Sitting, BP Cuff Size: Adult)   Pulse 62   Temp 36.4 °C (97.6 °F) (Temporal)    Body mass index is 18.95 kg/m².      Physical Exam  Vitals reviewed.   Constitutional:       Appearance: Normal appearance.      Comments: Low  weight    HENT:      Head: Normocephalic.      Right Ear: Tympanic membrane, ear canal and external ear normal.      Left Ear: Tympanic membrane, ear canal and external ear normal.      Nose: Nose normal.      Mouth/Throat:      Pharynx: Oropharynx is clear.   Eyes:      Conjunctiva/sclera: Conjunctivae normal.   Cardiovascular:      Rate and Rhythm: Normal rate and regular rhythm.      Pulses: Normal pulses.      Heart sounds: Normal heart sounds.      Comments: Regular, she is not in a- fib  Pulmonary:      Effort: Pulmonary effort is normal.      Breath sounds: Normal breath sounds.   Abdominal:      General: Bowel sounds are normal.      Palpations: Abdomen is soft.      Comments: Mild epigastric tenderness, no masses and no rebound    Musculoskeletal:         General: Normal range of motion.      Cervical back: Normal range of motion and neck supple.   Skin:     General: Skin is warm.   Neurological:      General: No focal deficit present.      Mental Status: She is alert and oriented to person, place, and time.   Psychiatric:         Mood and Affect: Mood normal.         Behavior: Behavior normal.         Thought Content: Thought content normal.         Judgment: Judgment normal.       Assessment & Plan  Lipid screening         Dyslipidemia    Orders:    CBC; Future    Comprehensive Metabolic Panel; Future    Lipid Panel; Future    TSH with reflex to Free T4 if abnormal; Future    Chronic systolic congestive heart failure    Orders:    B-type natriuretic peptide; Future    Routine general medical examination at health care facility    Orders:    1 Year Follow Up In [facility] - PCP - Wellness Exam; Future    Paroxysmal A-fib (Multi)         Stage 3b chronic kidney disease (Multi)         Malignant neoplasm of unspecified part of right bronchus or lung (Multi)         PAD (peripheral artery disease) (CMS-HCC)         Exertional shortness of breath         Vascular myelopathy (Multi)         COPD  exacerbation (Multi)    Orders:    Referral to Clinical Pharmacy; Future    Pulmonary emphysema, unspecified emphysema type (Multi)    Orders:    Referral to Clinical Pharmacy; Future    Compression fracture of T8 vertebra with routine healing, subsequent encounter    Orders:    ergocalciferol (Vitamin D-2) 1250 mcg (50,000 units) capsule; Take 1 capsule (50,000 Units) by mouth 1 (one) time per week.    Vitamin D deficiency    Orders:    ergocalciferol (Vitamin D-2) 1250 mcg (50,000 units) capsule; Take 1 capsule (50,000 Units) by mouth 1 (one) time per week.    Vitamin D 25-Hydroxy,Total (for eval of Vitamin D levels); Future    Malignant neoplasm of lower lobe of right lung (Multi)         Transient ischemic attack         Asymptomatic menopausal state    Orders:    XR DEXA bone density; Future    Age-related osteoporosis without current pathological fracture    Orders:    XR DEXA bone density; Future    Tobacco use            Problem List Items Addressed This Visit       Chronic systolic congestive heart failure       Orders:    B-type natriuretic peptide; Future           Relevant Orders    B-type natriuretic peptide    COPD (chronic obstructive pulmonary disease) with emphysema (Multi)    Dyslipidemia       Orders:    CBC; Future    Comprehensive Metabolic Panel; Future    Lipid Panel; Future    TSH with reflex to Free T4 if abnormal; Future           Relevant Orders    CBC    Comprehensive Metabolic Panel    Lipid Panel    TSH with reflex to Free T4 if abnormal    Exertional shortness of breath    Lung cancer (Multi)    PAD (peripheral artery disease) (CMS-HCC)    Paroxysmal A-fib (Multi)    Stage 3b chronic kidney disease (Multi)    Transient ischemic attack    Vascular myelopathy (Multi)     Other Visit Diagnoses       Routine general medical examination at health care facility    -  Primary    Relevant Orders    1 Year Follow Up In [facility] - PCP - Wellness Exam    Malignant neoplasm of  unspecified part of right bronchus or lung (Multi)        Lipid screening        COPD exacerbation (Multi)        Compression fracture of T8 vertebra with routine healing, subsequent encounter        Relevant Medications    ergocalciferol (Vitamin D-2) 1250 mcg (50,000 units) capsule (Start on 3/23/2025)    Vitamin D deficiency        Relevant Medications    ergocalciferol (Vitamin D-2) 1250 mcg (50,000 units) capsule (Start on 3/23/2025)    Other Relevant Orders    Vitamin D 25-Hydroxy,Total (for eval of Vitamin D levels)           Subsequent Medicare Assessment and follow up completed   Labs ordered      Medicare Wellness completed  -  Discussed healthy diet and regular exercise.    -  Physical exam overall unremarkable. Immunizations reviewed and updated accordingly. Healthy lifestyle choices discussed (tobacco avoidance, appropriate alcohol use, avoidance of illicit substances).   -  Patient is wearing seatbelt.   -  Screening lab work ordered as indicated.    -  Age appropriate screening tests reviewed with patient.        We spent time discussing depression screen and there is nothing found that is of concern for underling depression. The PQH form was filled and the meds reviewed. She is grieving the passing of her son in 6/2023.      We discussed issues with alcohol and she is currently drinking a rare glass of wine . I see no concerns about overuse.      She does not have grab bars in the shower. Recommend she install grab bars immediately 3/25  She has not fallen recently and no risk of falls in the house   She has good lighting around the house and functioning smoke detectors.      She is  with 2 sons. Her son (Oliver) passed 6/23 from cirrhosis at age 42.      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 When she tries to quit on her own she gets insomnia        Her son did laser treatment for smoking.        Nicotine dependence I spent more than 3 minutes counseling patient about the        need for smoking cessation and how I can support efforts when patient is ready to       quit. Discussed nicotine replacement therapy, Varenicline, Buproprion, hypnosis,        support groups and acupuncture as potential options. Patient currently has no signs        or symptoms of tobacco related disease.       Ready to quit: No       Counseling given: Yes  3/25        Recommend 1-800-QUIT-NOW to assist with smoking cessation.       Recommend making a strategic plan to quit smoking- explained this to the patient        in detail. Explained the proper way to use Nicotine gum and lozenges.        Warned of weight gain with quitting smoking- do not grab candy/ sugary foods to        keep from smoking.       Her hearing is not as good as it used to be   She declines appt with audiology for now 1/2023      Her weight/ BMI is in low normal range in office, recommend she maintain  Her weight is 107 and BMI 18.95 IO 3/25      HTN: BP normal.    Continue hydroxyzine 25 mg BID    Recommend when she stand she count to 10 prior to taking her first steps. Recommend increased sodium in her diet   EKG 5/24 a- fib with RVR    ECHO 5/2021 was normal but poor functional capacity   Stress test normal 5/2021  Recommend she monitor her BP at home and call with elevated readings.    She saw Dr Edward in 11/24      Long standing atrial fib with prior TIA in 9/2017: On exam: she is not in a- fib 3/25  Continue carvedilol 3.125 mg BID and amiodarone 200 mg daily  Continue Eliquis 2.5 mg BID and hydroxyzine 25 mg BID  She was on Pradaxa then Eliquis since 9/17 after she was admitted with TIA 9/17    - EP did an implantable loop on 11/18/2022     - S/p DC shock and dual chamber ICD  interrogation and reporgramming on 4/20/2023 with Dr Perez    Carotid duplex: <50% stenosis bilateral vertebra arteries patent with antegrade flow  She saw Dr Edward in 11/24       Acute respiratory failure and cardiac tamponade.   Admitted on 3/30/2023 and discharged on 4/4/2023  She presented on 3/30/2023 for elective outpatient ICD placement which was complicated by cardiac tamponade and acute hypoxic respiratory failure.    - S/p percutaneous pericardiocentesis on 3/30/2023.   Approximately 400 to 500 cc of blood removed from the pericardial sac.  CT 11/24 A 4 x 2 x 3 cm pleural-based medial right middle lobe mass and a  mildly enlarged precarinal lymph node are minimally larger than in November 2024.  Stable 8 mm spiculated minor fissure nodule. 3. New 3-4 mm left lower lobe nodules.  Marked emphysema. Pronounced coronary artery calcification. 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  She saw Dr Edward in 11/24     She saw Dr Zack Das in 3/25     Middle cerebral aneurysm right side :  She needs CT angio and has to schedule since 2 mm only      I have spent 15 min face to face with this patient discussing their cardiac risk   We discussed the patients cardiovascular risk. If needed, lifestyle modifications recommended including: behavioral therapies of nutrition choices, exercise and eliminate habits that are contributing to their cardiac risk. We agreed to a plan to decrease his cardiovascular risks. Discussed ASA. Reviewed Guidelines and approved recommendations made to patient.   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   Dr Pina did EBUS on 12/19/2022 showed Interval increase in size of right middle lobe mass when compared with the previous CT from 08/23/2021, concerning for neoplastic etiology. Interval increase in size of multiple mediastinal lymph nodes as detailed above, concerning for metastatic lymphadenopathy. Moderate upper lobe predominant emphysematous changes.   Pathology results 12/2022 showed malignant cells derived from SCC   -treated with SBRT (5 treatments) from 1/30 to 2/3/2022  -5/8/2023 PET interval decrease in size of RML mass with mild residual activity  CT 11/24 A 4 x 2 x 3 cm pleural-based medial right middle lobe mass and a  mildly enlarged precarinal lymph node are minimally larger than in November 2024.  Stable 8 mm spiculated minor fissure nodule. 3. New 3-4 mm left lower lobe nodules.  Marked emphysema. Pronounced coronary artery calcification. 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   She saw [facility] for Rt lung cancer. RTC in 6 months    She Dr Vladimir in 11/24 -will let her cardiovascular doctor (Dr Zack Rojas) know about the saccular aneurysm- She has an appt with Dr Winn 12/18/24     GERD and emesis:  Recommend she consider adding omeprazole 20 mg daily      LE edema: She has had mild BLE edema around the ankles   No longer taking furosemide since potassium wasted   Monitor daily sodium intake  Elevate legs for 45 minutes in the afternoons   Elevate legs as much as possible during the day      Situational depression:    Her son (Oliver) passed on 6/27/2023  from cirrhosis at age 42.       She admits she has depression about her sons passing.    She is happy when she gets to see her grandchildren  She declines medications for treatment      Insomnia:  She stopped trazodone.     Constipation:   Explained systemic medications that are contributing to her constipation   She is to avoid straining with bowel movement   Continue Mg 200 mg daily.  She can add a stool softener when needed      Neurogenic claudication BLE/ PAD:   Continue cilostazol 50 mg daily   She failed gabapentin    Aortogram 7/2021 with lower extremity angiography showed critical proximal right iliac, renal artery stenosis of about 90% or more with heavy calcifications, completely occluded left SFA throughout its length, and a focal mid right SFA stenosis of about 90% had iliac artery stenting via the right femoral approach without complication   MRI of L/S 11/2021 did not show stenosis   EMG studies 11/2021 were normal   She restarted PT but on the 3 rd session she pulled a hamstring     She saw Dr Zack Haider  11/24 Will continue to manage medically     Spinal Stenosis: her back pain is chronic: She is going to schedule an ablation with Dr Bowman   Seen in the ED on 3/21/2024 for T 7 compression fracture    Continue Fosamax 70 mg weekly and calcitonin NS daily.    Xray L/S 5/24 Advanced lumbar degenerative changes/scoliosis similar to previous   CT 5/2024 prominent compression fracture at T7 progressed since 3/21/2024 with mild paraspinal soft tissue swelling; there is also subtle interval erosion of anterior inferior aspect of the T6 vertebral body    She will continue to follow with Dr Almanzar and Dr Porras   She has done PT and chiropractor in the past and declines to do more   -s/p kyphoplasty with spine fermín T6- 7 on 7/24/2024 with Dr Bowman     She aw Dr Bowman in 9/24        Vitamin D def: Labs ordered and we will adjust if indicated  3/25  Continue scripted Vitamin D 50K UT weekly.  Refilled 3/25      Mammo in 2021 was normal and no longer needs to repeat   She is getting routine CT scans of the chest with oncology  Breast exam normal 3/25     BD in 2023 T-3.1. Continue calcitonin NS to use daily.     She is no longer taking Fosamax   Continue OTC Ca 600 mg BID, scripted Vitamin D weekly and eat 2 servings of calcium enriched foods daily.   Discussed importance of weight bearing exercises.       Colonoscopy with Dr Jain in  was normal.  She knows a couple of people who  during colonoscopy and she is afraid to repeat it. She declines Cologuard because her sister had false positive results.  She declines colonoscopy and Cologuard 2021     Ophth:    Seen in 2024 for corneal abrasion left eye. She had cataract extraction in . She is not wearing glasses except for night driving. No glaucoma or MD.     I spent 15 min with the patient discussing their wishes for end of life choices.   We discussed the need for a Living Will and that wishes should be discussed with Family. The DNR status was reviewed, and we discussed the options of this and, the DNR _CC options as well.   We also went over how important it was to have these choices written down and clear for any surviving family so that their wishes are followed   The patient and I came to to following agreement :   She has a LW and MPOA.  Copy of her Advanced Directives scanned in her chart 3/2023        Hep C  -  FLU , 10/24   TDAP ,  with injury   Arexvy discussed   Prevnar   Pneumo 2011  Shingrix recommend and declines   SARS CVD vaccine declines   Disability placard given 2023 for 5 years      Some elements in the chart were copied from Dr. Warren's last office visit with patient.   Notes have been updated where appropriate, and reflect my current medical decision making from today.       RTC in 6 months for follow up or sooner if needed   (MCR due 3/26, last mcr 3/20/25)     Scribe Attestation  By  signing my name below, I, Jessica Blackman , Bernardoiblawanda   attest that this documentation has been prepared under the direction and in the presence of Sunshine Warren, MDPatient was identified as a fall risk. Risk prevention instructions provided

## 2025-03-20 ENCOUNTER — OFFICE VISIT (OUTPATIENT)
Dept: PRIMARY CARE | Facility: CLINIC | Age: 80
End: 2025-03-20
Payer: MEDICARE

## 2025-03-20 VITALS
HEIGHT: 63 IN | HEART RATE: 62 BPM | OXYGEN SATURATION: 99 % | TEMPERATURE: 97.6 F | SYSTOLIC BLOOD PRESSURE: 112 MMHG | BODY MASS INDEX: 18.96 KG/M2 | DIASTOLIC BLOOD PRESSURE: 60 MMHG | WEIGHT: 107 LBS

## 2025-03-20 DIAGNOSIS — E78.5 DYSLIPIDEMIA: ICD-10-CM

## 2025-03-20 DIAGNOSIS — I48.0 PAROXYSMAL A-FIB (MULTI): ICD-10-CM

## 2025-03-20 DIAGNOSIS — I50.22 CHRONIC SYSTOLIC CONGESTIVE HEART FAILURE: ICD-10-CM

## 2025-03-20 DIAGNOSIS — I73.9 PAD (PERIPHERAL ARTERY DISEASE) (CMS-HCC): ICD-10-CM

## 2025-03-20 DIAGNOSIS — E55.9 VITAMIN D DEFICIENCY: ICD-10-CM

## 2025-03-20 DIAGNOSIS — S22.060D COMPRESSION FRACTURE OF T8 VERTEBRA WITH ROUTINE HEALING, SUBSEQUENT ENCOUNTER: ICD-10-CM

## 2025-03-20 DIAGNOSIS — C34.31 MALIGNANT NEOPLASM OF LOWER LOBE OF RIGHT LUNG (MULTI): ICD-10-CM

## 2025-03-20 DIAGNOSIS — J43.9 PULMONARY EMPHYSEMA, UNSPECIFIED EMPHYSEMA TYPE (MULTI): ICD-10-CM

## 2025-03-20 DIAGNOSIS — G45.9 TRANSIENT ISCHEMIC ATTACK: ICD-10-CM

## 2025-03-20 DIAGNOSIS — N18.32 STAGE 3B CHRONIC KIDNEY DISEASE (MULTI): ICD-10-CM

## 2025-03-20 DIAGNOSIS — G95.19: ICD-10-CM

## 2025-03-20 DIAGNOSIS — C34.91 MALIGNANT NEOPLASM OF UNSPECIFIED PART OF RIGHT BRONCHUS OR LUNG (MULTI): ICD-10-CM

## 2025-03-20 DIAGNOSIS — Z00.00 ROUTINE GENERAL MEDICAL EXAMINATION AT HEALTH CARE FACILITY: Primary | ICD-10-CM

## 2025-03-20 DIAGNOSIS — Z78.0 ASYMPTOMATIC MENOPAUSAL STATE: ICD-10-CM

## 2025-03-20 DIAGNOSIS — M81.0 AGE-RELATED OSTEOPOROSIS WITHOUT CURRENT PATHOLOGICAL FRACTURE: ICD-10-CM

## 2025-03-20 DIAGNOSIS — R06.02 EXERTIONAL SHORTNESS OF BREATH: ICD-10-CM

## 2025-03-20 DIAGNOSIS — Z72.0 TOBACCO USE: ICD-10-CM

## 2025-03-20 DIAGNOSIS — J44.1 COPD EXACERBATION (MULTI): ICD-10-CM

## 2025-03-20 DIAGNOSIS — Z13.220 LIPID SCREENING: ICD-10-CM

## 2025-03-20 PROBLEM — J96.22 ACUTE AND CHRONIC RESPIRATORY FAILURE WITH HYPERCAPNIA: Status: RESOLVED | Noted: 2024-03-11 | Resolved: 2025-03-20

## 2025-03-20 PROBLEM — J96.22 ACUTE AND CHRONIC RESPIRATORY FAILURE WITH HYPERCAPNIA: Status: ACTIVE | Noted: 2024-03-11

## 2025-03-20 PROBLEM — L97.521 NON-PRESSURE CHRONIC ULCER OF OTHER PART OF LEFT FOOT LIMITED TO BREAKDOWN OF SKIN: Status: RESOLVED | Noted: 2024-03-11 | Resolved: 2025-03-20

## 2025-03-20 PROCEDURE — 1157F ADVNC CARE PLAN IN RCRD: CPT | Performed by: INTERNAL MEDICINE

## 2025-03-20 PROCEDURE — 3078F DIAST BP <80 MM HG: CPT | Performed by: INTERNAL MEDICINE

## 2025-03-20 PROCEDURE — 99406 BEHAV CHNG SMOKING 3-10 MIN: CPT | Performed by: INTERNAL MEDICINE

## 2025-03-20 PROCEDURE — 1158F ADVNC CARE PLAN TLK DOCD: CPT | Performed by: INTERNAL MEDICINE

## 2025-03-20 PROCEDURE — 1160F RVW MEDS BY RX/DR IN RCRD: CPT | Performed by: INTERNAL MEDICINE

## 2025-03-20 PROCEDURE — 1159F MED LIST DOCD IN RCRD: CPT | Performed by: INTERNAL MEDICINE

## 2025-03-20 PROCEDURE — G0444 DEPRESSION SCREEN ANNUAL: HCPCS | Performed by: INTERNAL MEDICINE

## 2025-03-20 PROCEDURE — 99214 OFFICE O/P EST MOD 30 MIN: CPT | Performed by: INTERNAL MEDICINE

## 2025-03-20 PROCEDURE — 4004F PT TOBACCO SCREEN RCVD TLK: CPT | Performed by: INTERNAL MEDICINE

## 2025-03-20 PROCEDURE — 1170F FXNL STATUS ASSESSED: CPT | Performed by: INTERNAL MEDICINE

## 2025-03-20 PROCEDURE — 3074F SYST BP LT 130 MM HG: CPT | Performed by: INTERNAL MEDICINE

## 2025-03-20 PROCEDURE — G0439 PPPS, SUBSEQ VISIT: HCPCS | Performed by: INTERNAL MEDICINE

## 2025-03-20 PROCEDURE — 1123F ACP DISCUSS/DSCN MKR DOCD: CPT | Performed by: INTERNAL MEDICINE

## 2025-03-20 PROCEDURE — G0446 INTENS BEHAVE THER CARDIO DX: HCPCS | Performed by: INTERNAL MEDICINE

## 2025-03-20 PROCEDURE — 99397 PER PM REEVAL EST PAT 65+ YR: CPT | Performed by: INTERNAL MEDICINE

## 2025-03-20 RX ORDER — ERGOCALCIFEROL 1.25 MG/1
50000 CAPSULE ORAL
Qty: 12 CAPSULE | Refills: 3 | Status: SHIPPED | OUTPATIENT
Start: 2025-03-23 | End: 2026-02-22

## 2025-03-20 ASSESSMENT — ACTIVITIES OF DAILY LIVING (ADL)
MANAGING_FINANCES: INDEPENDENT
DRESSING: INDEPENDENT
TAKING_MEDICATION: INDEPENDENT
GROCERY_SHOPPING: INDEPENDENT
BATHING: INDEPENDENT
DOING_HOUSEWORK: INDEPENDENT

## 2025-03-20 ASSESSMENT — ENCOUNTER SYMPTOMS
LOSS OF SENSATION IN FEET: 0
DEPRESSION: 0
OCCASIONAL FEELINGS OF UNSTEADINESS: 0

## 2025-03-20 ASSESSMENT — PATIENT HEALTH QUESTIONNAIRE - PHQ9
1. LITTLE INTEREST OR PLEASURE IN DOING THINGS: NOT AT ALL
SUM OF ALL RESPONSES TO PHQ9 QUESTIONS 1 AND 2: 0
2. FEELING DOWN, DEPRESSED OR HOPELESS: NOT AT ALL

## 2025-03-20 NOTE — PROGRESS NOTES
"Subjective   Reason for Visit: Edda Tee is an 80 y.o. female here for a Medicare Wellness visit.     Past Medical, Surgical, and Family History reviewed and updated in chart.    Reviewed all medications by prescribing practitioner or clinical pharmacist (such as prescriptions, OTCs, herbal therapies and supplements) and documented in the medical record.    HPI    Patient Care Team:  Sunshine Warren MD as PCP - General  Sunshine Warren MD as PCP - O Medicare Advantage PCP  Mango Gilbert MD as Consulting Physician (Hematology and Oncology)     Review of Systems    Objective   Vitals:  /60 (BP Location: Left arm, Patient Position: Sitting, BP Cuff Size: Adult)   Pulse 62   Temp 36.4 °C (97.6 °F) (Temporal)   Ht 1.6 m (5' 3\")   Wt 48.5 kg (107 lb)   SpO2 99%   BMI 18.95 kg/m²       Physical Exam    Assessment & Plan  Lipid screening         Dyslipidemia    Orders:    CBC; Future    Comprehensive Metabolic Panel; Future    Lipid Panel; Future    TSH with reflex to Free T4 if abnormal; Future    Chronic systolic congestive heart failure    Orders:    B-type natriuretic peptide; Future    Routine general medical examination at health care facility    Orders:    1 Year Follow Up In [facility] - PCP - Wellness Exam; Future              "

## 2025-03-20 NOTE — PATIENT INSTRUCTIONS
It was a pleasure to see you today.  I would like to remind you about importance of a healthy lifestyle in order to improve your well-being and live longer. Try to engage in physical activities for at least 150 minutes per week.  Eat about 10 servings of fruits and vegetables daily. My advice is 2 servings of fruits and 8 servings of vegetables. For vegetables choose at least half of them green and at least half of them fresh.  Please avoid sugar, salt, fried food and saturated fat.  Please follow low carbohydrate diet and daily exercise routine for at least 30 minutes. Nutritional consultation is available, please let me know if you are interested. I will be happy to discuss details with you if interested.   Have a good day and stay well.      The ability to age comfortably depends on how you invest in your body.   Include physical activity in your daily routine. Physical activity increases blood flow to your whole body, including your brain. ...  Eat a healthy diet. A heart-healthy diet may benefit your brain.   Stay mentally active. Be social.   Treat cardiovascular disease.  No smoking, excessive EtOH intake or illicit drug use.

## 2025-03-21 LAB — 25(OH)D3+25(OH)D2 SERPL-MCNC: 40 NG/ML (ref 30–100)

## 2025-03-31 ENCOUNTER — TELEPHONE (OUTPATIENT)
Dept: PRIMARY CARE | Facility: CLINIC | Age: 80
End: 2025-03-31
Payer: MEDICARE

## 2025-04-11 ENCOUNTER — APPOINTMENT (OUTPATIENT)
Dept: PHARMACY | Facility: HOSPITAL | Age: 80
End: 2025-04-11
Payer: MEDICARE

## 2025-04-11 DIAGNOSIS — J44.1 COPD EXACERBATION (MULTI): ICD-10-CM

## 2025-04-11 DIAGNOSIS — J43.9 PULMONARY EMPHYSEMA, UNSPECIFIED EMPHYSEMA TYPE (MULTI): ICD-10-CM

## 2025-04-11 NOTE — PROGRESS NOTES
"  Clinical Pharmacy Appointment    Patient ID: Edda Tee \"Carrie" is a 80 y.o. female who presents for COPD.    Pt is here for First appointment.     Referring Provider: Sunshine Warren MD  PCP: Sunshine Warren MD   Last visit with PCP: 03/20/2025   Next visit with PCP: 09/23/2025    Subjective     Drug Interactions  No relevant drug interactions were noted.    Medication System Management  Adherence/Organization:   Has not been using Calcitonin   Forgets to take her medications maybe 2-3 times per week, typically only forgets the morning doses  Uses a pill box to help with organization   Affordability/Accessibility:   Eliquis is $42 per month   The rest of her medications are affordable     Patient's Preferred Pharamcy    [id number] - [place], [place] - [address] [facility]  [address] [facility] [address]  Phone: [phone] Fax: [phone]    [facility] - [place] [id number] [facility]  [address] [address]  Phone: [phone] Fax: [phone]     Patient was referred to the clinical pharmacy team for COPD.     HPI  PULMONARY ASSESSMENT  Patient has been diagnosed with:  COPD with emphysema  Does patient see pulmonology: No, previously has seen   has not had PFT's completed in last 2 years  FEV1 82% pred 01/17/23    Current Regimen  None    Historical Treatment  None     Adverse Effects: none reported today      Symptom Management  Current symptoms: mild dyspnea daily upon activity, thinks that this could be due to her back and rib pain   Triggers: thinks she may have seasonal allergies, notes some itchy eyes recently   Alleviating factors: none   Exercise capacity: no limitation to activity   How often do you use your rescue inhaler? N/A, does not use a rescue inhaler     Exacerbation Hx  When was your last hospitalization for an exacerbation? No admission history in record for COPD exacerbation  When was the last time you were treated " "with antibiotics and/or steroids? Unknown      Immunization History:  Influenza: 10/14/24  PCV13: 05/24/17  PPSV23: 12/06/19  PCV20: None  COVID: None  RSV: None    Smoking History  She currently smokes  0.25  packs per day.      SMOKING CESSATION ASSESSMENT  The \"5 A's\"  Ask about tobacco use  Number of cigarettes per day: 5-6  How long after waking until 1st cigarette? 2-3 hours     Advise to quit  Patient was informed of and is aware of the importance of quitting smoking to her general health and the positive impacts it would have on her COPD.     Assess willingness to make a quit attempt  Patient willing to set a quit date? Not at this time     Assist in quit attempt  Has Nicorette gum at home   Previous attempts?   When she previously quit smoking states she was in the hospital for an extended period of time and was able to quit then due to being in the hospital throughout the withdrawal period  Is patient aware of all pharmacologic options?   Patient is aware of nicotine replacement products and Wellbutrin   Patient was interested in using laser treatment but the cost was a little high    Arrange follow up  Patient will keep follow-ups with Dr. Warren and should inform her of any changes in her smoking status        Objective   Allergies   Allergen Reactions    Gabapentin Confusion     Was a little confused     Sulfa (Sulfonamide Antibiotics) Hives    Sulfanilamide (Bulk) Hives    Sulfasalazine Hives     Social History     Social History Narrative    Not on file     Medication Reconciliation:  No changes made today     Medication Review  Current Outpatient Medications   Medication Instructions    amiodarone (Pacerone) 200 mg tablet TAKE ONE TABLET BY MOUTH EVERY DAY    apixaban (ELIQUIS) 2.5 mg, oral, 2 times daily    calcitonin, salmon, (Miacalcin) 200 unit/actuation nasal spray 1 spray, One Nostril, Daily    carvedilol (COREG) 3.125 mg, oral, 2 times daily (morning and late afternoon)    ergocalciferol " "(VITAMIN D-2) 50,000 Units, oral, Once Weekly    hydrOXYzine HCL (ATARAX) 25 mg, oral, 2 times daily    MAGNESIUM GLUCONATE ORAL 500 mg, oral, 2 times daily    vit C/E/Zn/coppr/lutein/zeaxan (PRESERVISION AREDS-2 ORAL) 1 capsule, oral, 2 times daily      Vitals  BP Readings from Last 2 Encounters:   03/20/25 112/60   03/04/25 138/86     BMI Readings from Last 1 Encounters:   03/20/25 18.95 kg/m²      Labs  A1C  Lab Results   Component Value Date    HGBA1C 5.4 04/03/2024     BMP  Lab Results   Component Value Date    CALCIUM 9.2 11/12/2024     11/12/2024    K 4.6 11/12/2024    CO2 28 11/12/2024     11/12/2024    BUN 39 (H) 11/12/2024    CREATININE 2.03 (H) 11/12/2024    EGFR 25 (L) 11/12/2024     LFTs  Lab Results   Component Value Date    ALT 20 11/07/2024    AST 21 11/07/2024    ALKPHOS 151 (H) 11/07/2024    BILITOT 0.4 11/07/2024     FLP  Lab Results   Component Value Date    TRIG 159 (H) 03/11/2024    CHOL 184 03/11/2024    LDLF 122 (H) 07/02/2021    LDLCALC 93 03/11/2024    HDL 59.2 03/11/2024     Urine Microalbumin  No results found for: \"MICROALBCREA\"    Weight Management  Wt Readings from Last 3 Encounters:   03/20/25 48.5 kg (107 lb)   03/04/25 48.5 kg (107 lb)   12/18/24 49.9 kg (110 lb)      Estimated body mass index is 18.95 kg/m² as calculated from the following:    Height as of 3/20/25: 1.6 m (5' 3\").    Weight as of 3/20/25: 48.5 kg (107 lb).     Assessment/Plan   Problem List Items Addressed This Visit       COPD (chronic obstructive pulmonary disease) with emphysema (Multi)     Patient with COPD that is generally well controlled. She notes that she has some mild shortness of breath with activity, does not use inhalers and has done well without them. Thinks her shortness of breath may be due to back and rib pain. No recommendations for additions to medication therapy today.     Future Considerations:  Could use an albuterol rescue inhaler in the future if shortness of breath worsens "     Monitoring and Education:  Discussed smoking cessation with patient today. She is aware of the benefits of quitting smoking and discussed again with her today. She has nicotine gum and she thinks she has patches at home as well.   Informed patient that when she is ready to quit she should set a quit date. On that date she should switch from smoking cigarettes to using nicotine replacement products. She should not smoke and use nicotine replacement products concurrently.   Also informed patient that with her current daily cigarette usage, she would start with the step 2 nicotine patches, not step 1, if she wanted to utilize them.     At this time no further scheduled follow-up with the pharmacy team is needed. Patient was encouraged to reach out with any questions and/or concerns in the future.           Other Visit Diagnoses       COPD exacerbation (Multi)              Pharmacy Follow-Up: As needed per patient or provider request  PCP Follow-Up: 09/23/2025    Continue all meds under the continuation of care with the referring provider and clinical pharmacy team.    Thank you,    Octavio Kaur, PharmD   Clinical Pharmacist    Verbal consent to manage patient's drug therapy was obtained from the patient. They were informed they may decline to participate or withdraw from participation in pharmacy services at any time.

## 2025-04-11 NOTE — ASSESSMENT & PLAN NOTE
Patient with COPD that is generally well controlled. She notes that she has some mild shortness of breath with activity, does not use inhalers and has done well without them. Thinks her shortness of breath may be due to back and rib pain. No recommendations for additions to medication therapy today.     Future Considerations:  Could use an albuterol rescue inhaler in the future if shortness of breath worsens     Monitoring and Education:  Discussed smoking cessation with patient today. She is aware of the benefits of quitting smoking and discussed again with her today. She has nicotine gum and she thinks she has patches at home as well.   Informed patient that when she is ready to quit she should set a quit date. On that date she should switch from smoking cigarettes to using nicotine replacement products. She should not smoke and use nicotine replacement products concurrently.   Also informed patient that with her current daily cigarette usage, she would start with the step 2 nicotine patches, not step 1, if she wanted to utilize them.     At this time no further scheduled follow-up with the pharmacy team is needed. Patient was encouraged to reach out with any questions and/or concerns in the future.

## 2025-04-25 ENCOUNTER — DOCUMENTATION (OUTPATIENT)
Dept: CARDIAC SURGERY | Facility: HOSPITAL | Age: 80
End: 2025-04-25
Payer: MEDICARE

## 2025-04-26 DIAGNOSIS — L29.9 ITCHING: ICD-10-CM

## 2025-04-27 RX ORDER — HYDROXYZINE HYDROCHLORIDE 25 MG/1
25 TABLET, FILM COATED ORAL 2 TIMES DAILY
Qty: 90 TABLET | Refills: 0 | Status: SHIPPED | OUTPATIENT
Start: 2025-04-27

## 2025-04-30 ENCOUNTER — HOSPITAL ENCOUNTER (OUTPATIENT)
Dept: RADIOLOGY | Facility: CLINIC | Age: 80
Discharge: HOME | End: 2025-04-30
Payer: MEDICARE

## 2025-04-30 DIAGNOSIS — C34.31 MALIGNANT NEOPLASM OF LOWER LOBE OF RIGHT LUNG (MULTI): ICD-10-CM

## 2025-04-30 PROCEDURE — 71250 CT THORAX DX C-: CPT

## 2025-05-05 ENCOUNTER — APPOINTMENT (OUTPATIENT)
Dept: CARDIOLOGY | Facility: HOSPITAL | Age: 80
End: 2025-05-05
Payer: MEDICARE

## 2025-05-05 ENCOUNTER — TELEPHONE (OUTPATIENT)
Dept: RADIATION ONCOLOGY | Facility: HOSPITAL | Age: 80
End: 2025-05-05
Payer: MEDICARE

## 2025-05-05 NOTE — TELEPHONE ENCOUNTER
Called pt to remind of appointment on 5/6/2025 at 2:30. Pt's phone went to voicemail left number if needs to reschedule.      Pt is aware that the appointment is a phone/virtual visit, pt. understands that he/she doesn't have to show up in office.

## 2025-05-06 ENCOUNTER — HOSPITAL ENCOUNTER (OUTPATIENT)
Dept: RADIATION ONCOLOGY | Facility: HOSPITAL | Age: 80
Setting detail: RADIATION/ONCOLOGY SERIES
Discharge: HOME | End: 2025-05-06
Payer: MEDICARE

## 2025-05-06 DIAGNOSIS — C34.31 MALIGNANT NEOPLASM OF LOWER LOBE OF RIGHT LUNG (MULTI): Primary | ICD-10-CM

## 2025-05-06 PROCEDURE — 99214 OFFICE O/P EST MOD 30 MIN: CPT | Performed by: NURSE PRACTITIONER

## 2025-05-06 PROCEDURE — G2211 COMPLEX E/M VISIT ADD ON: HCPCS | Performed by: NURSE PRACTITIONER

## 2025-05-06 PROCEDURE — 99214 OFFICE O/P EST MOD 30 MIN: CPT | Mod: 95 | Performed by: NURSE PRACTITIONER

## 2025-05-06 ASSESSMENT — ENCOUNTER SYMPTOMS
SHORTNESS OF BREATH: 1
GASTROINTESTINAL NEGATIVE: 1
FATIGUE: 0
BACK PAIN: 1
SPEECH DIFFICULTY: 0
CHEST TIGHTNESS: 0
DIAPHORESIS: 0
HEMATOLOGIC/LYMPHATIC NEGATIVE: 1
PSYCHIATRIC NEGATIVE: 1
TREMORS: 0
CHOKING: 0
COUGH: 1
UNEXPECTED WEIGHT CHANGE: 0
APNEA: 0
NUMBNESS: 0
LIGHT-HEADEDNESS: 0
SEIZURES: 0
HEADACHES: 0
CARDIOVASCULAR NEGATIVE: 1
FEVER: 0
WEAKNESS: 0
FACIAL ASYMMETRY: 0
ACTIVITY CHANGE: 0
DIZZINESS: 0
APPETITE CHANGE: 0
CHILLS: 0

## 2025-05-06 NOTE — PROGRESS NOTES
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 Patient is a 80 y.o. female who presents today for follow up 3 years and 3 months s/p SBRT to the right lung with Dr. Lundy. Patient is doing ok. Energy could be better. Appetite is good. Weight stable.  Breathing is baseline. Continues to endorses SOB with exertion and cough that is productive at times of clear phlegm.  Denies  chest pain or fevers. No oxygen or inhaler requirements. Smokes 5-6 cigarettes a day. Down from 1ppd. Continues to have pain around the rib cage intermittently.  Last visit she had two fractures. She has been following with pain management. She had one injection. Was not sure if it helped. States they discussed a nerve block. She needs to schedule an appointment.  No neurological symptoms. Follows with PCP, cardiologist, and vascular regularly. She has follow up with Dr. Gilbert scheduled for May 16th.    Review of systems:  Review of Systems   Constitutional:  Negative for activity change, appetite change, chills, diaphoresis, fatigue, fever and unexpected weight change.   HENT: Negative.     Respiratory:  Positive for cough and shortness of breath (with exertion). Negative for apnea, choking and chest tightness.    Cardiovascular: Negative.    Gastrointestinal: Negative.    Musculoskeletal:  Positive for back pain.   Skin: Negative.    Neurological:  Negative for dizziness, tremors, seizures, syncope, facial asymmetry, speech difficulty, weakness, light-headedness, numbness and headaches.   Hematological: Negative.    Psychiatric/Behavioral: Negative.   "       Past Medical history  She  has a past surgical history that includes Other surgical history (11/22/2022) and CT angio aorta and bilateral iliofemoral runoff including without contrast if performed (3/8/2023).    Radiology results:  CT chest wo IV contrast 04/30/2025    Narrative  Interpreted By:  Sean Dutton,  STUDY:  CT CHEST WO IV CONTRAST;  4/30/2025 10:38 am    INDICATION:  Signs/Symptoms:history of lung cancer s/p RT. Short interval scan to  assess new nodules seen on CT from 1/28/25.    ,C34.31 Malignant neoplasm of lower lobe, right bronchus or lung    COMPARISON:  Noncontrast chest CT of 01/28/2025.    ACCESSION NUMBER(S):  [id number]    ORDERING CLINICIAN:  ANGELINA STRONG    TECHNIQUE:  Helical data acquisition of the chest was obtained  without IV  contrast material.  Images were reformatted in axial, coronal, and  sagittal planes.    FINDINGS:  LUNGS AND AIRWAYS:  The trachea and central airways are patent. No endobronchial lesion.    Interval resolution of previously seen focus of clustered nodules  measuring between 3 and 4 mm posteriorly in the left lower lobe,  likely representing resolution of an inflammatory process.    No substantial change in the appearance of a medial right middle lobe  mass that measures 4.0 x 2.0 x 2.9 cm (series 202, image 180; series  203, image 31) with associated adjacent volume loss and parenchymal  opacity that likely relates to posttreatment change.    There is a spiculated nodule in the right upper lobe measuring up to  7 mm in diameter (series 205, image 135), with some small regions of  the air-filled cystic change, without substantial change from the  prior CT.    There is a additional small spiculated nodular density in the  posterior aspect of the right middle lobe abutting the major fissure  measuring up to 10-11 mm in diameter (series 205, image 152),  unchanged in appearance on direct comparison with prior imaging, with  differences in measurements  between the 2 exams due to slight  differences in measurement technique.    Unchanged 4 mm nodule in the left lower lobe on series 205, image  209. Stable 3-mm focus of subpleural nodularity on series 205, image  160. Incidental small calcified granuloma in the left lower lobe on  series 203, image 67.    No new pulmonary nodule.    Redemonstrated severe emphysema.  No focal consolidation, pleural  effusion, or pneumothorax.        MEDIASTINUM AND ADY, LOWER NECK AND AXILLA:  The visualized thyroid gland is within normal limits.    Unchanged appearance of mildly enlarged mediastinal nodes, with the  largest a subcarinal node measuring approximately 14 mm in short axis.    Esophagus appears within normal limits as seen.    HEART AND VESSELS:  Thoracic aorta is tortuous. There is unchanged fusiform dilation of  the distal descending aorta measuring up to 4.4 cm. Moderate to  severe atherosclerotic calcifications of thoracic aorta and branch  vessels.    Main pulmonary artery and its branches are normal in caliber.    Moderate coronary artery calcifications are seen. The study is not  optimized for evaluation of coronary arteries.    Heart is mildly enlarged with disproportionate right atrial  distention, stable. Pacing leads in right atrium and right ventricle.    No evidence of pericardial effusion.    UPPER ABDOMEN:  The visualized subdiaphragmatic structures demonstrate no acute  findings. Incidental small simple and hemorrhagic/proteinaceous  cortical cysts in both kidneys. Cholelithiasis without evidence of  acute cholecystitis. Severe athero sclerotic calcifications of the  abdominal aorta and branch vessels. Borderline fusiform aneurysmal  dilation of the infrarenal abdominal aorta.    CHEST WALL AND OSSEOUS STRUCTURES:  Similar appearance of severe compression of T7 vertebral body with  vertebroplasty changes. Mild reverse sigmoid thoracolumbar spinal  curvature redemonstrated. Severe degenerative changes of  the imaged  spine and mild bilateral glenohumeral osteoarthropathy. No acute  osseous abnormality or suspicious osseous lesion.    Impression  Interval resolution of previously seen focus of clustered nodules  measuring between 3 and 4 mm posteriorly in the left lower lobe,  likely representing resolution of an inflammatory process.    No substantial change in the appearance of a medial right middle lobe  mass that measures 4.0 x 2.0 x 2.9 cm (series 202, image 180; series  203, image 31) with associated adjacent volume loss and parenchymal  opacity that likely relates to posttreatment change.    There is a spiculated nodule in the right upper lobe measuring up to  7 mm in diameter (series 205, image 135), with some small regions of  the air-filled cystic change, without substantial change from the  prior CT.    There is an additional small spiculated nodular density in the  posterior aspect of the right middle lobe abutting the major fissure  measuring up to 10-11 mm in diameter (series 205, image 152),  unchanged in appearance on direct comparison with prior imaging, with  differences in measurements between the 2 exams due to slight  differences in measurement technique.    Unchanged 4 mm nodule in the left lower lobe on series 205, image  209. Stable 3-mm focus of subpleural nodularity on series 205, image  160.    Unchanged mildly enlarged mediastinal lymph nodes.    There is unchanged fusiform dilation of the distal descending aorta  measuring up to 4.4 cm. Severe atherosclerosis. Cardiomegaly. Cardiac  pacer.    Additional findings as discussed above.      MACRO:  None    Signed by: Sean Dutton 5/2/2025 11:15 AM  Dictation workstation:   [id number]     Plan:  Assessment/Plan     80 year old female 3 years and 3 months  s/p SBRT to the right lung. Doing well from a radiation stand point. Breathing stable. CT of the chest done 4/30/25. Scan is stable with resolution of previously seen focus of clustered nodules  measuring between 3 and 4 mm posteriorly in the left lower lobe,  Continue follow up with pain management, vascular, PCP and cardiology as scheduled. Continue follow up with Dr. Gilbert as scheduled.  Discussed referral to pulmonology for SOB and cough/COPD management. Patient declined and states she will discuss with her PCP. Patient to return to clinic in 6 months with CT of the chest prior unless needs to be seen sooner. Instructed to call with any questions or concerns.     Virtual or Telephone Consent    While technically available, the patient was unable or unwilling to consent to connect via audio/video telehealth technology; therefore, I performed this visit using a real-time audio only connection between Edda Tee & Aracelis Tony, AZUCENA-CNP.  Verbal consent was requested and obtained from Edda Tee on this date, 05/06/25 for a telehealth visit and the patient's location was confirmed at the time of the visit.

## 2025-05-08 ENCOUNTER — APPOINTMENT (OUTPATIENT)
Dept: CARDIOLOGY | Facility: CLINIC | Age: 80
End: 2025-05-08
Payer: MEDICARE

## 2025-05-14 ENCOUNTER — HOSPITAL ENCOUNTER (OUTPATIENT)
Dept: CARDIOLOGY | Facility: CLINIC | Age: 80
Discharge: HOME | End: 2025-05-14
Payer: MEDICARE

## 2025-05-14 DIAGNOSIS — Z95.810 ICD (IMPLANTABLE CARDIOVERTER-DEFIBRILLATOR) IN PLACE: ICD-10-CM

## 2025-05-14 DIAGNOSIS — I47.20 PAROXYSMAL VENTRICULAR TACHYCARDIA: ICD-10-CM

## 2025-05-14 PROCEDURE — 93296 REM INTERROG EVL PM/IDS: CPT

## 2025-05-15 ENCOUNTER — TELEPHONE (OUTPATIENT)
Dept: VASCULAR SURGERY | Facility: HOSPITAL | Age: 80
End: 2025-05-15
Payer: MEDICARE

## 2025-05-15 NOTE — TELEPHONE ENCOUNTER
I have attempted to contact  Mrs. Tee   . There is no answer at the following phone number [phone] . I have left a voice mail message for the patient to contact Dr. Dixon's  office nurse at [phone]     The patient  has not completed her ABIto schedule a follow up appointment.  Gabriela Dobson RN BSN    Statement Selected

## 2025-05-16 ENCOUNTER — TELEPHONE (OUTPATIENT)
Dept: HEMATOLOGY/ONCOLOGY | Facility: CLINIC | Age: 80
End: 2025-05-16
Payer: MEDICARE

## 2025-05-16 NOTE — TELEPHONE ENCOUNTER
Call to patient regarding missed appointment. Left VM message requesting call back to reschedule.

## 2025-05-20 ENCOUNTER — APPOINTMENT (OUTPATIENT)
Dept: CARDIOLOGY | Facility: CLINIC | Age: 80
End: 2025-05-20
Payer: MEDICARE

## 2025-06-06 ENCOUNTER — OFFICE VISIT (OUTPATIENT)
Dept: HEMATOLOGY/ONCOLOGY | Facility: CLINIC | Age: 80
End: 2025-06-06
Payer: MEDICARE

## 2025-06-06 ENCOUNTER — APPOINTMENT (OUTPATIENT)
Dept: LAB | Facility: CLINIC | Age: 80
End: 2025-06-06
Payer: MEDICARE

## 2025-06-06 VITALS
HEART RATE: 70 BPM | WEIGHT: 110.01 LBS | DIASTOLIC BLOOD PRESSURE: 79 MMHG | BODY MASS INDEX: 19.49 KG/M2 | SYSTOLIC BLOOD PRESSURE: 148 MMHG | TEMPERATURE: 96.8 F | RESPIRATION RATE: 16 BRPM | OXYGEN SATURATION: 97 %

## 2025-06-06 DIAGNOSIS — I10 BENIGN ESSENTIAL HYPERTENSION: ICD-10-CM

## 2025-06-06 DIAGNOSIS — N18.4 CHRONIC RENAL DISEASE, STAGE IV (MULTI): ICD-10-CM

## 2025-06-06 DIAGNOSIS — I48.0 PAROXYSMAL A-FIB (MULTI): ICD-10-CM

## 2025-06-06 DIAGNOSIS — J43.9 PULMONARY EMPHYSEMA, UNSPECIFIED EMPHYSEMA TYPE (MULTI): ICD-10-CM

## 2025-06-06 DIAGNOSIS — C34.2 MALIGNANT NEOPLASM OF MIDDLE LOBE OF RIGHT LUNG (MULTI): Primary | ICD-10-CM

## 2025-06-06 LAB
ALBUMIN SERPL BCP-MCNC: 3.9 G/DL (ref 3.4–5)
ALP SERPL-CCNC: 110 U/L (ref 33–136)
ALT SERPL W P-5'-P-CCNC: 11 U/L (ref 7–45)
ANION GAP SERPL CALC-SCNC: 10 MMOL/L (ref 10–20)
AST SERPL W P-5'-P-CCNC: 15 U/L (ref 9–39)
BASOPHILS # BLD AUTO: 0.03 X10*3/UL (ref 0–0.1)
BASOPHILS NFR BLD AUTO: 0.4 %
BILIRUB SERPL-MCNC: 0.5 MG/DL (ref 0–1.2)
BUN SERPL-MCNC: 27 MG/DL (ref 6–23)
CALCIUM SERPL-MCNC: 9.5 MG/DL (ref 8.6–10.3)
CHLORIDE SERPL-SCNC: 106 MMOL/L (ref 98–107)
CO2 SERPL-SCNC: 30 MMOL/L (ref 21–32)
CREAT SERPL-MCNC: 1.6 MG/DL (ref 0.5–1.05)
EGFRCR SERPLBLD CKD-EPI 2021: 33 ML/MIN/1.73M*2
EOSINOPHIL # BLD AUTO: 0.2 X10*3/UL (ref 0–0.4)
EOSINOPHIL NFR BLD AUTO: 3 %
ERYTHROCYTE [DISTWIDTH] IN BLOOD BY AUTOMATED COUNT: 14.7 % (ref 11.5–14.5)
GLUCOSE SERPL-MCNC: 83 MG/DL (ref 74–99)
HCT VFR BLD AUTO: 42.9 % (ref 36–46)
HGB BLD-MCNC: 13.8 G/DL (ref 12–16)
IMM GRANULOCYTES # BLD AUTO: 0.01 X10*3/UL (ref 0–0.5)
IMM GRANULOCYTES NFR BLD AUTO: 0.1 % (ref 0–0.9)
LYMPHOCYTES # BLD AUTO: 1.93 X10*3/UL (ref 0.8–3)
LYMPHOCYTES NFR BLD AUTO: 28.5 %
MCH RBC QN AUTO: 31.5 PG (ref 26–34)
MCHC RBC AUTO-ENTMCNC: 32.2 G/DL (ref 32–36)
MCV RBC AUTO: 98 FL (ref 80–100)
MONOCYTES # BLD AUTO: 0.97 X10*3/UL (ref 0.05–0.8)
MONOCYTES NFR BLD AUTO: 14.3 %
NEUTROPHILS # BLD AUTO: 3.63 X10*3/UL (ref 1.6–5.5)
NEUTROPHILS NFR BLD AUTO: 53.7 %
PLATELET # BLD AUTO: 135 X10*3/UL (ref 150–450)
POTASSIUM SERPL-SCNC: 5.5 MMOL/L (ref 3.5–5.3)
PROT SERPL-MCNC: 7.3 G/DL (ref 6.4–8.2)
RBC # BLD AUTO: 4.38 X10*6/UL (ref 4–5.2)
SODIUM SERPL-SCNC: 140 MMOL/L (ref 136–145)
WBC # BLD AUTO: 6.8 X10*3/UL (ref 4.4–11.3)

## 2025-06-06 PROCEDURE — 1160F RVW MEDS BY RX/DR IN RCRD: CPT | Performed by: INTERNAL MEDICINE

## 2025-06-06 PROCEDURE — G2211 COMPLEX E/M VISIT ADD ON: HCPCS | Performed by: INTERNAL MEDICINE

## 2025-06-06 PROCEDURE — 99214 OFFICE O/P EST MOD 30 MIN: CPT | Performed by: INTERNAL MEDICINE

## 2025-06-06 PROCEDURE — 3077F SYST BP >= 140 MM HG: CPT | Performed by: INTERNAL MEDICINE

## 2025-06-06 PROCEDURE — 3078F DIAST BP <80 MM HG: CPT | Performed by: INTERNAL MEDICINE

## 2025-06-06 PROCEDURE — 80053 COMPREHEN METABOLIC PANEL: CPT | Performed by: INTERNAL MEDICINE

## 2025-06-06 PROCEDURE — 85025 COMPLETE CBC W/AUTO DIFF WBC: CPT | Performed by: INTERNAL MEDICINE

## 2025-06-06 PROCEDURE — 82378 CARCINOEMBRYONIC ANTIGEN: CPT | Performed by: INTERNAL MEDICINE

## 2025-06-06 PROCEDURE — 1159F MED LIST DOCD IN RCRD: CPT | Performed by: INTERNAL MEDICINE

## 2025-06-06 PROCEDURE — 1126F AMNT PAIN NOTED NONE PRSNT: CPT | Performed by: INTERNAL MEDICINE

## 2025-06-06 ASSESSMENT — PAIN SCALES - GENERAL: PAINLEVEL_OUTOF10: 0-NO PAIN

## 2025-06-07 PROBLEM — I50.43: Status: RESOLVED | Noted: 2023-09-05 | Resolved: 2025-06-07

## 2025-06-07 LAB — CEA SERPL-MCNC: 6.3 UG/L

## 2025-06-07 ASSESSMENT — ENCOUNTER SYMPTOMS
ENDOCRINE NEGATIVE: 1
CONSTITUTIONAL NEGATIVE: 1
PSYCHIATRIC NEGATIVE: 1
EYES NEGATIVE: 1
CARDIOVASCULAR NEGATIVE: 1
HEMATOLOGIC/LYMPHATIC NEGATIVE: 1
MUSCULOSKELETAL NEGATIVE: 1
NEUROLOGICAL NEGATIVE: 1
GASTROINTESTINAL NEGATIVE: 1
RESPIRATORY NEGATIVE: 1

## 2025-06-10 DIAGNOSIS — I48.0 PAROXYSMAL ATRIAL FIBRILLATION (MULTI): ICD-10-CM

## 2025-06-10 RX ORDER — AMIODARONE HYDROCHLORIDE 200 MG/1
200 TABLET ORAL DAILY
Qty: 90 TABLET | Refills: 0 | Status: SHIPPED | OUTPATIENT
Start: 2025-06-10

## 2025-06-17 ENCOUNTER — APPOINTMENT (OUTPATIENT)
Dept: CARDIOLOGY | Facility: CLINIC | Age: 80
End: 2025-06-17
Payer: MEDICARE

## 2025-07-01 ENCOUNTER — APPOINTMENT (OUTPATIENT)
Dept: CARDIOLOGY | Facility: CLINIC | Age: 80
End: 2025-07-01
Payer: MEDICARE

## 2025-07-01 VITALS
HEIGHT: 63 IN | WEIGHT: 110 LBS | DIASTOLIC BLOOD PRESSURE: 80 MMHG | BODY MASS INDEX: 19.49 KG/M2 | SYSTOLIC BLOOD PRESSURE: 130 MMHG

## 2025-07-01 DIAGNOSIS — I48.0 PAROXYSMAL A-FIB (MULTI): ICD-10-CM

## 2025-07-01 DIAGNOSIS — R06.02 EXERTIONAL SHORTNESS OF BREATH: ICD-10-CM

## 2025-07-01 DIAGNOSIS — I73.9 CLAUDICATION, INTERMITTENT: ICD-10-CM

## 2025-07-01 DIAGNOSIS — R06.02 SHORTNESS OF BREATH: Primary | ICD-10-CM

## 2025-07-01 DIAGNOSIS — I73.9 PAD (PERIPHERAL ARTERY DISEASE): ICD-10-CM

## 2025-07-01 DIAGNOSIS — I50.22 CHRONIC SYSTOLIC CONGESTIVE HEART FAILURE: ICD-10-CM

## 2025-07-01 PROCEDURE — 4004F PT TOBACCO SCREEN RCVD TLK: CPT | Performed by: STUDENT IN AN ORGANIZED HEALTH CARE EDUCATION/TRAINING PROGRAM

## 2025-07-01 PROCEDURE — 3075F SYST BP GE 130 - 139MM HG: CPT | Performed by: STUDENT IN AN ORGANIZED HEALTH CARE EDUCATION/TRAINING PROGRAM

## 2025-07-01 PROCEDURE — 1159F MED LIST DOCD IN RCRD: CPT | Performed by: STUDENT IN AN ORGANIZED HEALTH CARE EDUCATION/TRAINING PROGRAM

## 2025-07-01 PROCEDURE — 99212 OFFICE O/P EST SF 10 MIN: CPT

## 2025-07-01 PROCEDURE — 3079F DIAST BP 80-89 MM HG: CPT | Performed by: STUDENT IN AN ORGANIZED HEALTH CARE EDUCATION/TRAINING PROGRAM

## 2025-07-01 PROCEDURE — 99214 OFFICE O/P EST MOD 30 MIN: CPT | Performed by: STUDENT IN AN ORGANIZED HEALTH CARE EDUCATION/TRAINING PROGRAM

## 2025-07-01 NOTE — PROGRESS NOTES
Chief complaint:   Chief Complaint   Patient presents with    Follow-up    Fatigue        History of Present Illness  Edda Tee is a 80 y.o. year old female with history of lung cancer, atrial fibrillation on amiodarone and Eliquis, nonischemic cardiomyopathy HFrEF LVEF 25%, status post ICD, moderate coronary artery disease, COPD, PAD status post prior iliac and SFA stents who is presenting for cardiovascular follow-up    Patient reports to be overall doing well.  She denies chest pain, no shortness of breath, no palpitations, or syncope. Does have some mild dizziness  She reports cramps in feet when standing or when walking. Denies pain at her calves or thighs. Overall reports symptoms are stable/unchanged compared to prior. No rest pain.     2025  - unfortunately still smokes  - no chest pain, but does feel fatigue  - no bleeding issues, but does report frequent bruising    Social History     Tobacco Use    Smoking status: Some Days     Current packs/day: 0.00     Types: Cigarettes     Last attempt to quit:      Years since quittin.4     Passive exposure: Past    Smokeless tobacco: Never   Vaping Use    Vaping status: Never Used   Substance Use Topics    Alcohol use: Never    Drug use: Never       Outpatient Medications:  Current Outpatient Medications   Medication Instructions    amiodarone (PACERONE) 200 mg, oral, Daily    apixaban (ELIQUIS) 2.5 mg, oral, 2 times daily    calcitonin, salmon, (Miacalcin) 200 unit/actuation nasal spray 1 spray, One Nostril, Daily    carvedilol (COREG) 3.125 mg, oral, 2 times daily (morning and late afternoon)    ergocalciferol (VITAMIN D-2) 50,000 Units, oral, Once Weekly    hydrOXYzine HCL (ATARAX) 25 mg, oral, 2 times daily    MAGNESIUM GLUCONATE ORAL 500 mg, 2 times daily    vit C/E/Zn/coppr/lutein/zeaxan (PRESERVISION AREDS-2 ORAL) 1 capsule, 2 times daily         Vitals:  Vitals:    25 1455   BP: 130/80       Physical Exam:  General: NAD,  well-appearing, elderly  HEENT: moist mucous membranes, no jaundice  Neck: No JVD, no carotid bruit  Lungs: CTA kathryn, no wheezing, coarse rales present  Cardiac: RRR, no murmurs  Abdomen: soft, non-tender, non-distended  Extremities: 2+ radial pulses, no edema, no wounds, 1+ palpable pedal pulses  Skin: warm, dry  Neurologic: AAOx3,  no focal deficits      Assessment/Plan       # Nonischemic cardiomyopathy  -Previously assessed as LVEF 20 to 25%, likely related to tachycardia induced CM  -Most recent echo with recovered LVEF to 60-65%  -Continue GDMT with carvedilol 3.125 mg twice daily and Aldactone    # Coronary artery disease  -Prior left heart cath with moderate CAD in the RCA, to be managed medically  -Patient is asymptomatic.  Patient is currently on Eliquis, not on antiplatelet while on Eliquis,   -Does not want to be on statin therapy    # Atrial fibrillation  -On reduced dose Eliquis 2.5 mg twice daily and amiodarone 200 mg daily  -Follows with EP    # Peripheral artery disease  -With history of prior lower extremity interventions.  She reports pain in her feet, but denies thigh or calf claudication symptoms  -Most recent MACKENZIE/TBI reviewed with evidence of  disease bilaterally, overall unchanged from prior. Continue to manage medically in the absence of foot wounds or rest pain.  Discussed with patient importance of regular footcare      Rafaela Dixon MD [facility]  Interventional Cardiology  Endovascular Interventions  [email or web address]    **Disclaimer: This note was dictated by speech recognition, and every effort has been made to prevent any error in transcription, however minor errors may be present**

## 2025-07-15 ENCOUNTER — APPOINTMENT (OUTPATIENT)
Dept: CARDIOLOGY | Facility: CLINIC | Age: 80
End: 2025-07-15
Payer: MEDICARE

## 2025-07-25 ENCOUNTER — APPOINTMENT (OUTPATIENT)
Dept: RADIOLOGY | Facility: HOSPITAL | Age: 80
End: 2025-07-25
Payer: MEDICARE

## 2025-07-25 ENCOUNTER — HOSPITAL ENCOUNTER (EMERGENCY)
Facility: HOSPITAL | Age: 80
Discharge: HOME | End: 2025-07-25
Attending: EMERGENCY MEDICINE
Payer: MEDICARE

## 2025-07-25 VITALS
OXYGEN SATURATION: 97 % | BODY MASS INDEX: 19.49 KG/M2 | RESPIRATION RATE: 18 BRPM | WEIGHT: 110 LBS | SYSTOLIC BLOOD PRESSURE: 157 MMHG | DIASTOLIC BLOOD PRESSURE: 76 MMHG | TEMPERATURE: 96.8 F | HEART RATE: 68 BPM | HEIGHT: 63 IN

## 2025-07-25 DIAGNOSIS — S32.009A CLOSED FRACTURE OF TRANSVERSE PROCESS OF LUMBAR VERTEBRA, INITIAL ENCOUNTER (MULTI): ICD-10-CM
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
## 2025-07-25 DIAGNOSIS — L29.9 ITCHING: ICD-10-CM

## 2025-07-25 PROCEDURE — 70450 CT HEAD/BRAIN W/O DYE: CPT

## 2025-07-25 PROCEDURE — 99284 EMERGENCY DEPT VISIT MOD MDM: CPT | Mod: 25 | Performed by: EMERGENCY MEDICINE

## 2025-07-25 PROCEDURE — 72131 CT LUMBAR SPINE W/O DYE: CPT | Mod: FOREIGN READ | Performed by: RADIOLOGY

## 2025-07-25 PROCEDURE — 2500000001 HC RX 250 WO HCPCS SELF ADMINISTERED DRUGS (ALT 637 FOR MEDICARE OP)

## 2025-07-25 PROCEDURE — 74176 CT ABD & PELVIS W/O CONTRAST: CPT | Mod: FOREIGN READ | Performed by: RADIOLOGY

## 2025-07-25 PROCEDURE — 72125 CT NECK SPINE W/O DYE: CPT

## 2025-07-25 PROCEDURE — 74176 CT ABD & PELVIS W/O CONTRAST: CPT

## 2025-07-25 PROCEDURE — 72128 CT CHEST SPINE W/O DYE: CPT | Mod: FOREIGN READ | Performed by: RADIOLOGY

## 2025-07-25 PROCEDURE — 99285 EMERGENCY DEPT VISIT HI MDM: CPT | Performed by: EMERGENCY MEDICINE

## 2025-07-25 PROCEDURE — 72170 X-RAY EXAM OF PELVIS: CPT

## 2025-07-25 PROCEDURE — 72170 X-RAY EXAM OF PELVIS: CPT | Mod: FOREIGN READ | Performed by: RADIOLOGY

## 2025-07-25 PROCEDURE — 70450 CT HEAD/BRAIN W/O DYE: CPT | Performed by: RADIOLOGY

## 2025-07-25 PROCEDURE — G0390 TRAUMA RESPONS W/HOSP CRITI: HCPCS

## 2025-07-25 PROCEDURE — 71045 X-RAY EXAM CHEST 1 VIEW: CPT | Mod: FOREIGN READ | Performed by: RADIOLOGY

## 2025-07-25 PROCEDURE — 71045 X-RAY EXAM CHEST 1 VIEW: CPT

## 2025-07-25 PROCEDURE — 72125 CT NECK SPINE W/O DYE: CPT | Performed by: RADIOLOGY

## 2025-07-25 PROCEDURE — 71250 CT THORAX DX C-: CPT | Mod: FOREIGN READ | Performed by: RADIOLOGY

## 2025-07-25 RX ORDER — HYDROCODONE BITARTRATE AND ACETAMINOPHEN 5; 325 MG/1; MG/1
1 TABLET ORAL EVERY 6 HOURS PRN
Qty: 5 TABLET | Refills: 0 | Status: SHIPPED | OUTPATIENT
Start: 2025-07-25 | End: 2025-08-01

## 2025-07-25 RX ORDER — LIDOCAINE 50 MG/G
1 PATCH TOPICAL DAILY PRN
Qty: 7 PATCH | Refills: 0 | Status: SHIPPED | OUTPATIENT
Start: 2025-07-25

## 2025-07-25 RX ORDER — OXYCODONE AND ACETAMINOPHEN 5; 325 MG/1; MG/1
1 TABLET ORAL ONCE
Refills: 0 | Status: COMPLETED | OUTPATIENT
Start: 2025-07-25 | End: 2025-07-25

## 2025-07-25 RX ORDER — LIDOCAINE 560 MG/1
1 PATCH PERCUTANEOUS; TOPICAL; TRANSDERMAL ONCE
Status: DISCONTINUED | OUTPATIENT
Start: 2025-07-25 | End: 2025-07-25 | Stop reason: HOSPADM

## 2025-07-25 RX ADMIN — OXYCODONE HYDROCHLORIDE AND ACETAMINOPHEN 1 TABLET: 5; 325 TABLET ORAL at 14:44

## 2025-07-25 ASSESSMENT — PAIN DESCRIPTION - PROGRESSION: CLINICAL_PROGRESSION: NOT CHANGED

## 2025-07-25 ASSESSMENT — PAIN SCALES - GENERAL: PAINLEVEL_OUTOF10: 10 - WORST POSSIBLE PAIN

## 2025-07-25 ASSESSMENT — LIFESTYLE VARIABLES
HAVE YOU EVER FELT YOU SHOULD CUT DOWN ON YOUR DRINKING: NO
EVER HAD A DRINK FIRST THING IN THE MORNING TO STEADY YOUR NERVES TO GET RID OF A HANGOVER: NO
EVER FELT BAD OR GUILTY ABOUT YOUR DRINKING: NO
HAVE PEOPLE ANNOYED YOU BY CRITICIZING YOUR DRINKING: NO
TOTAL SCORE: 0

## 2025-07-25 ASSESSMENT — PAIN DESCRIPTION - ORIENTATION: ORIENTATION: LEFT

## 2025-07-25 ASSESSMENT — PAIN DESCRIPTION - PAIN TYPE: TYPE: ACUTE PAIN

## 2025-07-25 ASSESSMENT — PAIN DESCRIPTION - ONSET: ONSET: SUDDEN

## 2025-07-25 ASSESSMENT — PAIN DESCRIPTION - DESCRIPTORS: DESCRIPTORS: DISCOMFORT

## 2025-07-25 ASSESSMENT — PAIN DESCRIPTION - FREQUENCY: FREQUENCY: CONSTANT/CONTINUOUS

## 2025-07-25 ASSESSMENT — PAIN - FUNCTIONAL ASSESSMENT: PAIN_FUNCTIONAL_ASSESSMENT: 0-10

## 2025-07-25 ASSESSMENT — PAIN DESCRIPTION - LOCATION: LOCATION: BACK

## 2025-07-25 NOTE — ED PROVIDER NOTES
Emergency Department Provider Note       History of Present Illness   History provided by: Patient  Limitations to History: None  External Records Reviewed with Brief Summary: previous FM visits     HPI:  Edda Tee is a 80 y.o. female presenting to the ED as a HIA Activation after she fell at home yesterday.  States that she was walking when she slipped and fell onto her left side.  She is on Eliquis.  She is not sure if she hit her head, denies LOC.  Has been able to ambulate, but has been unable to put as much weight on the left leg as she usually does.  Denies any other injury to the extremities.  She is not experiencing severe low back pain as well.    Physical Exam   Arrival Vitals:  T 36.1 °C (97 °F)  HR 70  /78  RR 18  O2 (!) 90 % None (Room air)    Primary Survey:  Airway: Intact & patent  Breathing: Equal breath sounds bilaterally  Circulation: 2+ radial and DP pulses bilaterally  Disability: GCS  .  Gross motor and sensation intact in the bilateral upper and lower extremities.  Exposure: Patient fully exposed, warm blankets applied    Secondary Survey:  Head: Atraumatic. No cephalohematoma.  Eye: Pupils equal, round, and reactive to light. Gaze is conjugate. No orbital ridge bony step-offs, or tenderness.  ENT:   Midface is stable.  No mandibular tenderness or dental malocclusion's.  There is no nasal bone tenderness or deformity.  No epistaxis.  No blood or CSF drainage and external auditory canals.  No intraoral lesions.  Neck: Cervical collar. There is no C-spine midline tenderness to palpation, step-offs, or deformities.  Trachea is midline.  Chest: Clear to auscultation bilaterally, no chest wall tenderness palpation, crepitus, flail segments noted.  No bruising or abrasions noted to the anterior chest wall.  Cardiovascular: Regular rate, rhythm  Abdomen: Soft, nontender, nondistended.  No bruising or lacerations noted.  Not peritonitic.  Pelvis: Stable to compression  : Normal  external genitalia, no blood at the urethral meatus  Back/spine: There is midline T and L-spine tenderness palpation,, no step-offs, or deformities.  No lacerations, abrasions, or bruising noted.  Extremities: No gross bony deformities, no bony tenderness palpation.  Full range of motion all in 4 extremities.  Skin: No lacerations, bruises, abrasions noted.   Neuro: Alert and oriented to person, place, time.  Face symmetric, speech fluent.  Gross motor and sensory function intact in the bilateral upper and lower extremities.      Medical Decision Making & ED Course   Medical Decision Makin y.o. female presenting to the ED as a HIA Activation after falling yesterday.  On arrival to the ED, the patient was immediately brought to the resuscitation bay.  Primary survey intact.  Secondary survey with midline lumbar and thoracic spine tenderness.  C-collar placed.  Pelvis stable.  She has tenderness to palpation about the paraspinal muscles of the lumbar spine.  Secondary survey otherwise unrevealing.  Chest x-ray and pelvis x-ray ordered to be done bedside per trauma protocol.  Will obtain CT head, CT and L-spine as well as CT of the chest abdomen pelvis without IV contrast given the patient's chronic kidney disease.  --------    EKG Independent Interpretation: EKG interpreted by myself. Please see ED Course for full interpretation.    Independent Result Review and Interpretation: Relevant laboratory and radiographic results were reviewed and independently interpreted by myself.  As necessary, they are commented on in the ED Course.    Social Determinants of Health which Significantly Impact Care: Social Determinants of Health which Significantly Impact Care: None identified     Chronic conditions affecting the patient's care: As documented above in [facility]    The patient was discussed with the following consultants/services: None    Care Considerations: As documented above in [facility]    ED Course:  ED Course as of  07/25/25 1449   Fri Jul 25, 2025   1241 CT head negative.  CT C-spine negative. [CL]   1403 Patient with acute fractures of the left transverse process at L2 and L3.  No acute injuries noted on CT chest abdomen pelvis otherwise. [CL]   1434 Discussed lumbar spine findings with neurosurgery, recommending nonop management and PCP follow-up. [CL]   1449 Patient given a prescription for Percocet to manage severe pain at home, otherwise advised to take Tylenol and or ibuprofen.  Advised to use her lidocaine patches as well. [CL]   1449 Discharged stable condition. [CL]      ED Course User Index  [CL] Marcio Camacho DO         Diagnoses as of 07/25/25 1449   Fall, initial encounter   Closed fracture of transverse process of lumbar vertebra, initial encounter (Multi)         Disposition   As a result of the work-up, the patient was discharged home.  she was informed of her diagnosis and instructed to come back with any concerns or worsening of condition.  she and was agreeable to the plan as discussed above.  she was given the opportunity to ask questions.  All of the patient's questions were answered.    Procedures   Procedures    Patient seen and discussed with ED attending physician.    Marcio Camacho DO  Emergency Medicine                                                       Marcio Camacho DO  Resident  07/25/25 1449

## 2025-07-25 NOTE — DISCHARGE INSTRUCTIONS
Your CT imaging showed a fracture through the transverse process of lumbar spine 2 and 3.  These are nonoperative fractures.    Please follow-up with neurosurgery.    Seek immediate medical attention if you develop: increasing pain, numbness, tingling, weakness, loss of motion in your arms or legs, loss of control of your urine or stool, fever, abdominal pain, chest pain, shortness of breath, or any new or worsening symptoms.

## 2025-07-27 RX ORDER — HYDROXYZINE HYDROCHLORIDE 25 MG/1
25 TABLET, FILM COATED ORAL 2 TIMES DAILY
Qty: 90 TABLET | Refills: 0 | Status: SHIPPED | OUTPATIENT
Start: 2025-07-27

## 2025-07-28 ENCOUNTER — TELEPHONE (OUTPATIENT)
Dept: PRIMARY CARE | Facility: CLINIC | Age: 80
End: 2025-07-28
Payer: MEDICARE

## 2025-07-28 NOTE — TELEPHONE ENCOUNTER
She called and wanted you to look in the chart and review, she was in the emergency room she had a fall and has a fracture, she just wanted you to review and if you get a chance to talk to her. She has a walker and is walking a little, not a lot is very careful with her feet so she does not fall.

## 2025-08-05 NOTE — PROGRESS NOTES
Subjective   Patient ID: Shannan Tee is a 80 y.o. female who presents for follow up ED visit, Seen in the ED 7/25/25 and Dx with Closed fracture of transverse process of lumbar vertebra         Seen in the ED 7/25/25 and Dx with Closed fracture of transverse process of lumbar vertebra   She admits to a fall 7/24/25 while walking she slipped and fell on her left side   She is not sure if she hit her head, denies LOC.  She feels she is healing very well. She has only taken 4- 5 of the pain medications   She is sleeping on the couch   She admits she has not been using the calcitonin nasal spray.   She has pain in the lower left rib     She does not think she is going in and out of a- fib  Her cardiologist said she had some episodes but she did not feel them       HEALTH:  PAP no longer needs to repeat   Mammo 1/08, 2016, 5/19, 8/20, 8/21, no longer needs to repeat   BD 4/16 T-1.9, 5/19 T-2.1, 8/21 T-2.5, 8/23 T-3.1.   Colon 6/08 adenoma, 5/12 and declines repeat. Declines Cologuard   ECHO 9/17, 5/2021, 3/23   Carotids 9/17 <50%    EKG 8/22, 4/23, 3/24, 5/24, 11/24 ED     Urine 2017, 1/19   Hep C 8/18 -  FLU 1/23, 10/24   TDAP 2013, 6/23 with injury   RSV discussed   Prevnar 5/17  Pneumo 1/2011  Shingrix recommend and declines   SARS CVD vaccine declines   Ophth Seen in 2/2024 for corneal abrasion left eye. She had cataract extraction in 2018. She is not wearing glasses except for night driving. No glaucoma or MD.  Copy of her Advanced Directives scanned in her chart 3/2023       Review of Systems  All systems negative except those listed in the HPI      Objective   Visit Vitals  /70 (BP Location: Left arm, Patient Position: Sitting, BP Cuff Size: Adult)    Body mass index is 18.6 kg/m².     Physical Exam  Vitals reviewed.   Constitutional:       Appearance: Normal appearance. She is normal weight.   HENT:      Head: Normocephalic.      Right Ear: Tympanic membrane, ear canal and external ear normal.      Left  Ear: Tympanic membrane, ear canal and external ear normal.      Nose: Nose normal.      Mouth/Throat:      Pharynx: Oropharynx is clear.     Eyes:      Conjunctiva/sclera: Conjunctivae normal.       Cardiovascular:      Rate and Rhythm: Normal rate and regular rhythm.      Pulses: Normal pulses.      Heart sounds: Normal heart sounds.      Comments: Sinus rhythm, HR 62   Pulmonary:      Effort: Pulmonary effort is normal.      Breath sounds: Normal breath sounds.   Abdominal:      Palpations: Abdomen is soft.     Musculoskeletal:         General: Normal range of motion.      Cervical back: Normal range of motion and neck supple.      Comments: no tenderness noted to the back on exam      Skin:     General: Skin is warm.     Neurological:      General: No focal deficit present.      Mental Status: She is alert and oriented to person, place, and time.     Psychiatric:         Mood and Affect: Mood normal.         Behavior: Behavior normal.         Thought Content: Thought content normal.         Judgment: Judgment normal.       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 Her son (Oliver) passed 6/23 from cirrhosis at age 42.      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 She has only taken 4- 5 of the pain medications   She is sleeping on the couch   She admits she has not been using the calcitonin nasal spray. Discussed importance of compliance with nasal spray. Restart calcitonin nasal spray as directed. Explained the proper way to use calcitonin nasal spray.   She has pain in the lower left rib      Seen in the ED 7/25/25 and Dx with Closed fracture of transverse process of lumbar vertebra: On exam: no tenderness noted on exam 8/25    She admits to a fall 7/24/25 while walking she slipped and fell on her left side   She is not sure if she hit her head, denies LOC.  CT head 7/25 negative   CT C-Spine negative 7/25,   CT L/S 7/25 acute fractures of the left transverse process at L2 and L3.   No acute injuries noted on CT chest abdomen pelvis otherwise   Recommending nonop management and PCP follow-up.   Patient given a prescription for Percocet to manage severe pain at home, otherwise advised to take Tylenol and or ibuprofen. Advised to use her lidocaine patches as well.           She continues to smoke. When she tries to quit on her own she gets insomnia        Her son did laser treatment for smoking.        Nicotine dependence I spent more than 3 minutes counseling patient about the        need for smoking cessation and how I can support efforts when patient is ready to       quit. Discussed nicotine replacement therapy, Varenicline, Buproprion, hypnosis,        support groups and acupuncture as potential options. Patient currently has no signs        or symptoms of tobacco related disease.       Ready to quit: No       Counseling given: Yes  8/25        Recommend 1-800-QUIT-NOW to assist with smoking cessation.             Her hearing is not as good as it used to be   She declines appt with audiology for now 1/2023      Her weight/ BMI is in low normal range in office, recommend she maintain  Her weight is 107 and BMI 18.95 IO 3/25      HTN: BP normal.    Continue hydroxyzine 25 mg BID    Recommend when she stand she count to 10 prior to taking her first steps.   Recommend increased sodium in her diet   EKG 5/24 a- fib with RVR    ECHO 5/2021 was normal but poor functional capacity   Stress test normal 5/2021  Recommend she monitor her BP at home and call with elevated readings.    She saw Dr Edward in 11/24      Long standing atrial fib with prior TIA in 9/2017: On exam: sinus rhythm, HR 62 8/25. She does not think she is going in and out of a- fib. Her cardiologist said she had some episodes but she did not feel them. Recommend she talk with Dr Edward to see if she is able to come off amiodarone.   Continue carvedilol 3.125 mg BID and amiodarone 200 mg daily  Continue Eliquis 2.5 mg BID and hydroxyzine 25 mg BID  She was on Pradaxa then Eliquis since 9/17 after she was admitted with TIA 9/17    - EP did an implantable loop on 11/18/22     - S/p DC shock and dual chamber ICD interrogation and reporgramming on 4/20/2023 with Dr Perez    Carotid duplex: <50% stenosis bilateral vertebra arteries patent with antegrade flow  She saw Dr Edward in 11/24       Acute respiratory failure and cardiac tamponade.   Admitted on 3/30/23 and discharged on 4/4/23  She presented on 3/30/23 for elective outpatient ICD placement which was complicated by cardiac tamponade and acute hypoxic respiratory failure.    - S/p percutaneous pericardiocentesis on 3/30/23.   Approximately 400 to 500 cc of blood removed from the pericardial sac.  CT 11/24 A 4 x 2 x 3 cm pleural-based medial right middle lobe mass and a mildly enlarged precarinal lymph node are minimally larger than in November 2024. Stable 8 mm spiculated minor fissure nodule. 3. New 3-4 mm left  lower lobe nodules. Marked emphysema. Pronounced coronary artery calcification. 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   She saw Dr Edward in 11/24     She saw Dr Zack Das in 7/25 -Most recent echo with recovered LVEF to 60-65%  Pt declines statin therapy    I have spent 15 min face to face with this patient discussing their cardiac risk   We discussed the patients cardiovascular risk. If needed, lifestyle modifications recommended including: behavioral therapies of nutrition choices, exercise and eliminate habits that are contributing to their cardiac risk. We agreed to a plan to decrease his cardiovascular risks. Discussed ASA. Reviewed Guidelines and approved recommendations made to patient.   The patient's 10 yr CV risk was estimated at  53.8 % IO 8/25      Middle cerebral aneurysm right side :  She needs CT angio and has to schedule since 2 mm only      Elevated lipids: Labs ordered and we will adjust if indicated  3/25   Explained goal for LDL to be less than 100 and ideally less than 70   She stopped atorvastatin and does not want to resume it   Discussed making healthier food choices and increased exercise      Hyperparathyroid: Labs ordered and we will adjust if indicated  3/25   Continue calcium 600 mg BID       CKD: Labs ordered and we will adjust if indicated  3/25  Recommend she avoid all NSAIDs   We will continue to monitor        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   Dr Pina did EBUS on 12/19/2022 showed Interval increase in size of right middle lobe mass when compared with the previous CT from 08/23/2021, concerning for neoplastic etiology. Interval increase in size of multiple mediastinal lymph nodes as detailed above, concerning for metastatic  lymphadenopathy. Moderate upper lobe predominant emphysematous changes.   Pathology results 12/2022 showed malignant cells derived from SCC   -treated with SBRT (5 treatments) from 1/30 to 2/3/2022  -5/8/23 PET interval decrease in size of RML mass with mild residual activity  CT 4/30/25 reviewed--interval resolution of previously seen focus of clustered nodules between 3 and 4 mm posteriorly in left lower lobe; no substantial change in appearance of medial right middle lobe mass 4.0 x 2.0 x 2.9 cm with associated adjacent volume loss and parenchymal opacity; spiculated nodule in right upper lobe up to 7 mm in diameter with some small regions of air filled cystic change; additional small spiculated nodular density in posterior aspect of right middle lobe abutting major fissure up to 10-11 mm in diameter unchanged; unchanged 4 mm nodule in left lower lobe; stable 3 mm focus of subpleural nodularity; incidental small calcified granuloma in left lower lobe; no new pulmonary nodule; redemonstrated severe emphysema; unchanged appearance of mildly enlarged mediastinal nodes largest subcarinal node 14 mm   She saw Dr Gilbert in 6/25 -rad onc has scheduled her next chest CT for 10/28/25 -will see her again in 6 month     GERD and emesis:  Recommend she consider adding omeprazole 20 mg daily      LE edema:    No longer taking furosemide since potassium wasted   Monitor daily sodium intake  Elevate legs for 45 minutes in the afternoons   Elevate legs as much as possible during the day      Situational depression:    Her son (Oliver) passed on 6/27/2023 from cirrhosis at age 42.       She admits she has depression about her sons passing.    She is happy when she gets to see her grandchildren  She declines medications for treatment      Insomnia:  She stopped trazodone.     Constipation:   Explained systemic medications that are contributing to her constipation   She is to avoid straining with bowel movement   Continue Mg 200 mg  daily.  She can add a stool softener when needed      Neurogenic claudication BLE/ PAD:   Continue cilostazol 50 mg daily   She failed gabapentin    Aortogram 7/2021 with lower extremity angiography showed critical proximal right iliac, renal artery stenosis of about 90% or more with heavy calcifications, completely occluded left SFA throughout its length, and a focal mid right SFA stenosis of about 90% had iliac artery stenting via the right femoral approach without complication   MRI of L/S 11/2021 did not show stenosis   EMG studies 11/2021 were normal   She restarted PT but on the 3 rd session she pulled a hamstring     She saw Dr Zack Haider  11/24 Will continue to manage medically      Spinal Stenosis: her back pain is chronic:    Seen in the ED on 3/21/2024 for T 7 compression fracture    Continue Fosamax 70 mg weekly and calcitonin NS daily.    Xray L/S 5/24 Advanced lumbar degenerative changes/scoliosis similar to previous   CT 5/2024 prominent compression fracture at T7 progressed since 3/21/2024 with mild paraspinal soft tissue swelling; there is also subtle interval erosion of anterior inferior aspect of the T6 vertebral body    She will continue to follow with Dr Almanzar and Dr Porras   She has done PT and chiropractor in the past and declines to do more   -s/p kyphoplasty with spine fermín T6- 7 on 7/24/2024 with Dr Bowman     She aw Dr Bowman in 9/24        Vitamin D def: Levels 40 on labs in 3/25   Continue scripted Vitamin D 50K UT weekly. Refilled 3/25      Mammo in 8/2021 was normal and no longer needs to repeat   She is getting routine CT scans of the chest with oncology  Breast exam normal 3/25     BD in 8/2023 T-3.1. Continue calcitonin NS to use daily. She admits she has not been using the calcitonin nasal spray. Discussed importance of compliance with nasal spray. Explained the proper way to use calcitonin nasal spray.    She is no longer taking Fosamax   Continue OTC Ca 600 mg BID,  scripted Vitamin D weekly and eat 2 servings of calcium enriched foods daily.   Discussed importance of weight bearing exercises.       Colonoscopy with Dr Jain in  was normal.  She knows a couple of people who  during colonoscopy and she is afraid to repeat it. She declines Cologuard because her sister had false positive results.  She declines colonoscopy and Cologuard 2021     Ophth:    Seen in 2024 for corneal abrasion left eye. She had cataract extraction in 2018. She is not wearing glasses except for night driving. No glaucoma or MD.     I spent 15 min with the patient discussing their wishes for end of life choices.   We discussed the need for a Living Will and that wishes should be discussed with Family. The DNR status was reviewed, and we discussed the options of this and, the DNR _CC options as well.   We also went over how important it was to have these choices written down and clear for any surviving family so that their wishes are followed   The patient and I came to to following agreement :   She has a LW and MPOA.  Copy of her Advanced Directives scanned in her chart 3/2023        Hep C  -  FLU , 10/24   TDAP ,  with injury   RSV discussed   Prevnar   Pneumo 2011  Shingrix recommend and declines   SARS CVD vaccine declines   Disability placard given 2023 for 5 years      Some elements in the chart were copied from Dr. Warren's last office visit with patient.   Notes have been updated where appropriate, and reflect my current medical decision making from today.       Keep scheduled appt in  for follow up or sooner if needed   (MCR due 3/26, last mcr 3/20/25)     I, Jessica Blackman am scribing for, and in the presence of Dr. Sunshine Warren MD.  I, Dr. Sunshine Warren MD, personally performed the services described in the documentation as scribed by Jessica Blackman in my presence, and confirm it is both accurate and complete.

## 2025-08-06 ENCOUNTER — APPOINTMENT (OUTPATIENT)
Dept: PRIMARY CARE | Facility: CLINIC | Age: 80
End: 2025-08-06
Payer: MEDICARE

## 2025-08-06 VITALS
HEIGHT: 63 IN | DIASTOLIC BLOOD PRESSURE: 70 MMHG | BODY MASS INDEX: 18.61 KG/M2 | WEIGHT: 105 LBS | SYSTOLIC BLOOD PRESSURE: 110 MMHG

## 2025-08-06 DIAGNOSIS — S22.060K: ICD-10-CM

## 2025-08-06 DIAGNOSIS — S32.039D CLOSED FRACTURE OF THIRD LUMBAR VERTEBRA WITH ROUTINE HEALING, UNSPECIFIED FRACTURE MORPHOLOGY, SUBSEQUENT ENCOUNTER: ICD-10-CM

## 2025-08-06 DIAGNOSIS — I42.0 DILATED CARDIOMYOPATHY (MULTI): ICD-10-CM

## 2025-08-06 DIAGNOSIS — S32.029D CLOSED FRACTURE OF SECOND LUMBAR VERTEBRA WITH ROUTINE HEALING, UNSPECIFIED FRACTURE MORPHOLOGY, SUBSEQUENT ENCOUNTER: Primary | ICD-10-CM

## 2025-08-06 DIAGNOSIS — I10 BENIGN ESSENTIAL HYPERTENSION: ICD-10-CM

## 2025-08-06 DIAGNOSIS — I67.1 ANEURYSM OF MIDDLE CEREBRAL ARTERY (HHS-HCC): ICD-10-CM

## 2025-08-06 DIAGNOSIS — C34.31 MALIGNANT NEOPLASM OF LOWER LOBE OF RIGHT LUNG (MULTI): ICD-10-CM

## 2025-08-06 DIAGNOSIS — I50.22 CHRONIC SYSTOLIC CONGESTIVE HEART FAILURE: ICD-10-CM

## 2025-08-06 PROBLEM — I50.23 ACUTE ON CHRONIC SYSTOLIC (CONGESTIVE) HEART FAILURE: Status: ACTIVE | Noted: 2023-04-08

## 2025-08-06 PROBLEM — I50.23 ACUTE ON CHRONIC SYSTOLIC (CONGESTIVE) HEART FAILURE: Status: RESOLVED | Noted: 2023-04-08 | Resolved: 2025-08-06

## 2025-08-06 PROCEDURE — 99214 OFFICE O/P EST MOD 30 MIN: CPT | Performed by: INTERNAL MEDICINE

## 2025-08-06 PROCEDURE — G2211 COMPLEX E/M VISIT ADD ON: HCPCS | Performed by: INTERNAL MEDICINE

## 2025-08-06 PROCEDURE — 3078F DIAST BP <80 MM HG: CPT | Performed by: INTERNAL MEDICINE

## 2025-08-06 PROCEDURE — 3074F SYST BP LT 130 MM HG: CPT | Performed by: INTERNAL MEDICINE

## 2025-08-06 PROCEDURE — 1160F RVW MEDS BY RX/DR IN RCRD: CPT | Performed by: INTERNAL MEDICINE

## 2025-08-06 PROCEDURE — 1159F MED LIST DOCD IN RCRD: CPT | Performed by: INTERNAL MEDICINE

## 2025-08-14 ENCOUNTER — HOSPITAL ENCOUNTER (OUTPATIENT)
Dept: CARDIOLOGY | Facility: CLINIC | Age: 80
Discharge: HOME | End: 2025-08-14
Payer: MEDICARE

## 2025-08-14 DIAGNOSIS — I47.20 PAROXYSMAL VENTRICULAR TACHYCARDIA: ICD-10-CM

## 2025-08-14 DIAGNOSIS — Z95.810 ICD (IMPLANTABLE CARDIOVERTER-DEFIBRILLATOR) IN PLACE: ICD-10-CM

## 2025-08-14 PROCEDURE — 93296 REM INTERROG EVL PM/IDS: CPT

## 2025-09-09 ENCOUNTER — APPOINTMENT (OUTPATIENT)
Dept: CARDIOLOGY | Facility: CLINIC | Age: 80
End: 2025-09-09
Payer: MEDICARE

## 2025-09-23 ENCOUNTER — APPOINTMENT (OUTPATIENT)
Dept: PRIMARY CARE | Facility: CLINIC | Age: 80
End: 2025-09-23
Payer: MEDICARE

## 2025-10-02 ENCOUNTER — APPOINTMENT (OUTPATIENT)
Dept: CARDIOLOGY | Facility: CLINIC | Age: 80
End: 2025-10-02
Payer: MEDICARE

## 2025-10-30 ENCOUNTER — APPOINTMENT (OUTPATIENT)
Dept: CARDIOLOGY | Facility: CLINIC | Age: 80
End: 2025-10-30
Payer: MEDICARE